# Patient Record
Sex: FEMALE | Race: BLACK OR AFRICAN AMERICAN | NOT HISPANIC OR LATINO | Employment: FULL TIME | URBAN - METROPOLITAN AREA
[De-identification: names, ages, dates, MRNs, and addresses within clinical notes are randomized per-mention and may not be internally consistent; named-entity substitution may affect disease eponyms.]

---

## 2017-12-07 LAB — POC BETA-HCG (QUAL): POSITIVE

## 2018-01-03 ENCOUNTER — HISTORICAL (OUTPATIENT)
Dept: ADMINISTRATIVE | Facility: HOSPITAL | Age: 28
End: 2018-01-03

## 2018-01-03 LAB
ABS NEUT (OLG): 6.48 X10(3)/MCL (ref 2.1–9.2)
BASOPHILS # BLD AUTO: 0.02 X10(3)/MCL
BASOPHILS NFR BLD AUTO: 0 % (ref 0–1)
BILIRUB SERPL-MCNC: NEGATIVE MG/DL
BLOOD URINE, POC: NEGATIVE
BUN SERPL-MCNC: 10 MG/DL (ref 7–18)
CALCIUM SERPL-MCNC: 8.9 MG/DL (ref 8.5–10.1)
CHLORIDE SERPL-SCNC: 104 MMOL/L (ref 98–107)
CLARITY, POC UA: CLEAR
CO2 SERPL-SCNC: 28 MMOL/L (ref 21–32)
COLOR, POC UA: YELLOW
CREAT SERPL-MCNC: 0.8 MG/DL (ref 0.6–1.3)
EOSINOPHIL # BLD AUTO: 0.01 10*3/UL
EOSINOPHIL NFR BLD AUTO: 0 % (ref 0–5)
ERYTHROCYTE [DISTWIDTH] IN BLOOD BY AUTOMATED COUNT: 11.2 % (ref 11.5–14.5)
GLUCOSE SERPL-MCNC: 80 MG/DL (ref 74–106)
GLUCOSE UR QL STRIP: NEGATIVE
HBV SURFACE AG SERPL QL IA: NEGATIVE
HCT VFR BLD AUTO: 41.5 % (ref 35–46)
HCV AB SERPL QL IA: NONREACTIVE
HGB BLD-MCNC: 13.5 GM/DL (ref 12–16)
HIV 1+2 AB+HIV1 P24 AG SERPL QL IA: NONREACTIVE
IMM GRANULOCYTES # BLD AUTO: 0.02 10*3/UL
IMM GRANULOCYTES NFR BLD AUTO: 0 %
KETONES UR QL STRIP: NORMAL
LEUKOCYTE EST, POC UA: NEGATIVE
LYMPHOCYTES # BLD AUTO: 1.63 X10(3)/MCL
LYMPHOCYTES NFR BLD AUTO: 19 % (ref 15–40)
MCH RBC QN AUTO: 29.5 PG (ref 26–34)
MCHC RBC AUTO-ENTMCNC: 32.5 GM/DL (ref 31–37)
MCV RBC AUTO: 90.6 FL (ref 80–100)
MONOCYTES # BLD AUTO: 0.39 X10(3)/MCL
MONOCYTES NFR BLD AUTO: 5 % (ref 4–12)
NEUTROPHILS # BLD AUTO: 6.48 X10(3)/MCL
NEUTROPHILS NFR BLD AUTO: 76 X10(3)/MCL
NITRITE, POC UA: NEGATIVE
PAP RECOMMENDATION EXT: NORMAL
PAP SMEAR: NORMAL
PH, POC UA: 6.5
PLATELET # BLD AUTO: 166 X10(3)/MCL (ref 130–400)
PMV BLD AUTO: 11.4 FL (ref 7.4–10.4)
POTASSIUM SERPL-SCNC: 3.8 MMOL/L (ref 3.5–5.1)
PROTEIN, POC: NORMAL
RBC # BLD AUTO: 4.58 X10(6)/MCL (ref 4–5.2)
RPR SER QL: REACTIVE
SODIUM SERPL-SCNC: 139 MMOL/L (ref 136–145)
SPECIFIC GRAVITY, POC UA: 1.02
T PALLIDUM AB SER QL: REACTIVE
UROBILINOGEN, POC UA: NORMAL
WBC # SPEC AUTO: 8.6 X10(3)/MCL (ref 4.5–11)

## 2018-01-05 LAB — FINAL CULTURE: NORMAL

## 2018-01-08 LAB
BILIRUB SERPL-MCNC: NEGATIVE MG/DL
BLOOD URINE, POC: NEGATIVE
CLARITY, POC UA: NORMAL
COLOR, POC UA: YELLOW
GLUCOSE UR QL STRIP: NEGATIVE
KETONES UR QL STRIP: NEGATIVE
LEUKOCYTE EST, POC UA: NEGATIVE
NITRITE, POC UA: NEGATIVE
PH, POC UA: 7
PROTEIN, POC: NORMAL
SPECIFIC GRAVITY, POC UA: 1.01
UROBILINOGEN, POC UA: NORMAL

## 2018-01-15 LAB
BILIRUB SERPL-MCNC: NEGATIVE MG/DL
BLOOD URINE, POC: NEGATIVE
CLARITY, POC UA: CLEAR
COLOR, POC UA: YELLOW
GLUCOSE UR QL STRIP: NEGATIVE
KETONES UR QL STRIP: NEGATIVE
LEUKOCYTE EST, POC UA: NEGATIVE
NITRITE, POC UA: NEGATIVE
PH, POC UA: 7
PROTEIN, POC: NORMAL
SPECIFIC GRAVITY, POC UA: 1.01
UROBILINOGEN, POC UA: NORMAL

## 2018-01-29 ENCOUNTER — HISTORICAL (OUTPATIENT)
Dept: FAMILY MEDICINE | Facility: CLINIC | Age: 28
End: 2018-01-29

## 2018-02-01 ENCOUNTER — HISTORICAL (OUTPATIENT)
Dept: RADIOLOGY | Facility: HOSPITAL | Age: 28
End: 2018-02-01

## 2018-03-19 ENCOUNTER — HISTORICAL (OUTPATIENT)
Dept: ADMINISTRATIVE | Facility: HOSPITAL | Age: 28
End: 2018-03-19

## 2018-03-21 LAB — FINAL CULTURE: NORMAL

## 2021-10-01 ENCOUNTER — HISTORICAL (OUTPATIENT)
Dept: ADMINISTRATIVE | Facility: HOSPITAL | Age: 31
End: 2021-10-01

## 2021-10-01 LAB
IRON SATN MFR SERPL: 27 % (ref 20–50)
IRON SERPL-MCNC: 77 UG/DL (ref 50–170)
TIBC SERPL-MCNC: 213 UG/DL (ref 70–310)
TIBC SERPL-MCNC: 290 UG/DL (ref 250–450)
TRANSFERRIN SERPL-MCNC: 260 MG/DL (ref 180–382)
TSH SERPL-ACNC: 1.6 UIU/ML (ref 0.35–4.94)

## 2021-10-12 ENCOUNTER — HISTORICAL (OUTPATIENT)
Dept: ADMINISTRATIVE | Facility: HOSPITAL | Age: 31
End: 2021-10-12

## 2021-10-12 LAB
BUN SERPL-MCNC: 13.4 MG/DL (ref 7–18.7)
CALCIUM SERPL-MCNC: 9.9 MG/DL (ref 8.4–10.2)
CHLORIDE SERPL-SCNC: 107 MMOL/L (ref 98–107)
CO2 SERPL-SCNC: 26 MMOL/L (ref 22–29)
CREAT SERPL-MCNC: 0.77 MG/DL (ref 0.55–1.02)
CREAT/UREA NIT SERPL: 17
GLUCOSE SERPL-MCNC: 82 MG/DL (ref 74–100)
POTASSIUM SERPL-SCNC: 4 MMOL/L (ref 3.5–5.1)
RET# (OHS): 0.04 X10(6)/MCL (ref 0.02–0.08)
RETICULOCYTE COUNT AUTOMATED (OLG): 1 % (ref 0.5–1.5)
SODIUM SERPL-SCNC: 140 MMOL/L (ref 136–145)

## 2021-11-05 ENCOUNTER — HISTORICAL (OUTPATIENT)
Dept: RADIOLOGY | Facility: HOSPITAL | Age: 31
End: 2021-11-05

## 2022-04-09 ENCOUNTER — HISTORICAL (OUTPATIENT)
Dept: ADMINISTRATIVE | Facility: HOSPITAL | Age: 32
End: 2022-04-09
Payer: MEDICAID

## 2022-04-25 VITALS
WEIGHT: 190.69 LBS | HEIGHT: 66 IN | OXYGEN SATURATION: 100 % | SYSTOLIC BLOOD PRESSURE: 191 MMHG | BODY MASS INDEX: 30.65 KG/M2 | DIASTOLIC BLOOD PRESSURE: 136 MMHG

## 2022-04-30 NOTE — PROGRESS NOTES
Patient:   Savanah Pang             MRN: [id number]            FIN: [id number]               Age:   27 years     Sex:  Female     :  1990   Associated Diagnoses:   None   Author:   Kory WINSTON, Brenna Live      I have reviewed the positive STI test result 1/3/18 for Syph Ab + and RPR + with 2 dils  I have asked our nursing staff to call the [facility] dept to ask for any available treatment records.    I have asked our nursing staff to change OB followup to next appointment within one week (in stead of 4 weeks) to  discuss results with patient and make treatment plan.    18 Peyton WINSTON

## 2022-04-30 NOTE — PROGRESS NOTES
Patient:   Savanah Pang             MRN: [id number]            FIN: [id number]               Age:   27 years     Sex:  Female     :  1990   Associated Diagnoses:   IUP (intrauterine pregnancy), incidental   Author:   Brenna Horn MD      Chief Complaint   Pregnant      History of Present Illness     28 yo  @ 8wks1d GA dated by 8wd U/S only (EDC = 18)  New Issues:  none    Chronic Issues: previous       Histories   Gestational History:   - G1: 10/2009 term 40 wk 6.5lb female by  , epidural, [facility], bottle fed,  COMPLICATIONS: cHTN (did not need meds)   - G2: 10/2010 term 30 week FORCEPS 7lb male assistend vag delivery (subjectively for failure to descend), epidural [facility], bottle fed,  COMPLICATIONS: cHTN (did not need meds)   - G3: 3/2014 term 38 week 6.5 lb female , NO epidural, [facility], bottle fed,  COMPLICATIONS: cHTN (did not need meds)   - G4: CURRENT  Gyn History:    - LMP: 10/26/2018   - Age at menarche: 13 years   - Menstrual hx: regula, 30day cycles,   - History of birth control:     - History of STDs : denies cutaneous genital lesions and herpes, EVER   - DENIES Abnormal PAPs or previous COLPO    Past Medical History: Rh NEG  Surgical History: denies  Family History: non contributory  Social History:  denies: EtOH/tob/drugs  Medications: none since LMP      Past Medical History:    Resolved  Pregnant ([id number]): Onset on 2013 at 23 years.  Resolved on 3/30/2014 at 23 years.  Pregnant ([id number]): Onset on 2010 at 19 years.  Resolved on 10/20/2010 at 20 years.  Pregnant ([id number]): Onset on 2009 at 18 years.  Resolved on 10/28/2009 at 19 years.  No Chronic Problems (NKP):  Resolved.   Family History:    No family history items have been selected or recorded.   Procedure history:    denies.   Social History        Social & Psychosocial Habits    Alcohol  2014 Risk Assessment: Denies Alcohol Use    2017  Use: Current     Type: Beer, Liquor, Wine    Frequency: Daily    Employment/School  01/03/2018  Status: Employed    Substance Abuse  03/30/2014 Risk Assessment: Denies Substance Abuse    09/20/2017  Use: Never    Tobacco  03/30/2014 Risk Assessment: Medium Risk    09/20/2017  Use: Current every day smoker    Type: Cigarettes    Tobacco use per day: 1  .        Health Status   Allergies:    Allergic Reactions (All)  No Known Medication Allergies,    Allergies (1) Active Reaction  No Known Medication Allergies None Documented     Current medications:  (Selected)   Outpatient Medications  Ordered  RhoGAM: 150 mg, form: Injection, IM, Once, first dose 11/12/13 14:44:00 CST, stop date 11/12/13 14:44:00 CST, STAT  RhoGAM: 300 mcg, form: Injection, IM, Once, first dose 11/12/13 14:32:00 CST, stop date 11/12/13 14:32:00 CST, STAT  influenza virus vaccine, inactivated preserve-free pediatric quadrivalent intramuscular suspension...: 0.5 mL, form: Susp, IM, Once, first dose 01/03/18 10:00:00 CST, stop date 01/03/18 10:00:00 CST  Prescriptions  Prescribed  PNV Tabs 29-1 oral tablet: 1 tab(s), Oral, Daily, PHARMACY MAY DISPENSE ANY SIMILAR ALTERNATIVE PRENATAL VITAMIN AS AVAILABLE/COVERED, # 90 tab(s), 2 Refill(s), Pharmacy: [id number],    Home Medications (1) Active  PNV Tabs 29-1 oral tablet 1 tab(s), Oral, Daily     Problem list:    All Problems  Active labor / ICD-9- / Confirmed  Caffeine user / SNOMED CT 4943001797 / Confirmed  Problem added by Discern Expert  Chronic hypertension in obstetric context / SNOMED CT 50375256 / Confirmed  Problem added by Discern Expert  Encounter to determine fetal viability of pregnancy / SNOMED CT 96233890 / Confirmed  Pregnant / SNOMED CT 508799176 / Probable  Tobacco user / SNOMED CT 095285278 / Probable  Resolved: At risk for infection / SNOMED CT 556608982  Problem automatically updated based on documentation on WBC and/or Neutro Auto.  Resolved: At risk of pressure sore / SNOMED CT  132573179  Resolved: Impaired mobility / SNOMED CT 611471385  Problem automatically updated based on documentation on Assistive Device, ADLs, Activity Status ADLs, Musculoskeletal Range of Motion, Musculoskeletal Activity, Special Orthopedic Devices, and/or Traction Type.  Resolved: No Chronic Problems / Cerner NKP  Resolved: Pregnant / SNOMED CT 950809913  Resolved: Pregnant / SNOMED CT 013506267  Resolved: Pregnant / SNOMED CT 853751037,    Active Problems (6)  Active labor   Caffeine user   Chronic hypertension in obstetric context   Encounter to determine fetal viability of pregnancy   Pregnant   Tobacco user         Review of Systems     Antepartum specific     denies: dysuira/ abnormal discharge /vag bleeding/ pelvic or abd pain /   reports: nause and vomiting , mild HA intermittently      Constitutional:  No fever, No chills.    Ear/Nose/Mouth/Throat:  Negative except as documented in history of present illness.    Respiratory:  No shortness of breath, No cough, No wheezing.    Cardiovascular:  No chest pain, No palpitations.    Gastrointestinal:  No nausea, No vomiting.    Genitourinary:  No dysuria, No lesions.    Gynecologic:  Negative except as documented in history of present illness.    Musculoskeletal:  No back pain, No joint pain, No muscle pain.    Integumentary:  No rash, No skin lesion.    Psychiatric:  No anxiety, No depression, Not suicidal.       Physical Examination   Vital Signs   1/3/2018 8:51 CST        Temperature Oral          36.9 DegC                             Temperature Oral (calculated)             98.42 DegF                             Peripheral Pulse Rate     68 bpm                             Respiratory Rate          18 br/min                             SpO2                      100 %                             Systolic Blood Pressure   159 mmHg  HI                             Diastolic Blood Pressure  100 mmHg  HI     Measurements from flowsheet : Measurements   1/3/2018  8:51 CST        Weight Dosing             75.4 kg                             Weight Measured           75.4 kg                             Weight Measured and Calculated in Lbs     166.23 lb                             Height/Length Dosing      167 cm                             Height/Length Measured    167 cm                             BSA Measured              1.87 m2                             Body Mass Index Measured  27.04 kg/m2     General:  Alert and oriented, No acute distress.    Eye:  Extraocular movements are intact, Normal conjunctiva.    HENT:  Normocephalic, Oral mucosa is moist.    Neck:  Supple, No thyromegaly.    Respiratory:  Lungs are clear to auscultation, Respirations are non-labored, Breath sounds are equal.    Cardiovascular:  Normal rate, Regular rhythm, Good pulses equal in all extremities, Normal peripheral perfusion, No edema.    Gastrointestinal:  Soft, Non-tender, Normal bowel sounds, gravid, gravid.    Genitourinary:          Labia: no lesions.         Cervix: Mucosa ( No inflammation ), no blood per os, No lesions.    Obstetric Exam     Uterus: fundal height, consistent with gestational age.     Lainez/ Baby A fetal evaluation: fetal movement, heart tones, assessment of fetal heart tracing.     Perineum.     Vulva.     Vagina: discharge (small amount, white), no lesions.     Cervix.     No contractions noted.     Musculoskeletal:  Normal strength, Normal gait.    Integumentary:  Warm, Dry, No rash.    Neurologic:  Alert, Oriented, No focal deficits, Normal deep tendon reflexes.    Cognition and Speech:  Oriented, Speech clear and coherent.    Psychiatric:  Cooperative, Appropriate mood & affect, Normal judgment, Non-suicidal.       Health Maintenance      Health Maintenance     Pending (in the next year)        Due            Alcohol Misuse Screening due  01/03/18  and every 1  year(s)           Cervical Cancer Screening due  01/03/18  Variable frequency           Depression  Screening due  01/03/18  and every 1  year(s)           Hypertension Maintenance-Medication Prescribed due  01/03/18  and every 1  year(s)           Hypertension Management-Education due  01/03/18  and every 1  year(s)           Smoking Cessation due  01/03/18  and every 1  year(s)        Due In Future            Hypertension Management-Blood Pressure not due until  07/03/18  and every 6  month(s)           Tobacco Use Screening not due until  09/20/18  and every 1  year(s)           Influenza Vaccine not due until  10/02/18  and every 1  year(s)     Satisfied (in the past 1 year)        Satisfied            Blood Pressure Screening on  01/03/18.  Satisfied by Tracy Alvarado RN.           Body Mass Index Check on  01/03/18.  Satisfied by Tracy Alvarado RN.           Hypertension Management-Blood Pressure on  01/03/18.  Satisfied by Tracy Alvarado RN.           Influenza Vaccine on  01/03/18.  Satisfied by Brenna Horn MD           Obesity Screening on  01/03/18.  Satisfied by Tracy Alvarado RN.           Tobacco Use Screening on  09/20/17.  Satisfied by Iglesia Simmons          Review / Management   Results review:  Lab results   1/3/2018 9:09 CST        Urine Color Urine Dipstick                Yellow                             Urine Appearance Urine Dipstick           Clear                             pH Urine Dipstick         6.5                             Specific Gravity Urine Dipstick           1.020                             Blood Urine Dipstick      Negative                             Glucose Urine Dipstick    Negative                             Ketones Urine Dipstick    Trace                             Protein Urine Dipstick    Trace                             Bilirubin Urine Dipstick  Negative                             Urobilinogen Urine Dipstick               0.2 mg/dl                             Leukocytes Urine Dipstick Negative                             Nitrite Urine  Dipstick    Negative  .    Laboratory Results     Initial OB Labs:   - Blood Type and Rh: _   - Antibody Screen: _   - CBC H/H: _   - HIV: _   - RPR: _   - GC: _   - CT: _   - HBsAg: _   - HCVAb: _   - Rubella: _   - Varicella: _   - UA & Culture: _   - Sickle Cell Screen: _   - PAP: _   - Influenza vaccine date: _    15-20 Weeks Lab    - Quad Screen: _    28 Week Lab    - 1H GTT: _    - Rhogam: _    - Date of Tdap: _    - CBC H/H: _    - RPR: _    36 Week Lab    - CBC H/H: _    - RPR: _    - GBS Culture: _    - HIV: _    - Urine: GC: _      Radiology results      Ultrasound(s):        - Initial U/S:        - 20wk Anatomy scan:      Documentation reviewed:          -POC Urine Dip: reviewed at time of interview .       Impression and Plan   Diagnosis     IUP (intrauterine pregnancy), incidental (POT31-PC Z33.1).       28 yo  @ 8wks1d GA dated by 8wd U/S only (EDC = 18)       1. Intrauterine Pregnancy    - OB Protocol     - PNVs perscribed 1/3/18    - 1/3/18 Urine dip: neg nitr/ neg LE    - 1/3/18 fri tri (initial) labs: ordered/pending    -  plans on breast  feeds    - Postpartum contraception discussion:  considering MirenaLARC    - flu vacc ORDERED 1/3/18    2)essential HTN complicating pregnancy  - subjective hx of cHTN (before 20 weeks in G1-3 prior pregnancies)   - subjectively denies and history of Pre-E  - NEEDs ASA 81 mg daily initiated between 12-28 weeks  -single first non severely hypertensive in clinic (1/3/18) 159/100  - plan to start antiHTN meds if severe range 160/105  - Needs  24 hour baseline total urine protein,  if 1/3/18 UCx   negative     3) Rh NEG status  - STRICT ED precautions for vaginal bleeding  - counselled at length the need to present promptly for medical evaluation of vaginal bleeding  - NEEDs 28 week Rhogam    educated this visit: safe meds in pregnancy, PNVit compliance, complications of first trimester  strict ED precautions given for :ectopic/ SAB / VB/ pyelo /   dehydration / unable to tolerate po/ pelvic or abdominal pain    DISPO: anticipate   RTC in 4wks : [facility] for cHTN  -f/u 1/3/18 LABS ordered/PENDING  - NEEDS: EKG, BP log and cuff; 24 hour baseline total urine protein if negative 1/3/18 UCx  - start ASA 81 mg daily

## 2022-04-30 NOTE — PROGRESS NOTES
Patient:   Savanah Pang             MRN: [id number]            FIN: [id number]               Age:   27 years     Sex:  Female     :  1990   Associated Diagnoses:   None   Author:   Kwasi Cordova MD      Physician: MAGALY  Reason for Exam:INITIAL PRENATAL VISIT; DATING ULTRASOUND  :4  Parity:3  LMP:10/26/2017  Gestational Age: 9w6d  EDC:     Examination:  CODEY: ADEQUATE  Fetal Tone: PRESENT  Fetal Movement:   Fetal Heart Rate: 178  Fetus: SINGLE  Presentation:   Placenta:       Position: WRAP      Grade:     Measurement:   CRL: 1.73cm  EGA: 8w1d  EDC: 2018    Comments: 1st trimester intrauterine pregnancy measuring 8w1d for EDC of 2018 which differs from menstrual date of 2018. FHR: 178. Viable pregnancy.  ASSIGN EDC: 2018.          This document has images

## 2022-04-30 NOTE — PROGRESS NOTES
Patient:   Savanah Pang             MRN: [id number]            FIN: [id number]               Age:   27 years     Sex:  Female     :  1990   Associated Diagnoses:   IUP (intrauterine pregnancy), incidental   Author:   Yomaira Moser MD      History of Present Illness   28 yo Rh NEG  @ 18^6 wks6d GA dated by 8wd U/S only (EDC = 18) presents to [facility]    New Issues:  none; denies any vag bleeding or discharge, headache, change, edema, or gush of fluid.    Chronic Issues:  Previous history of syphilis: Treated at [facility] in ,  and 2018  Hypertension: Currently on labetalol 100 twice a day and 81 mg ASA, initiated on HCTZ 25 mg daily on 3/19/2018  Previous history of preeclampsia in the past 3 pregnancies  Rh negative: Patient needs RhoGAM at 28 weeks         Health Status   Allergies:    Allergies (1) Active Reaction  No Known Medication Allergies None Documented     Current medications:    Home Medications (4) Active  aspirin 81 mg oral Delayed Release (EC) tablet 81 mg = 1 tab(s), Oral, Daily  hydrochlorothiazide 25 mg oral tablet 25 mg = 1 tab(s), Oral, Daily  labetalol 100 mg oral tablet 100 mg = 1 tab(s), Oral, BID  PNV Tabs 29-1 oral tablet 1 tab(s), Oral, Daily        Physical Examination   Vital Signs   3/19/2018 13:18 CDT      Peripheral Pulse Rate     66 bpm                             Systolic Blood Pressure   150 mmHg  HI                             Diastolic Blood Pressure  108 mmHg  HI                             Mean Arterial Pressure, Cuff              122 mmHg    3/19/2018 13:15 CDT      Temperature Oral          36.8 DegC                             Temperature Oral (calculated)             98.24 DegF                             Peripheral Pulse Rate     67 bpm                             Respiratory Rate          20 br/min                             SpO2                      96 %                             Saturation Probe Site     Hand, left                              Systolic Blood Pressure   140 mmHg                             Diastolic Blood Pressure  92 mmHg  HI                             Mean Arterial Pressure, Cuff              108 mmHg                             Blood Pressure Location   Right arm                             Blood Pressure Cuff Size  Adult large     Measurements from flowsheet : Measurements   3/19/2018 13:15 CDT      Weight Measured           79.4 kg                             Weighing Method           Standing                             Height/Length Dosing      167 cm                             Height/Length Measured    167 cm                             BSA Measured              1.92 m2                             Body Mass Index Measured  28.47 kg/m2                             Ideal Body Weight Calculated              58.074911 kg     General:  Alert and oriented, No acute distress.    Eye:  Extraocular movements are intact, Normal conjunctiva.    HENT:  Normocephalic, Oral mucosa is moist.    Neck:  Supple, No thyromegaly.    Respiratory:  Lungs are clear to auscultation, Respirations are non-labored, Breath sounds are equal.    Cardiovascular:  Normal rate, Regular rhythm, Good pulses equal in all extremities, Normal peripheral perfusion, No edema.    Gastrointestinal:  Soft, Non-tender, Normal bowel sounds, gravid, gravid.    Obstetric Exam     Uterus: consistent with gestational age, FH <19.     Lainez/ Baby A fetal evaluation: fetal movement, heart tones 144 bpm.     Musculoskeletal:  Normal strength, Normal gait.    Integumentary:  Warm, Dry, No rash.    Neurologic:  Alert, Oriented, No focal deficits, Normal deep tendon reflexes.    Cognition and Speech:  Oriented, Speech clear and coherent.    Psychiatric:  Cooperative, Appropriate mood & affect, Normal judgment, Non-suicidal.       Review / Management   Results review   Laboratory Results     Initial OB Labs(1/3/18) :   - Blood Type and Rh:  O Rh neg   - Antibody  Screen: neg   - CBC H/H: _   - HIV: neg   -Syph AB  positive RPR: positive 2 Dils    - GC: neg   - CT: neg   - HBsAg: neg   - HCVAb: neg   - Rubella: Imm   - Varicella: Imm   - UCulture:  NG x 48hr   - Sickle Cell Screen: neg   - 1/3/18 PAP: satis PAP: NILM     - Influenza vaccine date: 1/3/18    15-20 Weeks Lab    - Quad Screen: Negative on 3/12/18    28 Week Lab    - 1H GTT: _    - Rhogam: _    - Date of Tdap: _    - CBC H/H: _    - RPR: _    36 Week Lab    - CBC H/H: _    - RPR: _    - GBS Culture: _    - HIV: _    - Urine: GC: _      Radiology results   Comments: 1st trimester intrauterine pregnancy measuring 8w1d for EDC of 08/14/2018 which differs from menstrual date of 08/02/2018. FHR: 178. Viable pregnancy.  ASSIGN EDC: 08/14/2018.     Documentation reviewed:          -POC Urine Dip: reviewed at time of interview .       Impression and Plan   Diagnosis     IUP (intrauterine pregnancy), incidental (GLS24-LX Z33.1).       1. Intrauterine Pregnancy:     - OB Protocol     - PNVs perscribed 1/3/18    -  plans on breast  feeds    - Postpartum contraception discussion:  considering Mirena as LARC    - Influenza vaccine date: 1/3/18    2) essential HTN complicating pregnancy  - diagnosed by reproducibly elevated  BPS @ rest <20wk; including severe BPs   -single first non severely hypertensive in clinic (1/3/18) 159/100   - second non severly elevated   clinic HTN (1/15/18) /104   -BP on 3/19/2018: Manual measurement of 140/93  - OFF  aldomet  (prev 11-16wk)   - CHANGED to labetolol 100 mg BID (2/5/17)  -Added HCTZ 25 mg on 3/19/2018  - subjective hx of cHTN (before 20 weeks in G1-3 prior pregnancies)   - subjectively denies and history of Pre-E in prev pregnancies  -ASA 81 mg daily  perscribed 1/15/18 ;    -pt is aware of recommendation to start daily 81 mg ASA with excellent compliance @ 12 weeks (after Jan 31 2018)  - 1/15/18 EKG: sinus rhythm, rate 72, intervals normal, No specific ST changes; likely LVH  "  -reveiwed 18 ECHO  - 1/15/18 24 hour baseline total urine protein= 117mg (WNL)  - NEEDS  NSTs @ 32 weeks then twice a week @ 36 weeks  - POOR compliance with home BP log    3) Rh NEG status  -seen in ED 3/1/18 for "vaginal bleeding"; though complaint hx per pt more consistent with hematuria in setting of lab confirmed UTI   - NO rhogam given in 3/1/18 ED visit  - STRICT ED precautions for vaginal bleeding   - counselled at length the need to present promptly for medical evaluation of vaginal bleeding  - NEEDs 28 week Rhogam  - consider RBC Ab screening with 24-28 wks    4) Syphilis Ab testing abnormal  - 1/3/18 Syphilis Antibody POS, 2 dils without history of prior treatment  - treated with 2.4MU PCN x 3   - s/p 18 2.4MU PCN Administered   - s/p 1/15/18 2.4MU PCN Administered   - s/p 18 2.4MU PCN Administered    strict ED precautions given for signs and symptoms of: dehydration, vag bleeding, SAB,  Labor, pyelonephritis, LOF     DISPO:   RTC in 3wks for fetal anatomy  - BP check in the next visit.  Check for symptoms of preeclampsia at next visit  -Reassess medication compliance at next visit              "

## 2022-05-02 NOTE — HISTORICAL [facility] C[facility]ER
This is a historical note converted from Mani. Formatting and pictures may have been removed.  Please reference Mani for original formatting and attached multimedia. Chief Complaint  FOLLOW UP WITH HTN  History of Present Illness  31-year-old female presents to clinic for follow up.  ?   Patient initially seen 10/1/2021.  ?  ?   Severe Uncontrolled Hypertension  Patient reports history of?eclampsia with severe features? last pregnancy in 2018.  Was continued on blood pressure medication after the pregnancy?but stated that she eventually stopped taking the medication.  Had been feeling fine until approximately 1 month ago when she started to take her blood pressure again due to daily headaches and noticed that?her blood pressure was rising?upper 100s to 200s over?160s.  9/30/2021 patient?had some numbness in her arm which prompted an ER visit?patient noted to have blood pressure 236/131?patient was given amlodipine prescription in the ED which she did not .  At last visit patient was prescribed amlodipine 10?to start immediately and lisinopril 20 mg to start 3 days after amlodipine. patient has been complaint with taking BP meds and states headaches and other body aches have resolved.  Does have renal artery Doppler scheduled 10/18/2021  Does note swelling at times in her ankles  Denies shortness of breath, chest pain  ?   Anemia  Patient with H&H of 11.9/36.8?was previously also low  Denies bleeding?in urine and UA in?ED does not show red blood cells  Denies bleeding gums or bleeding?in her feces  Has an IUD in place and states that her periods are very light  Iron panel performed last visit and iron noted to be within normal limits  ?  ?  ?   HM  Flu vaccine - declines  Review of Systems  Constitutional: no fever, no chills, no weight loss  CV: , no chest pain, no palpitations  ENT: no cough, no nasal congestion  : no urinary retention, no urinary incontinence  GI: , no diarrhea, no nausea, no  vomiting  Resp: no SOB, no wheezing, no difficulty breathing  Skin: no rash, no wounds, no discolorations  Neuro: no numbness/tingling, no weakness,? no syncope  Psych: no depression, no anxiety  Physical Exam  Vitals & Measurements  T:?36.9? ?C (Oral)? HR:?71(Peripheral)? RR:?18? BP:?136/71? SpO2:?99%?  HT:?168.00?cm? WT:?85.500?kg? BMI:?30.29?  General-well appearing, NAD, wearing mask  Eye_- PERRL, EOMI, no scleral icterus  ENMT- deferred  Respiratory- CTAB, no rhonchi or stridor noted  Cardiovascular- RRR, no murmurs auscultated.  Genitourinary- deferred  Musculoskeletal-+AROM, slight edema to ankles  Integumentary- warm, dry ,intact  Neurologic- cooperative  Assessment/Plan  1.?Anemia?D64.9  ?Reticulocyte count and hemoglobin fractionated with reflex ordered  Denies personal or family history of sickle cell trait  Ordered:  Hgb Frac w/Reflx to Solubility-[facility] [id number], Now collect, 10/12/21 14:45:00 CDT, Blood, Order for future visit, Stop date 10/12/21 14:45:00 CDT, Lab Collect, Chronic anemia, 10/12/21 14:45:00 CDT  Reticulocyte Count, Routine collect, 10/12/21 14:45:00 CDT, Blood, Order for future visit, Stop date 10/12/21 14:45:00 CDT, Lab Collect, Chronic anemia, 10/12/21 14:45:00 CDT  ?  Hypertension?I10  ?Continue lisinopril 20 mg and amlodipine 10 mg  Tone blood pressure cuff with office blood pressure monitor?must subtract 20 from patients blood pressure cuff?off of just diastolic? only.  repeating BMP today to check resolution of hyperkalemia  ?  ?  Ordered:  Basic Metabolic Panel, Routine collect, *Est. 10/12/21 3:00:00 CDT, Blood, Order for future visit, *Est. Stop date 10/12/21 3:00:00 CDT, Lab Collect, Hypertension, 10/12/21 14:44:00 CDT  Clinic Follow up, *Est. 12/12/21 3:00:00 CST, Order for future visit, Hypertension, [facility]  Office/Outpatient Visit Level 4 Established [id number] PC, Hypertension, [facility], 10/12/21 14:44:00 CDT  ?  Orders:  amLODIPine, 10 mg = 1  tab(s), Oral, Daily, # 30 tab(s), 3 Refill(s), Pharmacy: [id number], 168, cm, Height/Length Dosing, 10/12/21 14:34:00 CDT, 85.5, kg, Weight Dosing, 10/12/21 14:34:00 CDT  lisinopril, 20 mg = 1 tab(s), Oral, Daily, # 30 tab(s), 3 Refill(s), Pharmacy: [id number], 168, cm, Height/Length Dosing, 10/12/21 14:34:00 CDT, 85.5, kg, Weight Dosing, 10/12/21 14:34:00 CDT  RTC 2 months  Referrals  Clinic Follow up, *Est. 12/12/21 3:00:00 CST, Order for future visit, Hypertension, [facility]   Medications  amlodipine 10 mg oral tablet, 10 mg= 1 tab(s), Oral, Daily, 3 refills  lisinopril 20 mg oral tablet, 20 mg= 1 tab(s), Oral, Daily, 3 refills  RhoGAM, 300 mcg= 1 mL, IM, Once  RhoGAM, 150 mg= 500 mL, IM, Once  Allergies  No Known Medication Allergies  Family History  Family history is negative  Immunizations  Vaccine Date Status   tetanus/diphtheria/pertussis, acel(Tdap) 05/21/2018 Given   influenza virus vaccine, inactivated 01/03/2018 Given   tetanus-diphtheria toxoids 08/31/2015 Given   tetanus/diphtheria/pertussis, acel(Tdap) 03/31/2014 Given       Addendum correct typo of hyperkalemia to hypokalemia.  ?  Lamar Thorpe MD?  ?

## 2022-05-02 NOTE — HISTORICAL [facility] CERNER
This is a historical note converted from Mani. Formatting and pictures may have been removed.  Please reference Mani for original formatting and attached multimedia. Chief Complaint  establish care  History of Present Illness  31-year-old female presents to clinic to establish PCP.  ?  ?  Severe Uncontrolled Hypertension  Patient reports history of?eclampsia with severe features? last pregnancy in 2018.  Was continued on blood pressure medication after the pregnancy?but stated that she eventually stopped taking the medication.  Had been feeling fine until approximately 1 month ago when she started to take her blood pressure again due to daily headaches and noticed that?her blood pressure was rising?upper 100s to 200s over?160s.  9/30/2021 patient?had some numbness in her arm which prompted an ER visit?patient noted to have blood pressure 236/131?patient was given amlodipine prescription in the ED which she did not .  Of note?her potassium was noted to be 3.1?and she was given potassium supplementation before leaving the hospital.  Patient reports that she will ?her?amlodipine?10 mg today. ?She does have blood pressure monitor at home?and is amenable to checking her blood pressure daily  She denies history of Conn syndrome?or eating a lot of licorice  Does note swelling at times in her ankles  Denies shortness of breath, chest pain  ?  Anemia  Patient with H&H of 11.9/36.8?was previously also low  Denies bleeding?in urine and UA in?ED yesterday does not show red blood cells  Denies bleeding gums or bleeding?in her feces  Has an IUD in place and states that her periods are very light  ?  ?  Medical history-as above  Surgical history-denies  Family history-mother and grandmother with hypertension  Social history?former smoker-, drinks seldom on the weekend, no illicit drug use.  ?  ?  HM  Flu vaccine - declines  Review of Systems  Constitutional: no fever, no chills, no weight loss  CV: , no chest  pain, no palpitations  ENT: no cough, no nasal congestion  : No dysuria  GI: , no diarrhea, no nausea, no vomiting  Resp: no SOB, no wheezing, no difficulty breathing  Skin: no rash, no wounds, no discolorations  Neuro: , no syncope  MSK:, no limb deformities, no gait disturbances, no neck pain  Psych: no depression, no anxiety  Physical Exam  Vitals & Measurements  T:?36.8? ?C (Oral)? HR:?71(Peripheral)? RR:?18? BP:?168/128? SpO2:?98%?  HT:?168.00?cm? WT:?85.300?kg? BMI:?30.22?  General-well appearing, NAD, wearing mask  Eye_- PERRL, EOMI, no scleral icterus  ENMT- deferred  Respiratory- CTAB, no rhonchi or stridor noted  Cardiovascular- RRR, no murmurs auscultated,?nonpitting edema?to ankles  Gastrointestinal- abdomen soft, Non tender, +BS,?no organomegaly  Genitourinary- deferred  Musculoskeletal-+AROM  Integumentary- warm, dry ,intact  Neurologic- cooperative  Assessment/Plan  1.?Severe uncontrolled hypertension?I10  ?Discussion with patient regarding?necessity of taking blood pressure medicines  DASH diet given  Clonidine 0.1 given in clinic secondary to?severe high blood pressure  Prescribed amlodipine 10?mg to start for the next 3 days patient instructed to take blood pressure daily with home monitor  Will start lisinopril 20 mg?3 days post amlodipine  ?  ?  Etiology of severe uncontrolled hypertension is unknown but includes Conn syndrome, renal artery stenosis  Ordering?aldosterone renin ratio, TSH  Ultrasound renal Doppler ordered;?will consider CT abdomen pelvis.  Ordered:  Aldosterone Renin Ratio-[facility] [id number], Routine collect, 10/01/21 9:18:00 CDT, Blood, Stop date 10/01/21 9:18:00 CDT, Lab Collect, Venous Draw, Hypertension  Low blood potassium, 10/01/21 8:13:00 CDT  Clinic Follow up, *Est. 10/08/21 3:00:00 CDT, hypertension follow up, Order for future visit, Hypertension  Low blood potassium  Anemia, [facility]  Office/Outpatient Visit Level 4 New [id number] PC, Hypertension  Low blood  potassium  Anemia, [facility], 10/01/21 8:44:00 CDT  Thyroid Stimulating Hormone, Routine collect, 10/01/21 9:18:00 CDT, Blood, Stop date 10/01/21 9:18:00 CDT, Lab Collect, Venous Draw, Hypertension  Low blood potassium, 10/01/21 8:42:00 CDT  US Renal Arteries Doppler, Routine, *Est. 10/01/21 3:00:00 CDT, Hypertension, None, Ambulatory, Patient Has IV?, Rad Type, Order for future visit, Hypertension  Low blood potassium, Schedule this test, [facility], *Est. 10/01/21 3:00:00 CDT  ?  2.?Low blood potassium?E87.6,?Low blood potassium?E87.6,?Low blood potassium?E87.6  ?Was repleted?in ED will check again at next weeks visit  Ordered:  Aldosterone Renin Ratio-[facility] [id number], Routine collect, 10/01/21 9:18:00 CDT, Blood, Stop date 10/01/21 9:18:00 CDT, Lab Collect, Venous Draw, Hypertension  Low blood potassium, 10/01/21 8:13:00 CDT  Clinic Follow up, *Est. 10/08/21 3:00:00 CDT, hypertension follow up, Order for future visit, Hypertension  Low blood potassium  Anemia, [facility]  Office/Outpatient Visit Level 4 New [id number] PC, Hypertension  Low blood potassium  Anemia, [facility], 10/01/21 8:44:00 CDT  Thyroid Stimulating Hormone, Routine collect, 10/01/21 9:18:00 CDT, Blood, Stop date 10/01/21 9:18:00 CDT, Lab Collect, Venous Draw, Hypertension  Low blood potassium, 10/01/21 8:42:00 CDT  US Renal Arteries Doppler, Routine, *Est. 10/01/21 3:00:00 CDT, Hypertension, None, Ambulatory, Patient Has IV?, Rad Type, Order for future visit, Hypertension  Low blood potassium, Schedule this test, [facility], *Est. 10/01/21 3:00:00 CDT  ?  3.?Anemia?D64.9,?Anemia?D64.9  ?Iron panel today  Ordered:  Clinic Follow up, *Est. 10/08/21 3:00:00 CDT, hypertension follow up, Order for future visit, Hypertension  Low blood potassium  Anemia, [facility]  Iron Binding Capacity Total - Iron Profile, Routine collect, 10/01/21 9:18:00 CDT,  Blood, Stop date 10/01/21 9:18:00 CDT, Lab Collect, Venous Draw, Anemia, 10/01/21 8:42:00 CDT  Office/Outpatient Visit Level 4 New [id number] PC, Hypertension  Low blood potassium  Anemia, [facility], 10/01/21 8:44:00 CDT  ?  Orders:  amLODIPine, 10 mg = 1 tab(s), Oral, Daily, # 30 tab(s), 3 Refill(s), Pharmacy: [id number], 168, cm, Height/Length Dosing, 10/01/21 8:25:00 CDT, 85.3, kg, Weight Dosing, 10/01/21 8:25:00 CDT  lisinopril, 20 mg = 1 tab(s), Oral, Daily, # 30 tab(s), 3 Refill(s), Pharmacy: [facility], 168, cm, Height/Length Dosing, 10/01/21 8:25:00 CDT, 85.3, kg, Weight Dosing, 10/01/21 8:25:00 CDT  RTC 1 week  Referrals  Clinic Follow up, *Est. 10/08/21 3:00:00 CDT, hypertension follow up, Order for future visit, Hypertension  Low blood potassium  Anemia, [facility]   Medications  amlodipine 10 mg oral tablet, 10 mg= 1 tab(s), Oral, Daily, 3 refills  lisinopril 20 mg oral tablet, 20 mg= 1 tab(s), Oral, Daily, 3 refills  Allergies  No Known Medication Allergies  Family History  Family history is negative      Patient given 0.1 mg clonidine in office with resultant decrease in blood pressure 163/112  Patient feeling?okay and?will go  blood pressure medication today.? Reminded to bring blood pressure cuff to next visit to calibrate with office equipment.  ?  Lamar Thorpe MD?  ?

## 2022-05-15 ENCOUNTER — HOSPITAL ENCOUNTER (EMERGENCY)
Facility: HOSPITAL | Age: 32
Discharge: HOME | End: 2022-05-15
Attending: STUDENT IN AN ORGANIZED HEALTH CARE EDUCATION/TRAINING PROGRAM
Payer: MEDICAID

## 2022-05-15 VITALS
WEIGHT: 182.13 LBS | SYSTOLIC BLOOD PRESSURE: 129 MMHG | HEART RATE: 80 BPM | OXYGEN SATURATION: 98 % | RESPIRATION RATE: 18 BRPM | BODY MASS INDEX: 29.27 KG/M2 | DIASTOLIC BLOOD PRESSURE: 93 MMHG

## 2022-05-15 DIAGNOSIS — T83.84XA PAIN DUE TO INTRAUTERINE CONTRACEPTIVE DEVICE (IUD), INITIAL ENCOUNTER: ICD-10-CM

## 2022-05-15 DIAGNOSIS — R10.2 PELVIC PAIN: Primary | ICD-10-CM

## 2022-05-15 LAB
APPEARANCE UR: CLEAR
B-HCG SERPL QL: NEGATIVE
B-HCG UR QL: NEGATIVE
BACTERIA #/AREA URNS AUTO: NORMAL /HPF
BILIRUB UR QL STRIP.AUTO: NEGATIVE MG/DL
COLOR UR AUTO: YELLOW
CTP QC/QA: YES
GLUCOSE UR QL STRIP.AUTO: NEGATIVE MG/DL
KETONES UR QL STRIP.AUTO: NEGATIVE MG/DL
LEUKOCYTE ESTERASE UR QL STRIP.AUTO: NEGATIVE UNIT/L
NITRITE UR QL STRIP.AUTO: NEGATIVE
PH UR STRIP.AUTO: 7 [PH]
PROT UR QL STRIP.AUTO: NEGATIVE MG/DL
RBC #/AREA URNS AUTO: <5 /HPF
RBC UR QL AUTO: NEGATIVE UNIT/L
SP GR UR STRIP.AUTO: 1.02 (ref 1–1.03)
SQUAMOUS #/AREA URNS AUTO: <4 /LPF
UROBILINOGEN UR STRIP-ACNC: 1 MG/DL
WBC #/AREA URNS AUTO: <5 /HPF

## 2022-05-15 PROCEDURE — 63600175 PHARM REV CODE 636 W HCPCS: Performed by: NURSE PRACTITIONER

## 2022-05-15 PROCEDURE — 81001 URINALYSIS AUTO W/SCOPE: CPT | Performed by: NURSE PRACTITIONER

## 2022-05-15 PROCEDURE — 81025 URINE PREGNANCY TEST: CPT | Performed by: NURSE PRACTITIONER

## 2022-05-15 PROCEDURE — 96372 THER/PROPH/DIAG INJ SC/IM: CPT | Performed by: NURSE PRACTITIONER

## 2022-05-15 PROCEDURE — 99285 EMERGENCY DEPT VISIT HI MDM: CPT | Mod: 25

## 2022-05-15 RX ORDER — KETOROLAC TROMETHAMINE 30 MG/ML
60 INJECTION, SOLUTION INTRAMUSCULAR; INTRAVENOUS
Status: COMPLETED | OUTPATIENT
Start: 2022-05-15 | End: 2022-05-15

## 2022-05-15 RX ORDER — KETOROLAC TROMETHAMINE 10 MG/1
10 TABLET, FILM COATED ORAL EVERY 6 HOURS
Qty: 20 TABLET | Refills: 0 | Status: SHIPPED | OUTPATIENT
Start: 2022-05-15 | End: 2022-05-20

## 2022-05-15 RX ADMIN — KETOROLAC TROMETHAMINE 60 MG: 30 INJECTION, SOLUTION INTRAMUSCULAR at 12:05

## 2022-05-15 NOTE — DISCHARGE INSTRUCTIONS
Take toradol for discomfort. If symptoms change such as vaginal discharge, fever, worsening pain then return to ER

## 2022-05-15 NOTE — ED PROVIDER NOTES
Encounter Date: 5/15/2022       History     Chief Complaint   Patient presents with    pelvic pain     Pt reports pelvic pain x2 days, +nausea, denies vomiting. LMP unknown, IUD. Denies urinary symptoms.      31 y/o female who presents with pelvic pain for 2 days (very minimal initially) but last night while having intercourse it worsened. No vaginal discharge, no dysuria, no fever. No n/v. Has IUD in place per dr. Cordova.       The history is provided by the patient. No  was used.   Abdominal Pain  The current episode started yesterday. The abdominal pain is located in the suprapubic region. The abdominal pain does not radiate. The severity of the abdominal pain is 3/10. The abdominal pain is relieved by certain positions. The other symptoms of the illness do not include fever, nausea, diarrhea, dysuria, vaginal discharge or vaginal bleeding.   The illness is associated with recent sexual activity.     Review of patient's allergies indicates:  No Known Allergies  No past medical history on file.  No past surgical history on file.  No family history on file.     Review of Systems   Constitutional: Negative for fever.   Gastrointestinal: Positive for abdominal pain. Negative for diarrhea and nausea.   Genitourinary: Negative for dysuria, vaginal bleeding and vaginal discharge.   All other systems reviewed and are negative.      Physical Exam     Initial Vitals [05/15/22 0901]   BP Pulse Resp Temp SpO2   (!) 154/90 92 18 -- 98 %      MAP       --         Physical Exam    Nursing note and vitals reviewed.  Constitutional: She appears well-developed and well-nourished.   Eyes: Conjunctivae are normal.   Cardiovascular: Regular rhythm.   Pulmonary/Chest: No respiratory distress.   Abdominal: Abdomen is soft. Bowel sounds are normal. There is abdominal tenderness (very low suprapbuic/pelvic).   Musculoskeletal:         General: Normal range of motion.     Neurological: She is alert and oriented to  person, place, and time.   Skin: Skin is warm and dry.   Psychiatric: She has a normal mood and affect.         ED Course   Procedures  Labs Reviewed   URINALYSIS, REFLEX TO URINE CULTURE - Normal   PREGNANCY TEST, URINE RAPID - Normal   URINALYSIS, MICROSCOPIC - Normal          Imaging Results          US Pelvis Complete Non OB (Final result)  Result time 05/15/22 12:17:26    Final result by Rashad Wills MD (05/15/22 12:17:26)                 Impression:      1. IUD lies along the lower uterine segment.  2. Small volume pelvic ascites, likely physiologic.      Electronically signed by: Rashad Wills  Date:    05/15/2022  Time:    12:17             Narrative:    EXAMINATION:  US PELVIS COMPLETE NON OB    CLINICAL HISTORY:  pelvic pain;    TECHNIQUE:  Transabdominal and endovaginal ultrasound of the pelvis.    COMPARISON:  Pelvic ultrasound 07/09/2018    FINDINGS:  Uterus has normal size.  IUD position along the lower uterine segment.  Normal endometrium.    Ovaries have normal appearances and documented blood flow.    Small volume pelvic free fluid.    Measurements:    Uterus: 7.8 x 4.6 x 4.9 cm    Endometrium: 3 mm thick    Right ovary: 5.0 x 3.0 x 2 1 cm    Left ovary: 3.1 x 1.8 x 2.5 cm                                 Medications   ketorolac injection 60 mg (60 mg Intramuscular Given 5/15/22 1203)     Medical Decision Making:   Clinical Tests:   Lab Tests: Ordered and Reviewed  Radiological Study: Ordered and Reviewed    Additional MDM:   Differential Diagnosis:   Other: The following diagnoses were also considered and will be evaluated: ovarian cyst, iud displacement and uti.                    Clinical Impression:   Final diagnoses:  [R10.2] Pelvic pain (Primary)  [T83.84XA] Pain due to intrauterine contraceptive device (IUD), initial encounter          ED Disposition Condition    Discharge Stable        ED Prescriptions     Medication Sig Dispense Start Date End Date Auth. Provider    ketorolac (TORADOL)  10 mg tablet Take 1 tablet (10 mg total) by mouth every 6 (six) hours. for 5 days 20 tablet 5/15/2022 5/20/2022 GERRI Hooper        Follow-up Information     Follow up With Specialties Details Why Contact Info    Kwasi Cordova MD Obstetrics Call in 1 day for follow up regarding discomfort and IUD [id number] [facility] [address]  [phone]             GERRI Hooper  05/15/22 5096

## 2022-05-15 NOTE — ED NOTES
Pt arrived via POV c/o lower pelvic pain x 2 days - pain increasing, worse with intercourse. Denies vaginal discharge. Denies fever. Nausea, denies vomiting. Pain 8/10. Unk LMP, has IUD x 4 years, last checked approx 2 years ago. Stabbing pain, intermittent. Radiates to back. Helps to put pressure or change positions. Denies burning with urination. NAD noted. AAOx4.    Upon palpation, RUQ & RLQ, midpelvic, LLQ tenderness

## 2022-07-01 ENCOUNTER — OFFICE VISIT (OUTPATIENT)
Dept: GYNECOLOGY | Facility: CLINIC | Age: 32
End: 2022-07-01
Payer: MEDICAID

## 2022-07-01 VITALS
HEART RATE: 86 BPM | TEMPERATURE: 99 F | WEIGHT: 192 LBS | HEIGHT: 66 IN | SYSTOLIC BLOOD PRESSURE: 139 MMHG | DIASTOLIC BLOOD PRESSURE: 84 MMHG | RESPIRATION RATE: 14 BRPM | BODY MASS INDEX: 30.86 KG/M2

## 2022-07-01 DIAGNOSIS — T83.32XA MALPOSITIONED IUD, INITIAL ENCOUNTER: ICD-10-CM

## 2022-07-01 DIAGNOSIS — Z31.69 ENCOUNTER FOR PRECONCEPTION CONSULTATION: ICD-10-CM

## 2022-07-01 DIAGNOSIS — Z30.9 ENCOUNTER FOR CONTRACEPTIVE MANAGEMENT, UNSPECIFIED TYPE: ICD-10-CM

## 2022-07-01 DIAGNOSIS — Z30.432 ENCOUNTER FOR IUD REMOVAL: Primary | ICD-10-CM

## 2022-07-01 PROCEDURE — 58301 REMOVE INTRAUTERINE DEVICE: CPT | Mod: PBBFAC | Performed by: STUDENT IN AN ORGANIZED HEALTH CARE EDUCATION/TRAINING PROGRAM

## 2022-07-01 PROCEDURE — 99213 OFFICE O/P EST LOW 20 MIN: CPT | Mod: PBBFAC

## 2022-07-01 RX ORDER — ACETAMINOPHEN AND CODEINE PHOSPHATE 120; 12 MG/5ML; MG/5ML
1 SOLUTION ORAL DAILY
Qty: 30 TABLET | Refills: 11 | Status: SHIPPED | OUTPATIENT
Start: 2022-07-01 | End: 2023-05-30

## 2022-07-04 NOTE — PROCEDURES
Removal of IUD    Date/Time: 7/1/2022 9:00 AM  Performed by: Thania Monk MD  Authorized by: Thania Monk MD     Consent obtained:  Verbal  Consent given by:  Patient  Procedure risks and benefits discussed: yes    Patient questions answered: yes    Patient agrees, verbalizes understanding, and wants to proceed: yes    Educational handouts given: no    Instructions and paperwork completed: yes    IUD grasped by: ring forceps  Removal due to infection and inflammatory reaction: no    Removal due to mechanical complications of IUD/Nexplanon: yes    Removed with no complications: yes     Indications for procedure: Malpositioned IUD; Pelvic Pain    Transvaginal US demonstrates IUD in lower uterine segment with tip in internal cervical os. Device angled posteriorly, possibly imbedded in posterior uterine wall.     IUD removal to be performed under ultrasound guidance.         Procedure Details   Pt placed in dorsal lithotomy position, sterile speculum inserted.  The IUD string was visible and grasped with ring forceps. Removed in its entirety with gentle traction under ultrasound guidance. All instruments were removed from the vagina. Patient tolerated the procedure well. Minimal EBL noted.         Dr. Wise present for entire procedure,     Thania Monk MD   [employer] [facility] PGY4    O

## 2022-09-01 ENCOUNTER — OFFICE VISIT (OUTPATIENT)
Dept: FAMILY MEDICINE | Facility: CLINIC | Age: 32
End: 2022-09-01
Payer: MEDICAID

## 2022-09-01 VITALS
HEART RATE: 89 BPM | TEMPERATURE: 99 F | RESPIRATION RATE: 20 BRPM | OXYGEN SATURATION: 97 % | SYSTOLIC BLOOD PRESSURE: 112 MMHG | WEIGHT: 190 LBS | HEIGHT: 66 IN | BODY MASS INDEX: 30.53 KG/M2 | DIASTOLIC BLOOD PRESSURE: 73 MMHG

## 2022-09-01 DIAGNOSIS — I10 PRIMARY HYPERTENSION: ICD-10-CM

## 2022-09-01 DIAGNOSIS — I10 HYPERTENSION, UNSPECIFIED TYPE: Primary | ICD-10-CM

## 2022-09-01 DIAGNOSIS — G24.5 EYE TWITCH: ICD-10-CM

## 2022-09-01 LAB
ALBUMIN SERPL-MCNC: 4.1 GM/DL (ref 3.5–5)
ALBUMIN/GLOB SERPL: 1.3 RATIO (ref 1.1–2)
ALP SERPL-CCNC: 49 UNIT/L (ref 40–150)
ALT SERPL-CCNC: 15 UNIT/L (ref 0–55)
AST SERPL-CCNC: 16 UNIT/L (ref 5–34)
BILIRUBIN DIRECT+TOT PNL SERPL-MCNC: 0.3 MG/DL
BUN SERPL-MCNC: 15.3 MG/DL (ref 7–18.7)
CALCIUM SERPL-MCNC: 10 MG/DL (ref 8.4–10.2)
CHLORIDE SERPL-SCNC: 106 MMOL/L (ref 98–107)
CO2 SERPL-SCNC: 28 MMOL/L (ref 22–29)
CREAT SERPL-MCNC: 0.96 MG/DL (ref 0.55–1.02)
GFR SERPLBLD CREATININE-BSD FMLA CKD-EPI: >60 MLS/MIN/1.73/M2
GLOBULIN SER-MCNC: 3.1 GM/DL (ref 2.4–3.5)
GLUCOSE SERPL-MCNC: 87 MG/DL (ref 74–100)
POTASSIUM SERPL-SCNC: 4.1 MMOL/L (ref 3.5–5.1)
PROT SERPL-MCNC: 7.2 GM/DL (ref 6.4–8.3)
SODIUM SERPL-SCNC: 140 MMOL/L (ref 136–145)

## 2022-09-01 PROCEDURE — 99214 PR OFFICE/OUTPT VISIT, EST, LEVL IV, 30-39 MIN: ICD-10-PCS | Mod: S$PBB,,, | Performed by: STUDENT IN AN ORGANIZED HEALTH CARE EDUCATION/TRAINING PROGRAM

## 2022-09-01 PROCEDURE — 4010F PR ACE/ARB THEARPY RXD/TAKEN: ICD-10-PCS | Mod: CPTII,,, | Performed by: STUDENT IN AN ORGANIZED HEALTH CARE EDUCATION/TRAINING PROGRAM

## 2022-09-01 PROCEDURE — 4010F ACE/ARB THERAPY RXD/TAKEN: CPT | Mod: CPTII,,, | Performed by: STUDENT IN AN ORGANIZED HEALTH CARE EDUCATION/TRAINING PROGRAM

## 2022-09-01 PROCEDURE — 99214 OFFICE O/P EST MOD 30 MIN: CPT | Mod: S$PBB,,, | Performed by: STUDENT IN AN ORGANIZED HEALTH CARE EDUCATION/TRAINING PROGRAM

## 2022-09-01 PROCEDURE — 3074F SYST BP LT 130 MM HG: CPT | Mod: CPTII,,, | Performed by: STUDENT IN AN ORGANIZED HEALTH CARE EDUCATION/TRAINING PROGRAM

## 2022-09-01 PROCEDURE — 3008F BODY MASS INDEX DOCD: CPT | Mod: CPTII,,, | Performed by: STUDENT IN AN ORGANIZED HEALTH CARE EDUCATION/TRAINING PROGRAM

## 2022-09-01 PROCEDURE — 99213 OFFICE O/P EST LOW 20 MIN: CPT | Mod: PBBFAC,PN | Performed by: STUDENT IN AN ORGANIZED HEALTH CARE EDUCATION/TRAINING PROGRAM

## 2022-09-01 PROCEDURE — 36415 COLL VENOUS BLD VENIPUNCTURE: CPT | Performed by: STUDENT IN AN ORGANIZED HEALTH CARE EDUCATION/TRAINING PROGRAM

## 2022-09-01 PROCEDURE — 3078F DIAST BP <80 MM HG: CPT | Mod: CPTII,,, | Performed by: STUDENT IN AN ORGANIZED HEALTH CARE EDUCATION/TRAINING PROGRAM

## 2022-09-01 PROCEDURE — 3074F PR MOST RECENT SYSTOLIC BLOOD PRESSURE < 130 MM HG: ICD-10-PCS | Mod: CPTII,,, | Performed by: STUDENT IN AN ORGANIZED HEALTH CARE EDUCATION/TRAINING PROGRAM

## 2022-09-01 PROCEDURE — 3008F PR BODY MASS INDEX (BMI) DOCUMENTED: ICD-10-PCS | Mod: CPTII,,, | Performed by: STUDENT IN AN ORGANIZED HEALTH CARE EDUCATION/TRAINING PROGRAM

## 2022-09-01 PROCEDURE — 80053 COMPREHEN METABOLIC PANEL: CPT | Performed by: STUDENT IN AN ORGANIZED HEALTH CARE EDUCATION/TRAINING PROGRAM

## 2022-09-01 PROCEDURE — 3078F PR MOST RECENT DIASTOLIC BLOOD PRESSURE < 80 MM HG: ICD-10-PCS | Mod: CPTII,,, | Performed by: STUDENT IN AN ORGANIZED HEALTH CARE EDUCATION/TRAINING PROGRAM

## 2022-09-01 RX ORDER — LISINOPRIL AND HYDROCHLOROTHIAZIDE 20; 25 MG/1; MG/1
1 TABLET ORAL DAILY
COMMUNITY
Start: 2022-08-23 | End: 2023-01-20

## 2022-09-01 RX ORDER — AMLODIPINE BESYLATE 10 MG/1
10 TABLET ORAL DAILY
COMMUNITY
Start: 2022-08-23 | End: 2023-03-30 | Stop reason: SDUPTHER

## 2022-09-02 PROBLEM — I10 PRIMARY HYPERTENSION: Status: ACTIVE | Noted: 2022-09-02

## 2022-09-02 PROBLEM — G24.5 EYE TWITCH: Status: ACTIVE | Noted: 2022-09-02

## 2022-09-02 NOTE — PROGRESS NOTES
Subjective:       Patient ID: Savanah Pang is a 32 y.o. female.    Chief Complaint: Follow-up    HPI  32 year old presents to clinic for follow up      Only complaint today is eye twitching  States that she is not stressed about anything and this just started to happen a few weeks ago    Hypertension  amlodipine 10 mg   hydrochlorothiazide lisinopril 25/20  Home blood pressures and repeat office blood pressures WNL.  Denies issues with medications  Denies shortness of breath, chest pain  Has been doing cardio    Anemia  Patient with H&H of 11.9/36.8 was previously also low  Denies bleeding in urine and UA in ED does not show red blood cells  Denies bleeding gums or bleeding in her feces  Had IUD removed 7/4/2022 and is now on OCP.   Iron panel performed last visit and iron noted to be within normal limits  Fractionated hemoglobin WNL no sickle cell disease or Thalassemia trait  Retic count is normal      HM  Flu vaccine - declines    Review of Systems   Constitutional:  Negative for chills, fever and unexpected weight change.   HENT:  Negative for nasal congestion.    Respiratory:  Negative for cough, shortness of breath and wheezing.    Cardiovascular:  Negative for chest pain and leg swelling.   Genitourinary:  Negative for dysuria.   Integumentary:  Negative for rash and wound.   Neurological:  Negative for dizziness and syncope.   Psychiatric/Behavioral: Negative.         Objective:      Physical Exam    Assessment:       Problem List Items Addressed This Visit    None  Visit Diagnoses       Hypertension, unspecified type    -  Primary    Relevant Orders    Comprehensive Metabolic Panel (Completed)              Plan:       Problem List Items Addressed This Visit          Ophtho    Eye twitch    Overview     Discussed etiology of twitch with no apparent cause per literature or effective treatment.   Discussed anxiety and relation to twitch  Advised warm compress and stated that it tends to wax and wane.   CMP  for calcium level            Cardiac/Vascular    Primary hypertension    Overview     CMP to assess renal function today           Other Visit Diagnoses       Hypertension, unspecified type    -  Primary    Relevant Orders    Comprehensive Metabolic Panel (Completed)           Follow up in about 6 months (around 3/1/2023) for Hypertension.

## 2022-09-15 ENCOUNTER — HISTORICAL (OUTPATIENT)
Dept: ADMINISTRATIVE | Facility: HOSPITAL | Age: 32
End: 2022-09-15
Payer: MEDICAID

## 2022-09-30 ENCOUNTER — DOCUMENTATION ONLY (OUTPATIENT)
Dept: ADMINISTRATIVE | Facility: HOSPITAL | Age: 32
End: 2022-09-30
Payer: MEDICAID

## 2023-01-30 ENCOUNTER — HOSPITAL ENCOUNTER (EMERGENCY)
Facility: HOSPITAL | Age: 33
Discharge: LEFT BEFORE DISCHARGE | End: 2023-01-30
Payer: MEDICAID

## 2023-01-30 VITALS
DIASTOLIC BLOOD PRESSURE: 90 MMHG | WEIGHT: 187 LBS | OXYGEN SATURATION: 100 % | RESPIRATION RATE: 16 BRPM | HEART RATE: 90 BPM | TEMPERATURE: 98 F | HEIGHT: 66 IN | BODY MASS INDEX: 30.05 KG/M2 | SYSTOLIC BLOOD PRESSURE: 138 MMHG

## 2023-01-30 PROCEDURE — 25000003 PHARM REV CODE 250: Performed by: NURSE PRACTITIONER

## 2023-01-30 PROCEDURE — 99283 EMERGENCY DEPT VISIT LOW MDM: CPT | Mod: 25

## 2023-01-30 RX ORDER — BUTALBITAL, ACETAMINOPHEN AND CAFFEINE 50; 325; 40 MG/1; MG/1; MG/1
1 TABLET ORAL
Status: COMPLETED | OUTPATIENT
Start: 2023-01-30 | End: 2023-01-30

## 2023-01-30 RX ADMIN — BUTALBITAL, ACETAMINOPHEN, AND CAFFEINE 1 TABLET: 50; 325; 40 TABLET ORAL at 02:01

## 2023-01-30 NOTE — FIRST PROVIDER EVALUATION
Medical screening examination initiated.  I have conducted a focused provider triage encounter, findings are as follows:    Brief history of present illness:  31 y/o female who presents with migraine since Saturday morning. Tried aleve, no relief. Hx htn. Some intermittent nausea. No vomiting. No dizziness. No change in vision.     There were no vitals filed for this visit.    Pertinent physical exam:  alert, nonlabored, ambulatory    Brief workup plan:  medication    Preliminary workup initiated; this workup will be continued and followed by the physician or advanced practice provider that is assigned to the patient when roomed.

## 2023-03-28 ENCOUNTER — PATIENT MESSAGE (OUTPATIENT)
Dept: RESEARCH | Facility: HOSPITAL | Age: 33
End: 2023-03-28
Payer: MEDICAID

## 2023-03-30 ENCOUNTER — OFFICE VISIT (OUTPATIENT)
Dept: FAMILY MEDICINE | Facility: CLINIC | Age: 33
End: 2023-03-30
Payer: MEDICAID

## 2023-03-30 VITALS
DIASTOLIC BLOOD PRESSURE: 88 MMHG | WEIGHT: 190 LBS | OXYGEN SATURATION: 100 % | HEIGHT: 66 IN | HEART RATE: 86 BPM | BODY MASS INDEX: 30.53 KG/M2 | RESPIRATION RATE: 20 BRPM | SYSTOLIC BLOOD PRESSURE: 131 MMHG | TEMPERATURE: 99 F

## 2023-03-30 DIAGNOSIS — M54.32 SCIATIC NERVE PAIN, LEFT: ICD-10-CM

## 2023-03-30 DIAGNOSIS — M54.32 LEFT SCIATIC NERVE PAIN: Primary | ICD-10-CM

## 2023-03-30 DIAGNOSIS — I10 PRIMARY HYPERTENSION: ICD-10-CM

## 2023-03-30 PROCEDURE — 3075F PR MOST RECENT SYSTOLIC BLOOD PRESS GE 130-139MM HG: ICD-10-PCS | Mod: CPTII,,, | Performed by: STUDENT IN AN ORGANIZED HEALTH CARE EDUCATION/TRAINING PROGRAM

## 2023-03-30 PROCEDURE — 4010F PR ACE/ARB THEARPY RXD/TAKEN: ICD-10-PCS | Mod: CPTII,,, | Performed by: STUDENT IN AN ORGANIZED HEALTH CARE EDUCATION/TRAINING PROGRAM

## 2023-03-30 PROCEDURE — 1159F MED LIST DOCD IN RCRD: CPT | Mod: CPTII,,, | Performed by: STUDENT IN AN ORGANIZED HEALTH CARE EDUCATION/TRAINING PROGRAM

## 2023-03-30 PROCEDURE — 99214 PR OFFICE/OUTPT VISIT, EST, LEVL IV, 30-39 MIN: ICD-10-PCS | Mod: S$PBB,,, | Performed by: STUDENT IN AN ORGANIZED HEALTH CARE EDUCATION/TRAINING PROGRAM

## 2023-03-30 PROCEDURE — 99213 OFFICE O/P EST LOW 20 MIN: CPT | Mod: PBBFAC,PN | Performed by: STUDENT IN AN ORGANIZED HEALTH CARE EDUCATION/TRAINING PROGRAM

## 2023-03-30 PROCEDURE — 3008F PR BODY MASS INDEX (BMI) DOCUMENTED: ICD-10-PCS | Mod: CPTII,,, | Performed by: STUDENT IN AN ORGANIZED HEALTH CARE EDUCATION/TRAINING PROGRAM

## 2023-03-30 PROCEDURE — 4010F ACE/ARB THERAPY RXD/TAKEN: CPT | Mod: CPTII,,, | Performed by: STUDENT IN AN ORGANIZED HEALTH CARE EDUCATION/TRAINING PROGRAM

## 2023-03-30 PROCEDURE — 3079F PR MOST RECENT DIASTOLIC BLOOD PRESSURE 80-89 MM HG: ICD-10-PCS | Mod: CPTII,,, | Performed by: STUDENT IN AN ORGANIZED HEALTH CARE EDUCATION/TRAINING PROGRAM

## 2023-03-30 PROCEDURE — 1159F PR MEDICATION LIST DOCUMENTED IN MEDICAL RECORD: ICD-10-PCS | Mod: CPTII,,, | Performed by: STUDENT IN AN ORGANIZED HEALTH CARE EDUCATION/TRAINING PROGRAM

## 2023-03-30 PROCEDURE — 3079F DIAST BP 80-89 MM HG: CPT | Mod: CPTII,,, | Performed by: STUDENT IN AN ORGANIZED HEALTH CARE EDUCATION/TRAINING PROGRAM

## 2023-03-30 PROCEDURE — 3075F SYST BP GE 130 - 139MM HG: CPT | Mod: CPTII,,, | Performed by: STUDENT IN AN ORGANIZED HEALTH CARE EDUCATION/TRAINING PROGRAM

## 2023-03-30 PROCEDURE — 3008F BODY MASS INDEX DOCD: CPT | Mod: CPTII,,, | Performed by: STUDENT IN AN ORGANIZED HEALTH CARE EDUCATION/TRAINING PROGRAM

## 2023-03-30 PROCEDURE — 99214 OFFICE O/P EST MOD 30 MIN: CPT | Mod: S$PBB,,, | Performed by: STUDENT IN AN ORGANIZED HEALTH CARE EDUCATION/TRAINING PROGRAM

## 2023-03-30 RX ORDER — GABAPENTIN 300 MG/1
300 CAPSULE ORAL 3 TIMES DAILY
Qty: 90 CAPSULE | Refills: 11 | Status: SHIPPED | OUTPATIENT
Start: 2023-03-30 | End: 2023-05-30

## 2023-03-30 RX ORDER — LISINOPRIL AND HYDROCHLOROTHIAZIDE 20; 25 MG/1; MG/1
1 TABLET ORAL DAILY
Qty: 30 TABLET | Refills: 11 | Status: SHIPPED | OUTPATIENT
Start: 2023-03-30 | End: 2023-05-30

## 2023-03-30 RX ORDER — AMLODIPINE BESYLATE 10 MG/1
10 TABLET ORAL DAILY
Qty: 30 TABLET | Refills: 11 | Status: SHIPPED | OUTPATIENT
Start: 2023-03-30 | End: 2023-05-30

## 2023-03-30 NOTE — PROGRESS NOTES
Subjective:       Patient ID: Savanah Pang is a 32 y.o. female.    Chief Complaint: Follow-up    HPI  32 year old presents to clinic for follow up    Left sciatic nerve pain  States she has been running and thinks she may have pulled a muscle having left-sided nerve pain which shoots from her buttocks down to her knee and sometimes into her toes  Has been ongoing for about 2 months   Rates the pain as 6/10  Amenable to physical therapy    Hypertension  amlodipine 10 mg   hydrochlorothiazide lisinopril 25/20  Home blood pressures and repeat office blood pressures WNL.  Denies issues with medications  Denies shortness of breath, chest pain  Has been doing cardio    Anemia  Patient with H&H of 11.9/36.8 was previously also low  Denies bleeding in urine and UA in ED does not show red blood cells  Denies bleeding gums or bleeding in her feces  Had IUD removed 7/4/2022 and is now on OCP.   Iron panel performed last visit and iron noted to be within normal limits  Fractionated hemoglobin WNL no sickle cell disease or Thalassemia trait  Retic count is normal      HM  Flu vaccine - declines    Review of Systems   Constitutional:  Negative for chills, fever and unexpected weight change.   HENT:  Negative for nasal congestion.    Respiratory:  Negative for cough, shortness of breath and wheezing.    Cardiovascular:  Negative for chest pain and leg swelling.   Genitourinary:  Negative for dysuria.   Musculoskeletal:  Positive for back pain and leg pain.   Integumentary:  Negative for rash and wound.   Neurological:  Negative for dizziness and syncope.   Psychiatric/Behavioral: Negative.         Objective:      Physical Exam  Constitutional:       General: She is not in acute distress.  Eyes:      General: No scleral icterus.     Extraocular Movements: Extraocular movements intact.      Conjunctiva/sclera: Conjunctivae normal.      Pupils: Pupils are equal, round, and reactive to light.   Cardiovascular:      Rate and Rhythm:  Normal rate and regular rhythm.      Heart sounds: No murmur heard.  Pulmonary:      Effort: Pulmonary effort is normal. No respiratory distress.      Breath sounds: No stridor. No wheezing or rhonchi.   Abdominal:      General: Bowel sounds are normal. There is no distension.      Palpations: Abdomen is soft.      Tenderness: There is no abdominal tenderness. There is no guarding.   Musculoskeletal:         General: No swelling. Normal range of motion.      Comments: Straight leg raise test reproduces pain on left side   Skin:     General: Skin is warm and dry.      Coloration: Skin is not jaundiced.      Findings: No rash.   Neurological:      Mental Status: She is alert.      Gait: Gait normal.   Psychiatric:         Thought Content: Thought content normal.       Assessment:       Problem List Items Addressed This Visit          Cardiac/Vascular    Primary hypertension       Orthopedic    Sciatic nerve pain, left     Other Visit Diagnoses       Left sciatic nerve pain    -  Primary    Relevant Medications    gabapentin (NEURONTIN) 300 MG capsule    Other Relevant Orders    Ambulatory referral/consult to Physical/Occupational Therapy              Plan:       Problem List Items Addressed This Visit          Cardiac/Vascular    Primary hypertension    Overview     Stable  Continuing Amlodipine 10 mg and HCTZ-lisinopril 25/20            Orthopedic    Sciatic nerve pain, left    Overview     Referral placed to physical therapy  Gabapentin 300 mg t.i.d. given the patient with discussion on medication adverse effect          Other Visit Diagnoses       Left sciatic nerve pain    -  Primary    Relevant Medications    gabapentin (NEURONTIN) 300 MG capsule    Other Relevant Orders    Ambulatory referral/consult to Physical/Occupational Therapy           Problem List Items Addressed This Visit          Cardiac/Vascular    Primary hypertension    Overview     Stable  Continuing Amlodipine 10 mg and HCTZ-lisinopril 25/20             Orthopedic    Sciatic nerve pain, left    Overview     Referral placed to physical therapy  Gabapentin 300 mg t.i.d. given the patient with discussion on medication adverse effect          Other Visit Diagnoses       Left sciatic nerve pain    -  Primary    Relevant Medications    gabapentin (NEURONTIN) 300 MG capsule    Other Relevant Orders    Ambulatory referral/consult to Physical/Occupational Therapy           Follow up in about 6 months (around 9/30/2023) for Hypertension, BP check.

## 2023-03-31 PROBLEM — M54.32 SCIATIC NERVE PAIN, LEFT: Status: ACTIVE | Noted: 2023-03-31

## 2023-05-16 ENCOUNTER — PATIENT MESSAGE (OUTPATIENT)
Dept: ADMINISTRATIVE | Facility: HOSPITAL | Age: 33
End: 2023-05-16
Payer: MEDICAID

## 2023-05-17 ENCOUNTER — TELEPHONE (OUTPATIENT)
Dept: ADMINISTRATIVE | Facility: HOSPITAL | Age: 33
End: 2023-05-17
Payer: MEDICAID

## 2023-05-17 NOTE — TELEPHONE ENCOUNTER
Pt would like to be contacted to make an appt to come in for a pap smear.  Thank you for all that you do.

## 2023-05-30 ENCOUNTER — OFFICE VISIT (OUTPATIENT)
Dept: FAMILY MEDICINE | Facility: CLINIC | Age: 33
End: 2023-05-30
Payer: MEDICAID

## 2023-05-30 VITALS
SYSTOLIC BLOOD PRESSURE: 174 MMHG | OXYGEN SATURATION: 99 % | HEART RATE: 71 BPM | RESPIRATION RATE: 20 BRPM | TEMPERATURE: 99 F | DIASTOLIC BLOOD PRESSURE: 116 MMHG

## 2023-05-30 DIAGNOSIS — Z3A.08 8 WEEKS GESTATION OF PREGNANCY: Primary | ICD-10-CM

## 2023-05-30 DIAGNOSIS — I10 PRIMARY HYPERTENSION: ICD-10-CM

## 2023-05-30 DIAGNOSIS — M54.32 SCIATIC NERVE PAIN, LEFT: ICD-10-CM

## 2023-05-30 PROCEDURE — 3080F PR MOST RECENT DIASTOLIC BLOOD PRESSURE >= 90 MM HG: ICD-10-PCS | Mod: CPTII,,, | Performed by: STUDENT IN AN ORGANIZED HEALTH CARE EDUCATION/TRAINING PROGRAM

## 2023-05-30 PROCEDURE — 3077F PR MOST RECENT SYSTOLIC BLOOD PRESSURE >= 140 MM HG: ICD-10-PCS | Mod: CPTII,,, | Performed by: STUDENT IN AN ORGANIZED HEALTH CARE EDUCATION/TRAINING PROGRAM

## 2023-05-30 PROCEDURE — 99214 OFFICE O/P EST MOD 30 MIN: CPT | Mod: S$PBB,,, | Performed by: STUDENT IN AN ORGANIZED HEALTH CARE EDUCATION/TRAINING PROGRAM

## 2023-05-30 PROCEDURE — 99214 PR OFFICE/OUTPT VISIT, EST, LEVL IV, 30-39 MIN: ICD-10-PCS | Mod: S$PBB,,, | Performed by: STUDENT IN AN ORGANIZED HEALTH CARE EDUCATION/TRAINING PROGRAM

## 2023-05-30 PROCEDURE — 3080F DIAST BP >= 90 MM HG: CPT | Mod: CPTII,,, | Performed by: STUDENT IN AN ORGANIZED HEALTH CARE EDUCATION/TRAINING PROGRAM

## 2023-05-30 PROCEDURE — 3077F SYST BP >= 140 MM HG: CPT | Mod: CPTII,,, | Performed by: STUDENT IN AN ORGANIZED HEALTH CARE EDUCATION/TRAINING PROGRAM

## 2023-05-30 PROCEDURE — 4010F ACE/ARB THERAPY RXD/TAKEN: CPT | Mod: CPTII,,, | Performed by: STUDENT IN AN ORGANIZED HEALTH CARE EDUCATION/TRAINING PROGRAM

## 2023-05-30 PROCEDURE — 99213 OFFICE O/P EST LOW 20 MIN: CPT | Mod: PBBFAC,PN | Performed by: STUDENT IN AN ORGANIZED HEALTH CARE EDUCATION/TRAINING PROGRAM

## 2023-05-30 PROCEDURE — 4010F PR ACE/ARB THEARPY RXD/TAKEN: ICD-10-PCS | Mod: CPTII,,, | Performed by: STUDENT IN AN ORGANIZED HEALTH CARE EDUCATION/TRAINING PROGRAM

## 2023-05-30 RX ORDER — LABETALOL 100 MG/1
100 TABLET, FILM COATED ORAL 2 TIMES DAILY
Qty: 60 TABLET | Refills: 1 | Status: SHIPPED | OUTPATIENT
Start: 2023-05-30 | End: 2024-05-29

## 2023-05-30 NOTE — PROGRESS NOTES
Subjective:       Patient ID: Savanah Pang is a 33 y.o. female.    Chief Complaint: Follow-up (F/u 6th month)    HPI  32 year old presents to clinic for follow up with new issues of    Pregnant   Approximately 8 weeks gestational age  Went to ER for elevated blood pressure and was given labetalol IV but not discharged on any blood pressure medicine  Also given prenatal vitamins  Blood pressure 174/116    Left sciatic nerve pain  States she has been running and thinks she may have pulled a muscle having left-sided nerve pain which shoots from her buttocks down to her knee and sometimes into her toes  Has been ongoing for about 2 months   Rates the pain as 6/10  Amenable to physical therapy    Hypertension  Patient hypertensive in clinic as she was stopped on her blood pressure medications in the ER    Anemia  Patient with H&H of 11.9/36.8 was previously also low  Denies bleeding in urine and UA in ED does not show red blood cells  Denies bleeding gums or bleeding in her feces  Had IUD removed 7/4/2022 and is now on OCP.   Iron panel performed last visit and iron noted to be within normal limits  Fractionated hemoglobin WNL no sickle cell disease or Thalassemia trait  Retic count is normal      HM  Flu vaccine - declines    Review of Systems   Constitutional:  Negative for chills, fever and unexpected weight change.   HENT:  Negative for nasal congestion.    Respiratory:  Negative for cough, shortness of breath and wheezing.    Cardiovascular:  Negative for chest pain and leg swelling.   Genitourinary:  Negative for dysuria.   Musculoskeletal:  Positive for leg pain. Negative for back pain.   Integumentary:  Negative for rash and wound.   Neurological:  Negative for dizziness and syncope.   Psychiatric/Behavioral: Negative.         Objective:      Physical Exam  Constitutional:       General: She is not in acute distress.  Eyes:      General: No scleral icterus.     Extraocular Movements: Extraocular movements  intact.      Conjunctiva/sclera: Conjunctivae normal.      Pupils: Pupils are equal, round, and reactive to light.   Cardiovascular:      Rate and Rhythm: Normal rate and regular rhythm.      Heart sounds: No murmur heard.  Pulmonary:      Effort: Pulmonary effort is normal. No respiratory distress.      Breath sounds: No stridor. No wheezing or rhonchi.   Abdominal:      General: Bowel sounds are normal. There is no distension.      Palpations: Abdomen is soft.      Tenderness: There is no abdominal tenderness. There is no guarding.   Musculoskeletal:         General: No swelling. Normal range of motion.      Comments: Straight leg raise test reproduces pain on left side   Skin:     General: Skin is warm and dry.      Coloration: Skin is not jaundiced.      Findings: No rash.   Neurological:      Mental Status: She is alert.      Gait: Gait normal.   Psychiatric:         Thought Content: Thought content normal.       Assessment:       Problem List Items Addressed This Visit          Cardiac/Vascular    Primary hypertension       Obstetric    8 weeks gestation of pregnancy - Primary    Relevant Orders    Ambulatory referral/consult to Obstetrics / Gynecology       Orthopedic    Sciatic nerve pain, left         Plan:       Problem List Items Addressed This Visit          Cardiac/Vascular    Primary hypertension    Overview     Uncontrolled as patient had to be stopped on hydrochlorothiazide lisinopril and amlodipine in the ED  Attempting labetalol 100 b.i.d. but will likely need to increase            Obstetric    8 weeks gestation of pregnancy - Primary    Overview     Placed referral to [facility]  Gave patient list of safe medications in pregnancy  Labetalol 100 mg b.i.d. may need to increase  Assured patient is taking prenatal vitamins         Relevant Orders    Ambulatory referral/consult to Obstetrics / Gynecology       Orthopedic    Sciatic nerve pain, left    Overview     Patient is attending  physical therapy can not take gabapentin advised of Tylenol for pain may continue physical therapy           Problem List Items Addressed This Visit          Cardiac/Vascular    Primary hypertension    Overview     Uncontrolled as patient had to be stopped on hydrochlorothiazide lisinopril and amlodipine in the ED  Attempting labetalol 100 b.i.d. but will likely need to increase            Obstetric    8 weeks gestation of pregnancy - Primary    Overview     Placed referral to [facility]  Gave patient list of safe medications in pregnancy  Labetalol 100 mg b.i.d. may need to increase  Assured patient is taking prenatal vitamins         Relevant Orders    Ambulatory referral/consult to Obstetrics / Gynecology       Orthopedic    Sciatic nerve pain, left    Overview     Patient is attending physical therapy can not take gabapentin advised of Tylenol for pain may continue physical therapy           Follow up in about 1 week (around 6/6/2023) for Hypertension, BP check, ok to overboook.

## 2023-06-06 ENCOUNTER — OFFICE VISIT (OUTPATIENT)
Dept: FAMILY MEDICINE | Facility: CLINIC | Age: 33
End: 2023-06-06
Payer: MEDICAID

## 2023-06-06 VITALS
OXYGEN SATURATION: 99 % | RESPIRATION RATE: 20 BRPM | DIASTOLIC BLOOD PRESSURE: 83 MMHG | TEMPERATURE: 98 F | HEART RATE: 78 BPM | BODY MASS INDEX: 30.22 KG/M2 | SYSTOLIC BLOOD PRESSURE: 125 MMHG | HEIGHT: 66 IN | WEIGHT: 188 LBS

## 2023-06-06 DIAGNOSIS — I10 PRIMARY HYPERTENSION: ICD-10-CM

## 2023-06-06 DIAGNOSIS — Z3A.08 8 WEEKS GESTATION OF PREGNANCY: ICD-10-CM

## 2023-06-06 PROCEDURE — 99213 OFFICE O/P EST LOW 20 MIN: CPT | Mod: PBBFAC,PN | Performed by: STUDENT IN AN ORGANIZED HEALTH CARE EDUCATION/TRAINING PROGRAM

## 2023-06-06 PROCEDURE — 99213 OFFICE O/P EST LOW 20 MIN: CPT | Mod: S$PBB,,, | Performed by: STUDENT IN AN ORGANIZED HEALTH CARE EDUCATION/TRAINING PROGRAM

## 2023-06-06 PROCEDURE — 99213 PR OFFICE/OUTPT VISIT, EST, LEVL III, 20-29 MIN: ICD-10-PCS | Mod: S$PBB,,, | Performed by: STUDENT IN AN ORGANIZED HEALTH CARE EDUCATION/TRAINING PROGRAM

## 2023-06-06 PROCEDURE — 3074F SYST BP LT 130 MM HG: CPT | Mod: CPTII,,, | Performed by: STUDENT IN AN ORGANIZED HEALTH CARE EDUCATION/TRAINING PROGRAM

## 2023-06-06 PROCEDURE — 4010F ACE/ARB THERAPY RXD/TAKEN: CPT | Mod: CPTII,,, | Performed by: STUDENT IN AN ORGANIZED HEALTH CARE EDUCATION/TRAINING PROGRAM

## 2023-06-06 PROCEDURE — 3074F PR MOST RECENT SYSTOLIC BLOOD PRESSURE < 130 MM HG: ICD-10-PCS | Mod: CPTII,,, | Performed by: STUDENT IN AN ORGANIZED HEALTH CARE EDUCATION/TRAINING PROGRAM

## 2023-06-06 PROCEDURE — 3079F DIAST BP 80-89 MM HG: CPT | Mod: CPTII,,, | Performed by: STUDENT IN AN ORGANIZED HEALTH CARE EDUCATION/TRAINING PROGRAM

## 2023-06-06 PROCEDURE — 3008F BODY MASS INDEX DOCD: CPT | Mod: CPTII,,, | Performed by: STUDENT IN AN ORGANIZED HEALTH CARE EDUCATION/TRAINING PROGRAM

## 2023-06-06 PROCEDURE — 3008F PR BODY MASS INDEX (BMI) DOCUMENTED: ICD-10-PCS | Mod: CPTII,,, | Performed by: STUDENT IN AN ORGANIZED HEALTH CARE EDUCATION/TRAINING PROGRAM

## 2023-06-06 PROCEDURE — 3079F PR MOST RECENT DIASTOLIC BLOOD PRESSURE 80-89 MM HG: ICD-10-PCS | Mod: CPTII,,, | Performed by: STUDENT IN AN ORGANIZED HEALTH CARE EDUCATION/TRAINING PROGRAM

## 2023-06-06 PROCEDURE — 4010F PR ACE/ARB THEARPY RXD/TAKEN: ICD-10-PCS | Mod: CPTII,,, | Performed by: STUDENT IN AN ORGANIZED HEALTH CARE EDUCATION/TRAINING PROGRAM

## 2023-06-06 NOTE — PROGRESS NOTES
Subjective:       Patient ID: Savanah Pang is a 33 y.o. female.    Chief Complaint: Follow-up (1wk f/u HTN ,B/p check )    HPI  32 year old presents to clinic for follow up with new issues of    Pregnant   Approximately 9 weeks gestational age  Started on labetalol mg b.i.d. at last clinic visit  Blood pressure today in clinic 125/83  Patient reports that she is feeling well did have small  Headache but relieved with Tylenol  Has appointment for ultrasound next month and will be seen by [facility]      Josh  Patient with H&H of 11.9/36.8 was previously also low  Denies bleeding in urine and UA in ED does not show red blood cells  Denies bleeding gums or bleeding in her feces  Had IUD removed 7/4/2022 and is now on OCP.   Iron panel performed last visit and iron noted to be within normal limits  Fractionated hemoglobin WNL no sickle cell disease or Thalassemia trait  Retic count is normal      HM  Flu vaccine - declines    Review of Systems   Constitutional:  Negative for chills, fever and unexpected weight change.   HENT:  Negative for nasal congestion.    Respiratory:  Negative for cough, shortness of breath and wheezing.    Cardiovascular:  Negative for chest pain and leg swelling.   Genitourinary:  Negative for dysuria.   Musculoskeletal:  Positive for leg pain. Negative for back pain.   Integumentary:  Negative for rash and wound.   Neurological:  Negative for dizziness and syncope.   Psychiatric/Behavioral: Negative.         Objective:      Physical Exam  Constitutional:       General: She is not in acute distress.  Eyes:      General: No scleral icterus.     Extraocular Movements: Extraocular movements intact.      Conjunctiva/sclera: Conjunctivae normal.      Pupils: Pupils are equal, round, and reactive to light.   Cardiovascular:      Rate and Rhythm: Normal rate and regular rhythm.      Heart sounds: No murmur heard.  Pulmonary:      Effort: Pulmonary effort is normal. No respiratory distress.       Breath sounds: No stridor. No wheezing or rhonchi.   Abdominal:      General: Bowel sounds are normal. There is no distension.      Palpations: Abdomen is soft.      Tenderness: There is no abdominal tenderness. There is no guarding.   Musculoskeletal:         General: No swelling. Normal range of motion.      Comments: Straight leg raise test reproduces pain on left side   Skin:     General: Skin is warm and dry.      Coloration: Skin is not jaundiced.      Findings: No rash.   Neurological:      Mental Status: She is alert.      Gait: Gait normal.   Psychiatric:         Thought Content: Thought content normal.       Assessment:       Problem List Items Addressed This Visit          Cardiac/Vascular    Primary hypertension       Obstetric    8 weeks gestation of pregnancy         Plan:       Problem List Items Addressed This Visit          Cardiac/Vascular    Primary hypertension    Overview     Gave patient prescription for labetalol 100 b.i.d.  Patient does have follow-up in [facility]            Obstetric    8 weeks gestation of pregnancy    Overview     Patient given medication list on safe medications in pregnancy and prescribed prenatal vitamins will be set up with Family [facility] next month           Problem List Items Addressed This Visit          Cardiac/Vascular    Primary hypertension    Overview     Gave patient prescription for labetalol 100 b.i.d.  Patient does have follow-up in [facility]            Obstetric    8 weeks gestation of pregnancy    Overview     Patient given medication list on safe medications in pregnancy and prescribed prenatal vitamins will be set up with Family [facility] next month           Problem List Items Addressed This Visit          Cardiac/Vascular    Primary hypertension    Overview     Gave patient prescription for labetalol 100 b.i.d.  Patient does have follow-up in [facility]            Obstetric    8 weeks gestation of  pregnancy    Overview     Patient given medication list on safe medications in pregnancy and prescribed prenatal vitamins will be set up with [facility] next month           Follow up in about 10 months (around 4/6/2024) for Wellness.

## 2023-07-10 ENCOUNTER — PATIENT MESSAGE (OUTPATIENT)
Dept: ADMINISTRATIVE | Facility: HOSPITAL | Age: 33
End: 2023-07-10
Payer: MEDICAID

## 2023-10-16 ENCOUNTER — PATIENT MESSAGE (OUTPATIENT)
Dept: ADMINISTRATIVE | Facility: HOSPITAL | Age: 33
End: 2023-10-16
Payer: MEDICAID

## 2024-05-13 PROBLEM — Z3A.08 8 WEEKS GESTATION OF PREGNANCY: Status: RESOLVED | Noted: 2023-05-30 | Resolved: 2024-05-13

## 2024-05-30 ENCOUNTER — HOSPITAL ENCOUNTER (EMERGENCY)
Facility: HOSPITAL | Age: 34
Discharge: HOME | End: 2024-05-30
Attending: STUDENT IN AN ORGANIZED HEALTH CARE EDUCATION/TRAINING PROGRAM
Payer: MEDICAID

## 2024-05-30 VITALS
SYSTOLIC BLOOD PRESSURE: 178 MMHG | DIASTOLIC BLOOD PRESSURE: 114 MMHG | RESPIRATION RATE: 16 BRPM | TEMPERATURE: 97 F | HEART RATE: 68 BPM | HEIGHT: 66 IN | BODY MASS INDEX: 30.53 KG/M2 | WEIGHT: 190 LBS | OXYGEN SATURATION: 98 %

## 2024-05-30 DIAGNOSIS — I10 HYPERTENSION, UNSPECIFIED TYPE: ICD-10-CM

## 2024-05-30 DIAGNOSIS — M53.3 SACROILIAC JOINT PAIN: Primary | ICD-10-CM

## 2024-05-30 LAB — B-HCG UR QL: NEGATIVE

## 2024-05-30 PROCEDURE — 63600175 PHARM REV CODE 636 W HCPCS

## 2024-05-30 PROCEDURE — 63600175 PHARM REV CODE 636 W HCPCS: Performed by: PHYSICIAN ASSISTANT

## 2024-05-30 PROCEDURE — 96372 THER/PROPH/DIAG INJ SC/IM: CPT

## 2024-05-30 PROCEDURE — 96374 THER/PROPH/DIAG INJ IV PUSH: CPT

## 2024-05-30 PROCEDURE — 99284 EMERGENCY DEPT VISIT MOD MDM: CPT | Mod: 25

## 2024-05-30 PROCEDURE — 81025 URINE PREGNANCY TEST: CPT

## 2024-05-30 RX ORDER — DEXAMETHASONE SODIUM PHOSPHATE 4 MG/ML
8 INJECTION, SOLUTION INTRA-ARTICULAR; INTRALESIONAL; INTRAMUSCULAR; INTRAVENOUS; SOFT TISSUE
Status: COMPLETED | OUTPATIENT
Start: 2024-05-30 | End: 2024-05-30

## 2024-05-30 RX ORDER — HYDROCODONE BITARTRATE AND ACETAMINOPHEN 7.5; 325 MG/1; MG/1
1 TABLET ORAL EVERY 6 HOURS PRN
Qty: 12 TABLET | Refills: 0 | Status: SHIPPED | OUTPATIENT
Start: 2024-05-30 | End: 2024-06-12

## 2024-05-30 RX ORDER — HYDRALAZINE HYDROCHLORIDE 20 MG/ML
INJECTION INTRAMUSCULAR; INTRAVENOUS
Status: DISCONTINUED
Start: 2024-05-30 | End: 2024-05-30 | Stop reason: HOSPADM

## 2024-05-30 RX ORDER — HYDRALAZINE HYDROCHLORIDE 20 MG/ML
10 INJECTION INTRAMUSCULAR; INTRAVENOUS
Status: COMPLETED | OUTPATIENT
Start: 2024-05-30 | End: 2024-05-30

## 2024-05-30 RX ORDER — METHOCARBAMOL 500 MG/1
500 TABLET, FILM COATED ORAL 2 TIMES DAILY
Qty: 20 TABLET | Refills: 0 | Status: SHIPPED | OUTPATIENT
Start: 2024-05-30 | End: 2024-06-09

## 2024-05-30 RX ORDER — KETOROLAC TROMETHAMINE 30 MG/ML
30 INJECTION, SOLUTION INTRAMUSCULAR; INTRAVENOUS
Status: COMPLETED | OUTPATIENT
Start: 2024-05-30 | End: 2024-05-30

## 2024-05-30 RX ADMIN — HYDRALAZINE HYDROCHLORIDE 10 MG: 20 INJECTION INTRAMUSCULAR; INTRAVENOUS at 06:05

## 2024-05-30 RX ADMIN — KETOROLAC TROMETHAMINE 30 MG: 30 INJECTION, SOLUTION INTRAMUSCULAR at 05:05

## 2024-05-30 RX ADMIN — DEXAMETHASONE SODIUM PHOSPHATE 8 MG: 4 INJECTION, SOLUTION INTRA-ARTICULAR; INTRALESIONAL; INTRAMUSCULAR; INTRAVENOUS; SOFT TISSUE at 05:05

## 2024-05-30 NOTE — FIRST PROVIDER EVALUATION
"Medical screening examination initiated.  I have conducted a focused provider triage encounter, findings are as follows:    Brief history of present illness:  arrived to ED due to atraumatic R buttock pain that radiates down her leg. Also c/o numbness to toes. Denies injury/trauma. Hx of sciatica.     Vitals:    05/30/24 1654   Pulse: 85   Resp: 18   Temp: 97.3 °F (36.3 °C)   TempSrc: Oral   SpO2: 97%   Weight: 86.2 kg (190 lb)   Height: 5' 6" (1.676 m)       Pertinent physical exam:  awake, alert, has non-labored breathing, and follows commands    Brief workup plan:  labs and medication     Preliminary workup initiated; this workup will be continued and followed by the physician or advanced practice provider that is assigned to the patient when roomed.  "

## 2024-05-31 NOTE — ED PROVIDER NOTES
"Encounter Date: 5/30/2024       History     Chief Complaint   Patient presents with    Hip Pain     Pt reports pain to right hip x 2 weeks. States worsening, down right buttock to 4th and 5th toes. Reports "burning" to affected digits. Pt reports pain when bearing weight and laying on side. Denies injury/trauma.     Patient presents with:  Hip Pain: Pt reports pain to right hip x 2 weeks. States worsening, down right buttock to 4th and 5th toes. Reports "burning" to affected digits. Pt reports pain when bearing weight and laying on side. Denies injury/trauma.          Back Pain   This is a recurrent problem. The problem occurs intermittently. The problem has been waxing and waning. The pain is associated with no known injury. The pain is present in the lumbar spine. The pain does not radiate. Pertinent negatives include no chest pain, no fever, no numbness, no weight loss, no dysuria, no pelvic pain, no leg pain, no tingling and no weakness.     Review of patient's allergies indicates:  No Known Allergies  Past Medical History:   Diagnosis Date    Hypertension      No past surgical history on file.  No family history on file.  Social History     Tobacco Use    Smoking status: Never     Passive exposure: Never    Smokeless tobacco: Never   Substance Use Topics    Alcohol use: Never    Drug use: Never     Review of Systems   Constitutional:  Negative for fever and weight loss.   HENT:  Negative for sore throat.    Respiratory:  Negative for shortness of breath.    Cardiovascular:  Negative for chest pain.   Gastrointestinal:  Negative for nausea.   Genitourinary:  Negative for dysuria and pelvic pain.   Musculoskeletal:  Positive for back pain.   Skin:  Negative for rash.   Neurological:  Negative for tingling, weakness and numbness.   Hematological:  Does not bruise/bleed easily.       Physical Exam     Initial Vitals [05/30/24 1654]   BP Pulse Resp Temp SpO2   (!) 164/118 85 18 97.3 °F (36.3 °C) 97 %      MAP     "   --         Physical Exam    Vitals reviewed.  Constitutional: She appears well-developed and well-nourished.   Cardiovascular:  Normal rate.           Pulmonary/Chest: Breath sounds normal.   Abdominal: Abdomen is soft.   Musculoskeletal:         General: Normal range of motion.      Lumbar back: No spasms or tenderness. Normal range of motion. Negative right straight leg raise test and negative left straight leg raise test.      Comments: Patient complains of right-sided sacroiliac joint pain positive for favor positive for sacral compression no indications of lower extremity radicular pain pattern negative for straight leg raise.     Neurological: She is alert and oriented to person, place, and time. She has normal strength. GCS eye subscore is 4. GCS verbal subscore is 5. GCS motor subscore is 6.   Skin: Skin is warm.   Psychiatric: She has a normal mood and affect. Her behavior is normal. Thought content normal.         ED Course   Procedures  Labs Reviewed   PREGNANCY TEST, URINE RAPID - Normal          Imaging Results    None          Medications   ketorolac injection 30 mg (30 mg Intramuscular Given 5/30/24 1739)   dexAMETHasone injection 8 mg (8 mg Intramuscular Given 5/30/24 1740)   hydrALAZINE injection 10 mg (10 mg Intravenous Given 5/30/24 1846)     Medical Decision Making  34-year-old complains of right-sided buttocks area pain that radiates behind the knee.  She just recently went to see a chiropractor and she says they did some alignment.  Since then she is having worsening right buttocks pain radiating behind the leg and stops at the knee side.  She denies weakness to the lower right lower extremities tingling burning sensations.  Her pain worsened from sitting to standing position.    Amount and/or Complexity of Data Reviewed  Discussion of management or test interpretation with external provider(s): I discussed the diagnosis of the sacroiliac joint pain, advised patient to take muscle relaxant  and providing her a short term were narcotics, we talked about her blood pressure being elevated she does have a primary physician that is monitoring her blood pressure and she has a follow up with the cardiologist in 10 days.  She is currently asymptomatic she denies chest pain shortness of breath nausea vomiting.    Risk  Prescription drug management.       Judging by the patient's chief complaint and pertinent history, the patient has the following possible differential diagnoses, including but not limited to the following.  Some of these are deemed to be lower likelihood and some more likely based on my physical exam and history combined with possible lab work and/or imaging studies.   Please see the pertinent studies, and refer to the HPI.  Some of these diagnoses will take further evaluation to fully rule out, perhaps as an outpatient and the patient was encouraged to follow up when discharged for more comprehensive evaluation.    Sprain, strain, contusion, DDD, disc herniation, spinal stenosis, spondylitis, fracture, mass,                The patient is resting comfortably in no acute distress.  She is hemodynamically stable and is without objective evidence for acute process requiring urgent intervention or hospitalization. I provided counseling to patient with regard to condition, the treatment plan, specific conditions for return, and the importance of follow up. Detailed written and verbal instructions provided to patient and she expressed a verbal understanding of the discharge instructions and overall management plan. Reiterated the importance of medication administration and safety and advised patient to follow up with primary care provider in 3-5 days or sooner if needed.  Answered questions at this time. The patient is stable for discharge.                       Clinical Impression:  Final diagnoses:  [M53.3] Sacroiliac joint pain (Primary)  [I10] Hypertension, unspecified type          ED Disposition  Condition    Discharge Stable          ED Prescriptions       Medication Sig Dispense Start Date End Date Auth. Provider    methocarbamoL (ROBAXIN) 500 MG Tab Take 1 tablet (500 mg total) by mouth 2 (two) times a day. for 10 days 20 tablet 5/30/2024 6/9/2024 Shayy Aguilar PA    HYDROcodone-acetaminophen (NORCO) 7.5-325 mg per tablet Take 1 tablet by mouth every 6 (six) hours as needed for Pain. 12 tablet 5/30/2024 -- Shayy Aguilar PA          Follow-up Information       Follow up With Specialties Details Why Contact Info    [facility] - Emergency Dept Emergency Medicine  If symptoms worsen [address] [id number] [phone]             Shayy Aguilar PA  05/30/24 1955

## 2024-06-11 ENCOUNTER — OFFICE VISIT (OUTPATIENT)
Dept: FAMILY MEDICINE | Facility: CLINIC | Age: 34
End: 2024-06-11
Payer: MEDICAID

## 2024-06-11 VITALS
RESPIRATION RATE: 18 BRPM | HEIGHT: 66 IN | OXYGEN SATURATION: 100 % | BODY MASS INDEX: 30.03 KG/M2 | DIASTOLIC BLOOD PRESSURE: 89 MMHG | HEART RATE: 63 BPM | WEIGHT: 186.88 LBS | SYSTOLIC BLOOD PRESSURE: 143 MMHG | TEMPERATURE: 98 F

## 2024-06-11 DIAGNOSIS — Z00.00 WELLNESS EXAMINATION: ICD-10-CM

## 2024-06-11 DIAGNOSIS — M54.31 SCIATIC NERVE PAIN, RIGHT: ICD-10-CM

## 2024-06-11 DIAGNOSIS — I10 PRIMARY HYPERTENSION: Primary | ICD-10-CM

## 2024-06-11 DIAGNOSIS — M54.32 SCIATIC NERVE PAIN, LEFT: ICD-10-CM

## 2024-06-11 DIAGNOSIS — G57.01 SCIATIC NERVE DISEASE, RIGHT: ICD-10-CM

## 2024-06-11 DIAGNOSIS — D50.0 IRON DEFICIENCY ANEMIA DUE TO CHRONIC BLOOD LOSS: ICD-10-CM

## 2024-06-11 LAB
25(OH)D3+25(OH)D2 SERPL-MCNC: 5 NG/ML (ref 30–80)
ALBUMIN SERPL-MCNC: 3.6 G/DL (ref 3.5–5)
ALBUMIN/GLOB SERPL: 1.3 RATIO (ref 1.1–2)
ALP SERPL-CCNC: 49 UNIT/L (ref 40–150)
ALT SERPL-CCNC: 15 UNIT/L (ref 0–55)
ANION GAP SERPL CALC-SCNC: 7 MEQ/L
AST SERPL-CCNC: 15 UNIT/L (ref 5–34)
BACTERIA #/AREA URNS AUTO: ABNORMAL /HPF
BASOPHILS # BLD AUTO: 0.02 X10(3)/MCL
BASOPHILS NFR BLD AUTO: 0.2 %
BILIRUB SERPL-MCNC: 0.2 MG/DL
BILIRUB UR QL STRIP.AUTO: NEGATIVE
BUN SERPL-MCNC: 14.1 MG/DL (ref 7–18.7)
CALCIUM SERPL-MCNC: 9 MG/DL (ref 8.4–10.2)
CHLORIDE SERPL-SCNC: 108 MMOL/L (ref 98–107)
CHOLEST SERPL-MCNC: 148 MG/DL
CHOLEST/HDLC SERPL: 3 {RATIO} (ref 0–5)
CLARITY UR: CLEAR
CO2 SERPL-SCNC: 26 MMOL/L (ref 22–29)
COLOR UR AUTO: ABNORMAL
CREAT SERPL-MCNC: 1.05 MG/DL (ref 0.55–1.02)
CREAT UR-MCNC: 209.9 MG/DL (ref 45–106)
CREAT/UREA NIT SERPL: 13
EOSINOPHIL # BLD AUTO: 0.04 X10(3)/MCL (ref 0–0.9)
EOSINOPHIL NFR BLD AUTO: 0.4 %
ERYTHROCYTE [DISTWIDTH] IN BLOOD BY AUTOMATED COUNT: 11.7 % (ref 11.5–17)
EST. AVERAGE GLUCOSE BLD GHB EST-MCNC: 93.9 MG/DL
GFR SERPLBLD CREATININE-BSD FMLA CKD-EPI: >60 ML/MIN/1.73/M2
GLOBULIN SER-MCNC: 2.8 GM/DL (ref 2.4–3.5)
GLUCOSE SERPL-MCNC: 87 MG/DL (ref 74–100)
GLUCOSE UR QL STRIP: NORMAL
HBA1C MFR BLD: 4.9 %
HCT VFR BLD AUTO: 37.7 % (ref 37–47)
HDLC SERPL-MCNC: 56 MG/DL (ref 35–60)
HGB BLD-MCNC: 12.2 G/DL (ref 12–16)
HGB UR QL STRIP: NEGATIVE
HYALINE CASTS #/AREA URNS LPF: ABNORMAL /LPF
IMM GRANULOCYTES # BLD AUTO: 0.01 X10(3)/MCL (ref 0–0.04)
IMM GRANULOCYTES NFR BLD AUTO: 0.1 %
KETONES UR QL STRIP: NEGATIVE
LDLC SERPL CALC-MCNC: 71 MG/DL (ref 50–140)
LEUKOCYTE ESTERASE UR QL STRIP: 25
LYMPHOCYTES # BLD AUTO: 3.93 X10(3)/MCL (ref 0.6–4.6)
LYMPHOCYTES NFR BLD AUTO: 40.9 %
MCH RBC QN AUTO: 29.6 PG (ref 27–31)
MCHC RBC AUTO-ENTMCNC: 32.4 G/DL (ref 33–36)
MCV RBC AUTO: 91.5 FL (ref 80–94)
MICROALBUMIN UR-MCNC: 22.3 UG/ML
MICROALBUMIN/CREAT RATIO PNL UR: 10.6 MG/GM CR (ref 0–30)
MONOCYTES # BLD AUTO: 0.61 X10(3)/MCL (ref 0.1–1.3)
MONOCYTES NFR BLD AUTO: 6.3 %
MUCOUS THREADS URNS QL MICRO: ABNORMAL /LPF
NEUTROPHILS # BLD AUTO: 5 X10(3)/MCL (ref 2.1–9.2)
NEUTROPHILS NFR BLD AUTO: 52.1 %
NITRITE UR QL STRIP: NEGATIVE
NRBC BLD AUTO-RTO: 0 %
PH UR STRIP: 6 [PH]
PLATELET # BLD AUTO: 164 X10(3)/MCL (ref 130–400)
PMV BLD AUTO: 10.9 FL (ref 7.4–10.4)
POTASSIUM SERPL-SCNC: 3.2 MMOL/L (ref 3.5–5.1)
PROT SERPL-MCNC: 6.4 GM/DL (ref 6.4–8.3)
PROT UR QL STRIP: ABNORMAL
RBC # BLD AUTO: 4.12 X10(6)/MCL (ref 4.2–5.4)
RBC #/AREA URNS AUTO: ABNORMAL /HPF
SODIUM SERPL-SCNC: 141 MMOL/L (ref 136–145)
SP GR UR STRIP.AUTO: 1.03 (ref 1–1.03)
SQUAMOUS #/AREA URNS LPF: ABNORMAL /HPF
T4 FREE SERPL-MCNC: 0.93 NG/DL (ref 0.7–1.48)
TRIGL SERPL-MCNC: 105 MG/DL (ref 37–140)
TSH SERPL-ACNC: 2.26 UIU/ML (ref 0.35–4.94)
UROBILINOGEN UR STRIP-ACNC: NORMAL
VLDLC SERPL CALC-MCNC: 21 MG/DL
WBC # SPEC AUTO: 9.61 X10(3)/MCL (ref 4.5–11.5)
WBC #/AREA URNS AUTO: ABNORMAL /HPF

## 2024-06-11 PROCEDURE — 3044F HG A1C LEVEL LT 7.0%: CPT | Mod: CPTII,,, | Performed by: STUDENT IN AN ORGANIZED HEALTH CARE EDUCATION/TRAINING PROGRAM

## 2024-06-11 PROCEDURE — 3061F NEG MICROALBUMINURIA REV: CPT | Mod: CPTII,,, | Performed by: STUDENT IN AN ORGANIZED HEALTH CARE EDUCATION/TRAINING PROGRAM

## 2024-06-11 PROCEDURE — 3077F SYST BP >= 140 MM HG: CPT | Mod: CPTII,,, | Performed by: STUDENT IN AN ORGANIZED HEALTH CARE EDUCATION/TRAINING PROGRAM

## 2024-06-11 PROCEDURE — 82570 ASSAY OF URINE CREATININE: CPT | Performed by: STUDENT IN AN ORGANIZED HEALTH CARE EDUCATION/TRAINING PROGRAM

## 2024-06-11 PROCEDURE — 99395 PREV VISIT EST AGE 18-39: CPT | Mod: S$PBB,,, | Performed by: STUDENT IN AN ORGANIZED HEALTH CARE EDUCATION/TRAINING PROGRAM

## 2024-06-11 PROCEDURE — 87086 URINE CULTURE/COLONY COUNT: CPT | Performed by: STUDENT IN AN ORGANIZED HEALTH CARE EDUCATION/TRAINING PROGRAM

## 2024-06-11 PROCEDURE — 80061 LIPID PANEL: CPT | Performed by: STUDENT IN AN ORGANIZED HEALTH CARE EDUCATION/TRAINING PROGRAM

## 2024-06-11 PROCEDURE — 85025 COMPLETE CBC W/AUTO DIFF WBC: CPT | Performed by: STUDENT IN AN ORGANIZED HEALTH CARE EDUCATION/TRAINING PROGRAM

## 2024-06-11 PROCEDURE — 3008F BODY MASS INDEX DOCD: CPT | Mod: CPTII,,, | Performed by: STUDENT IN AN ORGANIZED HEALTH CARE EDUCATION/TRAINING PROGRAM

## 2024-06-11 PROCEDURE — 82306 VITAMIN D 25 HYDROXY: CPT | Performed by: STUDENT IN AN ORGANIZED HEALTH CARE EDUCATION/TRAINING PROGRAM

## 2024-06-11 PROCEDURE — 84439 ASSAY OF FREE THYROXINE: CPT | Performed by: STUDENT IN AN ORGANIZED HEALTH CARE EDUCATION/TRAINING PROGRAM

## 2024-06-11 PROCEDURE — 99213 OFFICE O/P EST LOW 20 MIN: CPT | Mod: PBBFAC,PN | Performed by: STUDENT IN AN ORGANIZED HEALTH CARE EDUCATION/TRAINING PROGRAM

## 2024-06-11 PROCEDURE — 81001 URINALYSIS AUTO W/SCOPE: CPT | Performed by: STUDENT IN AN ORGANIZED HEALTH CARE EDUCATION/TRAINING PROGRAM

## 2024-06-11 PROCEDURE — 96372 THER/PROPH/DIAG INJ SC/IM: CPT | Mod: PBBFAC,PN

## 2024-06-11 PROCEDURE — 83036 HEMOGLOBIN GLYCOSYLATED A1C: CPT | Performed by: STUDENT IN AN ORGANIZED HEALTH CARE EDUCATION/TRAINING PROGRAM

## 2024-06-11 PROCEDURE — 3079F DIAST BP 80-89 MM HG: CPT | Mod: CPTII,,, | Performed by: STUDENT IN AN ORGANIZED HEALTH CARE EDUCATION/TRAINING PROGRAM

## 2024-06-11 PROCEDURE — 99214 OFFICE O/P EST MOD 30 MIN: CPT | Mod: S$PBB,25,, | Performed by: STUDENT IN AN ORGANIZED HEALTH CARE EDUCATION/TRAINING PROGRAM

## 2024-06-11 PROCEDURE — 3066F NEPHROPATHY DOC TX: CPT | Mod: CPTII,,, | Performed by: STUDENT IN AN ORGANIZED HEALTH CARE EDUCATION/TRAINING PROGRAM

## 2024-06-11 PROCEDURE — 1159F MED LIST DOCD IN RCRD: CPT | Mod: CPTII,,, | Performed by: STUDENT IN AN ORGANIZED HEALTH CARE EDUCATION/TRAINING PROGRAM

## 2024-06-11 PROCEDURE — 36415 COLL VENOUS BLD VENIPUNCTURE: CPT | Performed by: STUDENT IN AN ORGANIZED HEALTH CARE EDUCATION/TRAINING PROGRAM

## 2024-06-11 PROCEDURE — 80053 COMPREHEN METABOLIC PANEL: CPT | Performed by: STUDENT IN AN ORGANIZED HEALTH CARE EDUCATION/TRAINING PROGRAM

## 2024-06-11 PROCEDURE — 84443 ASSAY THYROID STIM HORMONE: CPT | Performed by: STUDENT IN AN ORGANIZED HEALTH CARE EDUCATION/TRAINING PROGRAM

## 2024-06-11 RX ORDER — DEXAMETHASONE SODIUM PHOSPHATE 4 MG/ML
8 INJECTION, SOLUTION INTRA-ARTICULAR; INTRALESIONAL; INTRAMUSCULAR; INTRAVENOUS; SOFT TISSUE
Status: COMPLETED | OUTPATIENT
Start: 2024-06-11 | End: 2024-06-11

## 2024-06-11 RX ORDER — AMLODIPINE BESYLATE 10 MG/1
10 TABLET ORAL NIGHTLY
Qty: 30 TABLET | Refills: 11 | Status: SHIPPED | OUTPATIENT
Start: 2024-06-11 | End: 2025-06-11

## 2024-06-11 RX ORDER — NIFEDIPINE 30 MG/1
30 TABLET, EXTENDED RELEASE ORAL EVERY MORNING
COMMUNITY
End: 2024-06-11

## 2024-06-11 RX ORDER — GABAPENTIN 300 MG/1
300 CAPSULE ORAL 3 TIMES DAILY
Qty: 90 CAPSULE | Refills: 11 | Status: SHIPPED | OUTPATIENT
Start: 2024-06-11 | End: 2025-06-11

## 2024-06-11 RX ORDER — CYCLOBENZAPRINE HCL 10 MG
10 TABLET ORAL 3 TIMES DAILY PRN
COMMUNITY
Start: 2024-06-09 | End: 2024-06-14

## 2024-06-11 RX ORDER — IBUPROFEN 800 MG/1
800 TABLET ORAL EVERY 6 HOURS PRN
COMMUNITY
Start: 2024-01-12

## 2024-06-11 RX ORDER — DICLOFENAC SODIUM 50 MG/1
50 TABLET, DELAYED RELEASE ORAL 2 TIMES DAILY PRN
COMMUNITY
Start: 2024-06-09 | End: 2024-06-19

## 2024-06-11 RX ADMIN — DEXAMETHASONE SODIUM PHOSPHATE 8 MG: 4 INJECTION, SOLUTION INTRA-ARTICULAR; INTRALESIONAL; INTRAMUSCULAR; INTRAVENOUS; SOFT TISSUE at 02:06

## 2024-06-11 NOTE — PROGRESS NOTES
Patient Name: Savanah Pang     : 1990    MRN: [id number]     Subjective:     Patient ID: Savanah Pagn is a 34 y.o. female.    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   Iron panel performed last visit and iron noted to be within normal limits  Fractionated hemoglobin WNL no sickle cell disease or Thalassemia trait  Retic count is normal    Hypertension  Blood pressure in clinic 143/89 is no longer taking labetalol  ROS:      ROS     12 point review of systems conducted, negative except as stated in the history of present illness. See HPI for details.    History:     Past Medical History:   Diagnosis Date    Hypertension         History reviewed. No pertinent surgical history.    No family history on file.     Social History     Tobacco Use    Smoking status: Never     Passive exposure: Never    Smokeless tobacco: Never   Substance and Sexual Activity    Alcohol use: Yes    Drug use: Never    Sexual activity: Yes     Partners: Male     Birth control/protection: I.U.D.       Current Outpatient Medications   Medication Instructions    amLODIPine (NORVASC) 10 mg, Oral, Nightly    cyclobenzaprine  "(FLEXERIL) 10 mg, Oral, 3 times daily PRN    diclofenac (VOLTAREN) 50 mg, Oral, 2 times daily PRN    gabapentin (NEURONTIN) 300 mg, Oral, 3 times daily    ibuprofen (ADVIL,MOTRIN) 800 mg, Oral, Every 6 hours PRN        Review of patient's allergies indicates:  No Known Allergies    Objective:     Visit Vitals  BP (!) 143/89 (BP Location: Left arm, Patient Position: Sitting, BP Method: Large (Automatic))   Pulse 63   Temp 97.6 °F (36.4 °C) (Oral)   Resp 18   Ht 5' 6" (1.676 m)   Wt 84.8 kg (186 lb 14.4 oz)   LMP 05/29/2024 (Exact Date)   SpO2 100%   BMI 30.17 kg/m²       Physical Examination:     Physical Exam  Constitutional:       General: She is not in acute distress.     Appearance: Normal appearance. She is not ill-appearing or diaphoretic.   HENT:      Nose: No congestion.   Eyes:      Conjunctiva/sclera: Conjunctivae normal.      Pupils: Pupils are equal, round, and reactive to light.   Cardiovascular:      Rate and Rhythm: Normal rate and regular rhythm.      Heart sounds: No murmur heard.  Pulmonary:      Effort: Pulmonary effort is normal. No respiratory distress.      Breath sounds: Normal breath sounds. No wheezing.   Musculoskeletal:         General: No swelling, tenderness, deformity or signs of injury. Normal range of motion.      Cervical back: Normal range of motion.   Skin:     General: Skin is warm and dry.      Coloration: Skin is not jaundiced.   Neurological:      General: No focal deficit present.      Mental Status: She is alert and oriented to person, place, and time. Mental status is at baseline.      Gait: Gait abnormal.         Lab Results:     Chemistry:  Lab Results   Component Value Date     06/11/2024    K 3.2 (L) 06/11/2024    BUN 14.1 06/11/2024    CREATININE 1.05 (H) 06/11/2024    EGFRNORACEVR >60 06/11/2024    GLUCOSE 87 06/11/2024    CALCIUM 9.0 06/11/2024    ALKPHOS 49 06/11/2024    LABPROT 6.4 06/11/2024    ALBUMIN 3.6 06/11/2024    BILIDIR 0.1 09/29/2021    IBILI 0.30 " 09/29/2021    AST 15 06/11/2024    ALT 15 06/11/2024    KPBZLFDG17AE 5 (L) 06/11/2024    TSH 2.264 06/11/2024    FXRRFZ1EIFD 0.93 06/11/2024        Lab Results   Component Value Date    HGBA1C 4.9 06/11/2024        Hematology:  Lab Results   Component Value Date    WBC 9.61 06/11/2024    HGB 12.2 06/11/2024    HCT 37.7 06/11/2024     06/11/2024       Lipid Panel:  Lab Results   Component Value Date    CHOL 148 06/11/2024    HDL 56 06/11/2024    LDL 71.00 06/11/2024    TRIG 105 06/11/2024    TOTALCHOLEST 3 06/11/2024        Urine:  Lab Results   Component Value Date    APPEARANCEUA Clear 06/11/2024    SGUA 1.028 06/11/2024    PROTEINUA Trace (A) 06/11/2024    KETONESUA Negative 06/11/2024    LEUKOCYTESUR 25 (A) 06/11/2024    RBCUA 0-5 06/11/2024    WBCUA 11-20 (A) 06/11/2024    BACTERIA Occ (A) 06/11/2024    SQEPUA Moderate (A) 06/11/2024    HYALINECASTS None Seen 06/11/2024    CREATRANDUR 209.9 (H) 06/11/2024        Assessment:          ICD-10-CM ICD-9-CM   1. Primary hypertension  I10 401.9   2. Sciatic nerve disease, right  G57.01 355.0   3. Sciatic nerve pain, left  M54.32 724.3   4. Wellness examination  Z00.00 V70.0   5. Sciatic nerve pain, right  M54.31 724.3   6. Iron deficiency anemia due to chronic blood loss  D50.0 280.0        Plan:     1. Primary hypertension  Assessment & Plan:  Starting amlodipine 10 mg daily blood pressure elevated at 143/89    Orders:  -     amLODIPine (NORVASC) 10 MG tablet; Take 1 tablet (10 mg total) by mouth every evening.  Dispense: 30 tablet; Refill: 11  -     dexAMETHasone injection 8 mg  -     Microalbumin/Creatinine Ratio, Urine; Future; Expected date: 06/11/2024    2. Sciatic nerve disease, right  -     gabapentin (NEURONTIN) 300 MG capsule; Take 1 capsule (300 mg total) by mouth 3 (three) times daily.  Dispense: 90 capsule; Refill: 11  -     dexAMETHasone injection 8 mg    3. Sciatic nerve pain, left  Overview:  Patient is attending physical therapy can not take  gabapentin advised of Tylenol for pain may continue physical therapy    Orders:  -     dexAMETHasone injection 8 mg    4. Wellness examination  Assessment & Plan:  Labs as below    Orders:  -     CBC Auto Differential; Future; Expected date: 06/11/2024  -     Comprehensive Metabolic Panel; Future; Expected date: 06/11/2024  -     Lipid Panel; Future; Expected date: 06/11/2024  -     TSH; Future; Expected date: 06/11/2024  -     Hemoglobin A1C; Future; Expected date: 06/11/2024  -     Urinalysis; Future; Expected date: 06/11/2024  -     T4, Free; Future; Expected date: 06/11/2024  -     Microalbumin/Creatinine Ratio, Urine; Future; Expected date: 06/11/2024  -     Vitamin D; Future; Expected date: 06/11/2024  -     Urine culture    5. Sciatic nerve pain, right  Assessment & Plan:   gabapentin 300 mg t.i.d. may need to titrate   Complete physical therapy sciatic nerve stretches given dexamethasone 8 mg injection      6. Iron deficiency anemia due to chronic blood loss  Assessment & Plan:  CBC B12 iron           Follow up in about 1 month (around 7/11/2024) for Hypertension, BP check.    Future Appointments   Date Time Provider Department Center   7/11/2024  1:20 PM Lamar Thorpe MD [facility]        Lamar Thorpe MD

## 2024-06-12 PROBLEM — M54.31 SCIATIC NERVE PAIN, RIGHT: Status: ACTIVE | Noted: 2024-06-12

## 2024-06-12 PROBLEM — D50.0 IRON DEFICIENCY ANEMIA DUE TO CHRONIC BLOOD LOSS: Status: ACTIVE | Noted: 2024-06-12

## 2024-06-12 PROBLEM — Z00.00 WELLNESS EXAMINATION: Status: ACTIVE | Noted: 2024-06-12

## 2024-06-12 NOTE — ASSESSMENT & PLAN NOTE
gabapentin 300 mg t.i.d. may need to titrate   Complete physical therapy sciatic nerve stretches given dexamethasone 8 mg injection

## 2024-06-13 LAB — BACTERIA UR CULT: NORMAL

## 2024-07-24 ENCOUNTER — OFFICE VISIT (OUTPATIENT)
Dept: FAMILY MEDICINE | Facility: CLINIC | Age: 34
End: 2024-07-24
Payer: MEDICAID

## 2024-07-24 VITALS
RESPIRATION RATE: 18 BRPM | HEIGHT: 66 IN | BODY MASS INDEX: 29.23 KG/M2 | OXYGEN SATURATION: 99 % | DIASTOLIC BLOOD PRESSURE: 87 MMHG | SYSTOLIC BLOOD PRESSURE: 133 MMHG | HEART RATE: 108 BPM | TEMPERATURE: 99 F | WEIGHT: 181.88 LBS

## 2024-07-24 DIAGNOSIS — I10 PRIMARY HYPERTENSION: Primary | ICD-10-CM

## 2024-07-24 DIAGNOSIS — R79.89 LOW VITAMIN D LEVEL: ICD-10-CM

## 2024-07-24 DIAGNOSIS — M54.31 SCIATIC NERVE PAIN, RIGHT: ICD-10-CM

## 2024-07-24 PROCEDURE — 3008F BODY MASS INDEX DOCD: CPT | Mod: CPTII,,, | Performed by: STUDENT IN AN ORGANIZED HEALTH CARE EDUCATION/TRAINING PROGRAM

## 2024-07-24 PROCEDURE — 3075F SYST BP GE 130 - 139MM HG: CPT | Mod: CPTII,,, | Performed by: STUDENT IN AN ORGANIZED HEALTH CARE EDUCATION/TRAINING PROGRAM

## 2024-07-24 PROCEDURE — 99213 OFFICE O/P EST LOW 20 MIN: CPT | Mod: PBBFAC,PN | Performed by: STUDENT IN AN ORGANIZED HEALTH CARE EDUCATION/TRAINING PROGRAM

## 2024-07-24 PROCEDURE — 99214 OFFICE O/P EST MOD 30 MIN: CPT | Mod: S$PBB,,, | Performed by: STUDENT IN AN ORGANIZED HEALTH CARE EDUCATION/TRAINING PROGRAM

## 2024-07-24 PROCEDURE — 3044F HG A1C LEVEL LT 7.0%: CPT | Mod: CPTII,,, | Performed by: STUDENT IN AN ORGANIZED HEALTH CARE EDUCATION/TRAINING PROGRAM

## 2024-07-24 PROCEDURE — 4010F ACE/ARB THERAPY RXD/TAKEN: CPT | Mod: CPTII,,, | Performed by: STUDENT IN AN ORGANIZED HEALTH CARE EDUCATION/TRAINING PROGRAM

## 2024-07-24 PROCEDURE — 3066F NEPHROPATHY DOC TX: CPT | Mod: CPTII,,, | Performed by: STUDENT IN AN ORGANIZED HEALTH CARE EDUCATION/TRAINING PROGRAM

## 2024-07-24 PROCEDURE — 3079F DIAST BP 80-89 MM HG: CPT | Mod: CPTII,,, | Performed by: STUDENT IN AN ORGANIZED HEALTH CARE EDUCATION/TRAINING PROGRAM

## 2024-07-24 PROCEDURE — 3061F NEG MICROALBUMINURIA REV: CPT | Mod: CPTII,,, | Performed by: STUDENT IN AN ORGANIZED HEALTH CARE EDUCATION/TRAINING PROGRAM

## 2024-07-24 RX ORDER — LOSARTAN POTASSIUM 50 MG/1
50 TABLET ORAL DAILY
Qty: 90 TABLET | Refills: 3 | Status: SHIPPED | OUTPATIENT
Start: 2024-07-24 | End: 2025-07-24

## 2024-07-24 RX ORDER — CHOLECALCIFEROL (VITAMIN D3) 1250 MCG
1250 TABLET ORAL
Qty: 12 TABLET | Refills: 3 | Status: SHIPPED | OUTPATIENT
Start: 2024-07-24

## 2024-07-25 PROBLEM — R79.89 LOW VITAMIN D LEVEL: Status: ACTIVE | Noted: 2024-07-25

## 2024-07-25 NOTE — PROGRESS NOTES
Patient Name: Savanah Pang     : 1990    MRN: [id number]     Subjective:     Patient ID: Savanah Pang is a 34 y.o. female.    Chief Complaint:   Chief Complaint   Patient presents with    Follow-up     F/u        HPI: HPI  34 year old presents to clinic for follow up of HTN  Hypertension  At previous visit patient was started on amlodipine but notes that since she has had amlodipine added her headaches have returned and is amenable to starting a new medication  Blood pressure in clinic 133/87  Sciatic nerve pain   At previous visit patient had reported that she had been working a foot press at work and having increased sciatic nerve pain   Reports that since that previous visit she has stopped working the foot press and notes that her sciatic pain has decreased      Low vitamin-D   Noted on labs      Anemia  Patient with H&H of 11.9/36.8 was previously also low  Denies bleeding in urine and UA in ED does not show red blood cells  Denies bleeding gums or bleeding in her feces  Had IUD removed 2022 and is now on OCP.   Iron panel performed last visit and iron noted to be within normal limits  Fractionated hemoglobin WNL no sickle cell disease or Thalassemia trait  Retic count is normal        ROS:    ROS:      ROS     12 point review of systems conducted, negative except as stated in the history of present illness. See HPI for details.    History:     Past Medical History:   Diagnosis Date    Hypertension         No past surgical history on file.    No family history on file.     Social History     Tobacco Use    Smoking status: Never     Passive exposure: Never    Smokeless tobacco: Never   Substance and Sexual Activity    Alcohol use: Yes    Drug use: Never    Sexual activity: Yes     Partners: Male     Birth control/protection: I.U.D.       Current Outpatient Medications   Medication Instructions    cholecalciferol (vitamin D3) 1,250 mcg, Oral, Every 7 days    gabapentin (NEURONTIN) 300 mg, Oral, 3  "times daily    ibuprofen (ADVIL,MOTRIN) 800 mg, Oral, Every 6 hours PRN    losartan (COZAAR) 50 mg, Oral, Daily        Review of patient's allergies indicates:  No Known Allergies    Objective:     Visit Vitals  /87 (Patient Position: Sitting)   Pulse 108   Temp 98.6 °F (37 °C) (Oral)   Resp 18   Ht 5' 6" (1.676 m)   Wt 82.5 kg (181 lb 14.4 oz)   SpO2 99%   BMI 29.36 kg/m²       Physical Examination:     Physical Exam  Constitutional:       General: She is not in acute distress.     Appearance: Normal appearance. She is not ill-appearing or diaphoretic.   HENT:      Nose: No congestion.   Eyes:      Conjunctiva/sclera: Conjunctivae normal.      Pupils: Pupils are equal, round, and reactive to light.   Cardiovascular:      Rate and Rhythm: Normal rate and regular rhythm.      Heart sounds: No murmur heard.  Pulmonary:      Effort: Pulmonary effort is normal. No respiratory distress.      Breath sounds: Normal breath sounds. No wheezing.   Musculoskeletal:         General: No swelling, tenderness, deformity or signs of injury. Normal range of motion.      Cervical back: Normal range of motion.   Skin:     General: Skin is warm and dry.      Coloration: Skin is not jaundiced.   Neurological:      General: No focal deficit present.      Mental Status: She is alert and oriented to person, place, and time. Mental status is at baseline.      Gait: Gait normal.         Lab Results:     Chemistry:  Lab Results   Component Value Date     06/11/2024    K 3.2 (L) 06/11/2024    BUN 14.1 06/11/2024    CREATININE 1.05 (H) 06/11/2024    EGFRNORACEVR >60 06/11/2024    GLUCOSE 87 06/11/2024    CALCIUM 9.0 06/11/2024    ALKPHOS 49 06/11/2024    LABPROT 6.4 06/11/2024    ALBUMIN 3.6 06/11/2024    BILIDIR 0.1 09/29/2021    IBILI 0.30 09/29/2021    AST 15 06/11/2024    ALT 15 06/11/2024    [id number] 5 (L) 06/11/2024    TSH 2.264 06/11/2024    NEBQAU9MDHY 0.93 06/11/2024        Lab Results   Component Value Date    HGBA1C " 4.9 06/11/2024        Hematology:  Lab Results   Component Value Date    WBC 9.61 06/11/2024    HGB 12.2 06/11/2024    HCT 37.7 06/11/2024     06/11/2024       Lipid Panel:  Lab Results   Component Value Date    CHOL 148 06/11/2024    HDL 56 06/11/2024    LDL 71.00 06/11/2024    TRIG 105 06/11/2024    TOTALCHOLEST 3 06/11/2024        Urine:  Lab Results   Component Value Date    APPEARANCEUA Clear 06/11/2024    SGUA 1.028 06/11/2024    PROTEINUA Trace (A) 06/11/2024    KETONESUA Negative 06/11/2024    LEUKOCYTESUR 25 (A) 06/11/2024    RBCUA 0-5 06/11/2024    WBCUA 11-20 (A) 06/11/2024    BACTERIA Occ (A) 06/11/2024    SQEPUA Moderate (A) 06/11/2024    HYALINECASTS None Seen 06/11/2024    CREATRANDUR 209.9 (H) 06/11/2024        Assessment:          ICD-10-CM ICD-9-CM   1. Primary hypertension  I10 401.9   2. Low vitamin D level  R79.89 790.6   3. Sciatic nerve pain, right  M54.31 724.3        Plan:     1. Primary hypertension  Overview:  Amlodipine caused headaches    Assessment & Plan:  Adding losartan 50 mg daily  Will need to test BMP in 1 month    Orders:  -     losartan (COZAAR) 50 MG tablet; Take 1 tablet (50 mg total) by mouth once daily.  Dispense: 90 tablet; Refill: 3    2. Low vitamin D level  Assessment & Plan:  Vitamin-D 350 1000 units q.week    Orders:  -     cholecalciferol, vitamin D3, 1,250 mcg (50,000 unit) Tab; Take 1,250 mcg by mouth every 7 days.  Dispense: 12 tablet; Refill: 3    3. Sciatic nerve pain, right  Assessment & Plan:  Sciatic nerve pain has improved           Follow up in about 1 month (around 8/24/2024) for Hypertension with BMP.    Future Appointments   Date Time Provider Department Center   9/10/2024  9:20 AM [facility]        Lamar Thorpe MD

## 2024-08-23 ENCOUNTER — TELEPHONE (OUTPATIENT)
Dept: GYNECOLOGY | Facility: CLINIC | Age: 34
End: 2024-08-23
Payer: MEDICAID

## 2024-08-23 NOTE — TELEPHONE ENCOUNTER
try to call the patient about their upcoming appt with Zora Garnica NP but not able to speak with the patient, vm not setup. 08/23/2024@[id number]

## 2024-09-10 ENCOUNTER — OFFICE VISIT (OUTPATIENT)
Dept: FAMILY MEDICINE | Facility: CLINIC | Age: 34
End: 2024-09-10
Payer: MEDICAID

## 2024-09-10 ENCOUNTER — PATIENT MESSAGE (OUTPATIENT)
Dept: FAMILY MEDICINE | Facility: CLINIC | Age: 34
End: 2024-09-10

## 2024-09-10 VITALS
DIASTOLIC BLOOD PRESSURE: 111 MMHG | WEIGHT: 183 LBS | BODY MASS INDEX: 29.41 KG/M2 | OXYGEN SATURATION: 98 % | TEMPERATURE: 97 F | HEART RATE: 82 BPM | HEIGHT: 66 IN | SYSTOLIC BLOOD PRESSURE: 154 MMHG

## 2024-09-10 DIAGNOSIS — I10 PRIMARY HYPERTENSION: Primary | ICD-10-CM

## 2024-09-10 DIAGNOSIS — E87.6 HYPOKALEMIA: Primary | ICD-10-CM

## 2024-09-10 LAB
ANION GAP SERPL CALC-SCNC: 8 MEQ/L
BUN SERPL-MCNC: 8.8 MG/DL (ref 7–18.7)
CALCIUM SERPL-MCNC: 9.7 MG/DL (ref 8.4–10.2)
CHLORIDE SERPL-SCNC: 103 MMOL/L (ref 98–107)
CO2 SERPL-SCNC: 27 MMOL/L (ref 22–29)
CREAT SERPL-MCNC: 0.82 MG/DL (ref 0.55–1.02)
CREAT/UREA NIT SERPL: 11
GFR SERPLBLD CREATININE-BSD FMLA CKD-EPI: >60 ML/MIN/1.73/M2
GLUCOSE SERPL-MCNC: 96 MG/DL (ref 74–100)
POTASSIUM SERPL-SCNC: 3.4 MMOL/L (ref 3.5–5.1)
SODIUM SERPL-SCNC: 138 MMOL/L (ref 136–145)

## 2024-09-10 PROCEDURE — 3066F NEPHROPATHY DOC TX: CPT | Mod: CPTII,,, | Performed by: STUDENT IN AN ORGANIZED HEALTH CARE EDUCATION/TRAINING PROGRAM

## 2024-09-10 PROCEDURE — 3077F SYST BP >= 140 MM HG: CPT | Mod: CPTII,,, | Performed by: STUDENT IN AN ORGANIZED HEALTH CARE EDUCATION/TRAINING PROGRAM

## 2024-09-10 PROCEDURE — 99214 OFFICE O/P EST MOD 30 MIN: CPT | Mod: S$PBB,,, | Performed by: STUDENT IN AN ORGANIZED HEALTH CARE EDUCATION/TRAINING PROGRAM

## 2024-09-10 PROCEDURE — 36415 COLL VENOUS BLD VENIPUNCTURE: CPT | Performed by: STUDENT IN AN ORGANIZED HEALTH CARE EDUCATION/TRAINING PROGRAM

## 2024-09-10 PROCEDURE — 99213 OFFICE O/P EST LOW 20 MIN: CPT | Mod: PBBFAC,PN | Performed by: STUDENT IN AN ORGANIZED HEALTH CARE EDUCATION/TRAINING PROGRAM

## 2024-09-10 PROCEDURE — 3080F DIAST BP >= 90 MM HG: CPT | Mod: CPTII,,, | Performed by: STUDENT IN AN ORGANIZED HEALTH CARE EDUCATION/TRAINING PROGRAM

## 2024-09-10 PROCEDURE — 3061F NEG MICROALBUMINURIA REV: CPT | Mod: CPTII,,, | Performed by: STUDENT IN AN ORGANIZED HEALTH CARE EDUCATION/TRAINING PROGRAM

## 2024-09-10 PROCEDURE — 80048 BASIC METABOLIC PNL TOTAL CA: CPT | Performed by: STUDENT IN AN ORGANIZED HEALTH CARE EDUCATION/TRAINING PROGRAM

## 2024-09-10 PROCEDURE — 3008F BODY MASS INDEX DOCD: CPT | Mod: CPTII,,, | Performed by: STUDENT IN AN ORGANIZED HEALTH CARE EDUCATION/TRAINING PROGRAM

## 2024-09-10 PROCEDURE — 3044F HG A1C LEVEL LT 7.0%: CPT | Mod: CPTII,,, | Performed by: STUDENT IN AN ORGANIZED HEALTH CARE EDUCATION/TRAINING PROGRAM

## 2024-09-10 PROCEDURE — 4010F ACE/ARB THERAPY RXD/TAKEN: CPT | Mod: CPTII,,, | Performed by: STUDENT IN AN ORGANIZED HEALTH CARE EDUCATION/TRAINING PROGRAM

## 2024-09-10 PROCEDURE — 1159F MED LIST DOCD IN RCRD: CPT | Mod: CPTII,,, | Performed by: STUDENT IN AN ORGANIZED HEALTH CARE EDUCATION/TRAINING PROGRAM

## 2024-09-10 RX ORDER — LOSARTAN POTASSIUM AND HYDROCHLOROTHIAZIDE 12.5; 5 MG/1; MG/1
2 TABLET ORAL DAILY
Qty: 180 TABLET | Refills: 3 | Status: SHIPPED | OUTPATIENT
Start: 2024-09-10 | End: 2025-09-10

## 2024-09-10 RX ORDER — POTASSIUM CHLORIDE 20 MEQ/1
20 TABLET, EXTENDED RELEASE ORAL DAILY
Qty: 4 TABLET | Refills: 0 | Status: SHIPPED | OUTPATIENT
Start: 2024-09-10 | End: 2024-09-14

## 2024-09-10 NOTE — ASSESSMENT & PLAN NOTE
Blood pressure still remains very elevated 154/111 patient denies any symptoms or complaints   Stopping losartan and starting losartan hydrochlorothiazide 50/12.5 with instructions for patient to take 1 tablet for the next 3 days and monitor her blood pressure if blood pressure remains greater than 140/90 patient is to increase to 2 tablets daily  BNP today to assess potassium and kidney function

## 2024-09-10 NOTE — PROGRESS NOTES
Patient Name: Savanah Pang     : 1990    MRN: [id number]     Subjective:     Patient ID: Savanah Pang is a 34 y.o. female.    Chief Complaint:   Chief Complaint   Patient presents with    Follow-up     Patient here for blood pressure follow up. She states that she has a pap scheduled for next month. 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   Iron panel performed last visit and iron noted to be within normal limits  Fractionated hemoglobin WNL no sickle cell disease or Thalassemia trait  Retic count is normal      ROS:      ROS     12 point review of systems conducted, negative except as stated in the history of present illness. See HPI for details.    History:     Past Medical History:   Diagnosis Date    Hypertension         History reviewed. No pertinent surgical history.    No family history on file.     Social History     Tobacco Use    Smoking status: Never     Passive exposure: Never    Smokeless tobacco: Never   Substance and Sexual Activity    Alcohol use: Yes    Drug use: Never    Sexual activity: Yes     Partners:  "Male     Birth control/protection: I.U.D.       Current Outpatient Medications   Medication Instructions    cholecalciferol (vitamin D3) 1,250 mcg, Oral, Every 7 days    gabapentin (NEURONTIN) 300 mg, Oral, 3 times daily    ibuprofen (ADVIL,MOTRIN) 800 mg, Oral, Every 6 hours PRN    losartan-hydrochlorothiazide 50-12.5 mg (HYZAAR) 50-12.5 mg per tablet 2 tablets, Oral, Daily, Start with one tablet for first 3 days. If still greater than 140/90 take two tablets        Review of patient's allergies indicates:  No Known Allergies    Objective:     Visit Vitals  BP (!) 154/111 (BP Location: Right arm, Patient Position: Sitting)   Pulse 82   Temp 97.4 °F (36.3 °C)   Ht 5' 6" (1.676 m)   Wt 83 kg (183 lb)   LMP 08/25/2024   SpO2 98%   BMI 29.54 kg/m²       Physical Examination:     Physical Exam  Constitutional:       General: She is not in acute distress.     Appearance: Normal appearance. She is not ill-appearing or diaphoretic.   HENT:      Nose: No congestion.   Eyes:      Conjunctiva/sclera: Conjunctivae normal.      Pupils: Pupils are equal, round, and reactive to light.   Cardiovascular:      Rate and Rhythm: Normal rate and regular rhythm.      Heart sounds: No murmur heard.  Pulmonary:      Effort: Pulmonary effort is normal. No respiratory distress.      Breath sounds: Normal breath sounds. No wheezing.   Musculoskeletal:         General: No swelling, tenderness, deformity or signs of injury. Normal range of motion.      Cervical back: Normal range of motion.   Skin:     General: Skin is warm and dry.      Coloration: Skin is not jaundiced.   Neurological:      General: No focal deficit present.      Mental Status: She is alert and oriented to person, place, and time. Mental status is at baseline.      Gait: Gait normal.         Lab Results:     Chemistry:  Lab Results   Component Value Date     06/11/2024    K 3.2 (L) 06/11/2024    BUN 14.1 06/11/2024    CREATININE 1.05 (H) 06/11/2024    EGFRNORACEVR >60 " 06/11/2024    GLUCOSE 87 06/11/2024    CALCIUM 9.0 06/11/2024    ALKPHOS 49 06/11/2024    LABPROT 6.4 06/11/2024    ALBUMIN 3.6 06/11/2024    BILIDIR 0.1 09/29/2021    IBILI 0.30 09/29/2021    AST 15 06/11/2024    ALT 15 06/11/2024    QMNPDWYG43YC 5 (L) 06/11/2024    TSH 2.264 06/11/2024    GHTUDL1AOCU 0.93 06/11/2024        Lab Results   Component Value Date    HGBA1C 4.9 06/11/2024        Hematology:  Lab Results   Component Value Date    WBC 9.61 06/11/2024    HGB 12.2 06/11/2024    HCT 37.7 06/11/2024     06/11/2024       Lipid Panel:  Lab Results   Component Value Date    CHOL 148 06/11/2024    HDL 56 06/11/2024    LDL 71.00 06/11/2024    TRIG 105 06/11/2024    TOTALCHOLEST 3 06/11/2024        Urine:  Lab Results   Component Value Date    APPEARANCEUA Clear 06/11/2024    SGUA 1.028 06/11/2024    PROTEINUA Trace (A) 06/11/2024    KETONESUA Negative 06/11/2024    LEUKOCYTESUR 25 (A) 06/11/2024    RBCUA 0-5 06/11/2024    WBCUA 11-20 (A) 06/11/2024    BACTERIA Occ (A) 06/11/2024    SQEPUA Moderate (A) 06/11/2024    HYALINECASTS None Seen 06/11/2024    CREATRANDUR 209.9 (H) 06/11/2024        Assessment:          ICD-10-CM ICD-9-CM   1. Primary hypertension  I10 401.9        Plan:     1. Primary hypertension  Overview:  Amlodipine caused headaches    Assessment & Plan:  Blood pressure still remains very elevated 154/111 patient denies any symptoms or complaints   Stopping losartan and starting losartan hydrochlorothiazide 50/12.5 with instructions for patient to take 1 tablet for the next 3 days and monitor her blood pressure if blood pressure remains greater than 140/90 patient is to increase to 2 tablets daily  BNP today to assess potassium and kidney function    Orders:  -     losartan-hydrochlorothiazide 50-12.5 mg (HYZAAR) 50-12.5 mg per tablet; Take 2 tablets by mouth once daily. Start with one tablet for first 3 days. If still greater than 140/90 take two tablets  Dispense: 180 tablet; Refill: 3  -      Basic Metabolic Panel; Future; Expected date: 09/10/2024         Follow up in about 1 month (around 10/10/2024) for Hypertension, BP check.    Future Appointments   Date Time Provider Department Center   10/8/2024  8:50 AM Zora Garnica FNP [facility] GYN An[place] Kumar Thorpe MD

## 2024-09-13 ENCOUNTER — TELEPHONE (OUTPATIENT)
Dept: GYNECOLOGY | Facility: CLINIC | Age: 34
End: 2024-09-13
Payer: MEDICAID

## 2024-09-13 NOTE — TELEPHONE ENCOUNTER
"per pt conversation had my pap smear 04/2023 before I had my baby and I am due for another pap smear. Patient states" I have been release for my OB/GYN provider. Requesting to see the New Provider per pt. pt cycle started on 08/25/2024 and lasted 4 days.   "

## 2024-09-16 ENCOUNTER — OFFICE VISIT (OUTPATIENT)
Dept: GYNECOLOGY | Facility: CLINIC | Age: 34
End: 2024-09-16
Payer: MEDICAID

## 2024-09-16 VITALS
SYSTOLIC BLOOD PRESSURE: 138 MMHG | WEIGHT: 183.19 LBS | DIASTOLIC BLOOD PRESSURE: 80 MMHG | BODY MASS INDEX: 29.44 KG/M2 | TEMPERATURE: 99 F | HEIGHT: 66 IN | HEART RATE: 88 BPM | RESPIRATION RATE: 18 BRPM | OXYGEN SATURATION: 100 %

## 2024-09-16 DIAGNOSIS — Z12.4 ENCOUNTER FOR PAPANICOLAOU SMEAR FOR CERVICAL CANCER SCREENING: Primary | ICD-10-CM

## 2024-09-16 PROBLEM — Z00.00 WELLNESS EXAMINATION: Status: RESOLVED | Noted: 2024-06-12 | Resolved: 2024-09-16

## 2024-09-16 PROCEDURE — 3066F NEPHROPATHY DOC TX: CPT | Mod: CPTII,,,

## 2024-09-16 PROCEDURE — 4010F ACE/ARB THERAPY RXD/TAKEN: CPT | Mod: CPTII,,,

## 2024-09-16 PROCEDURE — 99395 PREV VISIT EST AGE 18-39: CPT | Mod: S$PBB,,,

## 2024-09-16 PROCEDURE — 1159F MED LIST DOCD IN RCRD: CPT | Mod: CPTII,,,

## 2024-09-16 PROCEDURE — 87624 HPV HI-RISK TYP POOLED RSLT: CPT

## 2024-09-16 PROCEDURE — 3075F SYST BP GE 130 - 139MM HG: CPT | Mod: CPTII,,,

## 2024-09-16 PROCEDURE — 99214 OFFICE O/P EST MOD 30 MIN: CPT | Mod: PBBFAC

## 2024-09-16 PROCEDURE — 3044F HG A1C LEVEL LT 7.0%: CPT | Mod: CPTII,,,

## 2024-09-16 PROCEDURE — 3061F NEG MICROALBUMINURIA REV: CPT | Mod: CPTII,,,

## 2024-09-16 PROCEDURE — 3079F DIAST BP 80-89 MM HG: CPT | Mod: CPTII,,,

## 2024-09-16 PROCEDURE — 88174 CYTOPATH C/V AUTO IN FLUID: CPT

## 2024-09-16 PROCEDURE — 3008F BODY MASS INDEX DOCD: CPT | Mod: CPTII,,,

## 2024-09-16 NOTE — PROGRESS NOTES
[facility] -  Gynecology / Women's Health Clinic     Subjective:      Patient ID: Savanah Pang is a 34 y.o. female.    Chief Complaint:  Gynecologic Exam      History of Present Illness:  The patient  here for annual exam. Her LMP was 24. Period last 4 days and changes pads 3-4x/day, cycles manageable/monthly. Denies history of abnormal paps. Denies breast or urinary complaints. Denies pelvic pain, abnormal bleeding or discharge. Pt reports no STIs in the past and no concerns. Declined HPV vaccine. Declines contraception today, interested in contraception but will call clinic when decide. Hx of OCP use and IUD removal . Denies tobacco use. Denies fly hx of breast, ovarian, uterine or colon cancer.     GYN & OB History:  Patient's last menstrual period was 2024 (approximate).   Date of Last Pap: 1/3/2018    OB History    Para Term  AB Living   5 5 5         SAB IAB Ectopic Multiple Live Births                  # Outcome Date GA Lbr Corby/2nd Weight Sex Type Anes PTL Lv   5 Term      Vag-Spont      4 Term      Vag-Spont      3 Term      Vag-Spont      2 Term      Vag-Spont      1 Term      Vag-Spont          Past Medical History:   Diagnosis Date    Hypertension         History reviewed. No pertinent surgical history.     Social History     Tobacco Use    Smoking status: Never     Passive exposure: Never    Smokeless tobacco: Never   Substance and Sexual Activity    Alcohol use: Yes    Drug use: Never    Sexual activity: Yes     Partners: Male     Birth control/protection: I.U.D.        Current Outpatient Medications   Medication Instructions    cholecalciferol (vitamin D3) 1,250 mcg, Oral, Every 7 days    gabapentin (NEURONTIN) 300 mg, Oral, 3 times daily    ibuprofen (ADVIL,MOTRIN) 800 mg, Oral, Every 6 hours PRN    losartan-hydrochlorothiazide 50-12.5 mg (HYZAAR) 50-12.5 mg per tablet 2 tablets, Oral, Daily, Start with one tablet for first 3 days. If still  "greater than 140/90 take two tablets       Review of patient's allergies indicates:  No Known Allergies      Review of Systems:  Review of Systems  Negative except for pertinent findings for positives per HPI.     Objective:     Physical Exam   Visit Vitals  /80   Pulse 88   Temp 98.7 °F (37.1 °C) (Oral)   Resp 18   Ht 5' 6" (1.676 m)   Wt 83.1 kg (183 lb 3.2 oz)   LMP 08/25/2024 (Approximate)   SpO2 100%   BMI 29.57 kg/m²       GENERAL: Well-developed female. No acute distress.    SKIN: Normal to inspection, warm and intact.  BREASTS: No rashes or erythema. No masses, lumps, discharge, tenderness.  VULVA: General appearance normal; external genitalia with no lesions or erythema.  VAGINA: Mucosa/vaginal vault pink, no abnormal discharge or lesions.  CERVIX: Pink, parous appearing os, no erythema or abnormal discharge.  BIMANUAL EXAM: reveals a 8 week-sized uterus. The uterus is non tender. Bilateral adnexa reveal no tenderness.  PSYCHIATRIC: Patient is oriented to person, place, and time. Mood and affect are normal.    Assessment:       ICD-10-CM ICD-9-CM   1. Encounter for Papanicolaou smear for cervical cancer screening  Z12.4 V76.2       Plan:     1. Encounter for Papanicolaou smear for cervical cancer screening  -     Liquid-Based Pap Smear, Screening    Pap today    Reviewed the risks and benefits of the following contraceptive agents:  Barrier protection (need to use with every use, protection against STDs);  Combined hormonal methods of contraception including pills, patch and ring with use daily, weekly and monthly use  Progesterone only methods including pills and depo injection (risk of irregular bleeding, possible 5# weight gain in first year with Depo)  LARC including implants (Nexplanon) 3 years of protection against conception (risk of irregular bleeding for 3-6 months) and IUDs (Shantal, Kyleena, Mirena and Paragard with their use for 3, 5 and 10 years). Shantal, Kyleena and Mirena IUD has " progesterone and many patients have decrease in their cycles, some with amenorrhea and may also have irregular bleeding for first 3-6 months. Paralaury has no hormones, cycles continue and may be heavier.  Spoke to patient about options, patient interested but declined today. Use of OCP and IUD hx, Hx of HTN. Pt reported will call clinic when ready to start contraception options.    Call with any GYN concerns  Follow up in about 1 year (around 9/16/2025) for Annual.

## 2024-09-19 LAB
HIGH RISK HPV: NEGATIVE
PSYCHE PATHOLOGY RESULT: NORMAL

## 2024-12-27 ENCOUNTER — TELEPHONE (OUTPATIENT)
Dept: FAMILY MEDICINE | Facility: CLINIC | Age: 34
End: 2024-12-27
Payer: MEDICAID

## 2024-12-27 NOTE — TELEPHONE ENCOUNTER
----- Message from Estrella sent at 12/27/2024  8:43 AM CST -----  Regarding: pregnant pt , change of meds  Pt would like to change her bp meds , she stated that she is pregnant and dr krishna has done this for her before in a previous pregnancy. We told pt that she doesn't see pregnant women and would have to talk to her obgyn but pt demanded to be seen by dr krishna

## 2024-12-30 DIAGNOSIS — I10 PRIMARY HYPERTENSION: Primary | ICD-10-CM

## 2024-12-30 RX ORDER — NIFEDIPINE 30 MG/1
30 TABLET, EXTENDED RELEASE ORAL DAILY
Qty: 30 TABLET | Refills: 2 | Status: SHIPPED | OUTPATIENT
Start: 2024-12-30 | End: 2025-12-30

## 2025-01-06 ENCOUNTER — TELEPHONE (OUTPATIENT)
Dept: GYNECOLOGY | Facility: CLINIC | Age: 35
End: 2025-01-06
Payer: MEDICAID

## 2025-01-06 NOTE — TELEPHONE ENCOUNTER
Calling patient to inform about her appointment on 1/9/2025 at 12:00 for pregnancy confirmation. Patient did not answer at this time. LVM.

## 2025-01-09 ENCOUNTER — CLINICAL SUPPORT (OUTPATIENT)
Dept: GYNECOLOGY | Facility: CLINIC | Age: 35
End: 2025-01-09
Payer: MEDICAID

## 2025-01-09 DIAGNOSIS — Z32.00 POSSIBLE PREGNANCY, NOT YET CONFIRMED: Primary | ICD-10-CM

## 2025-01-09 LAB
B-HCG UR QL: POSITIVE
CTP QC/QA: YES

## 2025-01-09 PROCEDURE — 81025 URINE PREGNANCY TEST: CPT | Mod: PBBFAC

## 2025-02-13 ENCOUNTER — OFFICE VISIT (OUTPATIENT)
Dept: FAMILY MEDICINE | Facility: CLINIC | Age: 35
End: 2025-02-13
Payer: MEDICAID

## 2025-02-13 ENCOUNTER — HOSPITAL ENCOUNTER (OUTPATIENT)
Dept: RADIOLOGY | Facility: HOSPITAL | Age: 35
Discharge: HOME | End: 2025-02-13
Attending: OBSTETRICS & GYNECOLOGY
Payer: MEDICAID

## 2025-02-13 VITALS
HEART RATE: 84 BPM | SYSTOLIC BLOOD PRESSURE: 152 MMHG | HEIGHT: 66 IN | TEMPERATURE: 98 F | OXYGEN SATURATION: 100 % | BODY MASS INDEX: 30.22 KG/M2 | WEIGHT: 188 LBS | DIASTOLIC BLOOD PRESSURE: 112 MMHG

## 2025-02-13 DIAGNOSIS — Z3A.16 16 WEEKS GESTATION OF PREGNANCY: Primary | ICD-10-CM

## 2025-02-13 DIAGNOSIS — Z34.90 PREGNANCY: ICD-10-CM

## 2025-02-13 DIAGNOSIS — O10.919 CHRONIC HYPERTENSION AFFECTING PREGNANCY: ICD-10-CM

## 2025-02-13 LAB
ALBUMIN SERPL-MCNC: 3.5 G/DL (ref 3.5–5)
ALBUMIN/GLOB SERPL: 0.9 RATIO (ref 1.1–2)
ALP SERPL-CCNC: 54 UNIT/L (ref 40–150)
ALT SERPL-CCNC: 8 UNIT/L (ref 0–55)
ANION GAP SERPL CALC-SCNC: 6 MEQ/L
AST SERPL-CCNC: 13 UNIT/L (ref 5–34)
BACTERIA #/AREA URNS AUTO: ABNORMAL /HPF
BASOPHILS # BLD AUTO: 0.02 X10(3)/MCL
BASOPHILS NFR BLD AUTO: 0.2 %
BILIRUB SERPL-MCNC: 0.4 MG/DL
BILIRUB SERPL-MCNC: NORMAL MG/DL
BILIRUB UR QL STRIP.AUTO: NEGATIVE
BLOOD URINE, POC: NORMAL
BUN SERPL-MCNC: 5.8 MG/DL (ref 7–18.7)
C TRACH DNA SPEC QL NAA+PROBE: NOT DETECTED
CALCIUM SERPL-MCNC: 9.4 MG/DL (ref 8.4–10.2)
CHLORIDE SERPL-SCNC: 104 MMOL/L (ref 98–107)
CLARITY UR: CLEAR
CLARITY, POC UA: CLEAR
CO2 SERPL-SCNC: 25 MMOL/L (ref 22–29)
COLOR UR AUTO: COLORLESS
COLOR, POC UA: YELLOW
CREAT SERPL-MCNC: 0.66 MG/DL (ref 0.55–1.02)
CREAT UR-MCNC: 65.3 MG/DL (ref 45–106)
CREAT/UREA NIT SERPL: 9
EOSINOPHIL # BLD AUTO: 0.04 X10(3)/MCL (ref 0–0.9)
EOSINOPHIL NFR BLD AUTO: 0.4 %
ERYTHROCYTE [DISTWIDTH] IN BLOOD BY AUTOMATED COUNT: 11.6 % (ref 11.5–17)
GFR SERPLBLD CREATININE-BSD FMLA CKD-EPI: >60 ML/MIN/1.73/M2
GLOBULIN SER-MCNC: 3.7 GM/DL (ref 2.4–3.5)
GLUCOSE SERPL-MCNC: 71 MG/DL (ref 74–100)
GLUCOSE UR QL STRIP: NORMAL
GLUCOSE UR QL STRIP: NORMAL
GROUP & RH: ABNORMAL
HBV SURFACE AG SERPL QL IA: NONREACTIVE
HCT VFR BLD AUTO: 36.7 % (ref 37–47)
HCV AB SERPL QL IA: NONREACTIVE
HGB BLD-MCNC: 12.7 G/DL (ref 12–16)
HGB UR QL STRIP: NEGATIVE
HIV 1+2 AB+HIV1 P24 AG SERPL QL IA: NONREACTIVE
HYALINE CASTS #/AREA URNS LPF: ABNORMAL /LPF
IMM GRANULOCYTES # BLD AUTO: 0.02 X10(3)/MCL (ref 0–0.04)
IMM GRANULOCYTES NFR BLD AUTO: 0.2 %
INDIRECT COOMBS: ABNORMAL
KETONES UR QL STRIP: 15
KETONES UR QL STRIP: ABNORMAL
LEUKOCYTE ESTERASE UR QL STRIP: NEGATIVE
LEUKOCYTE ESTERASE URINE, POC: NORMAL
LYMPHOCYTES # BLD AUTO: 1.97 X10(3)/MCL (ref 0.6–4.6)
LYMPHOCYTES NFR BLD AUTO: 21.1 %
MCH RBC QN AUTO: 30.1 PG (ref 27–31)
MCHC RBC AUTO-ENTMCNC: 34.6 G/DL (ref 33–36)
MCV RBC AUTO: 87 FL (ref 80–94)
MONOCYTES # BLD AUTO: 0.39 X10(3)/MCL (ref 0.1–1.3)
MONOCYTES NFR BLD AUTO: 4.2 %
MUCOUS THREADS URNS QL MICRO: ABNORMAL /LPF
N GONORRHOEA DNA SPEC QL NAA+PROBE: NOT DETECTED
NEUTROPHILS # BLD AUTO: 6.89 X10(3)/MCL (ref 2.1–9.2)
NEUTROPHILS NFR BLD AUTO: 73.9 %
NITRITE UR QL STRIP: NEGATIVE
NITRITE, POC UA: NORMAL
NRBC BLD AUTO-RTO: 0 %
PH UR STRIP: 7 [PH]
PH, POC UA: 7
PLATELET # BLD AUTO: 149 X10(3)/MCL (ref 130–400)
PMV BLD AUTO: 11.3 FL (ref 7.4–10.4)
POTASSIUM SERPL-SCNC: 3.3 MMOL/L (ref 3.5–5.1)
PROT SERPL-MCNC: 7.2 GM/DL (ref 6.4–8.3)
PROT UR QL STRIP: NEGATIVE
PROT UR STRIP-MCNC: <6.8 MG/DL
PROTEIN, POC: NORMAL
RBC # BLD AUTO: 4.22 X10(6)/MCL (ref 4.2–5.4)
RBC #/AREA URNS AUTO: ABNORMAL /HPF
SODIUM SERPL-SCNC: 135 MMOL/L (ref 136–145)
SOURCE (OHS): NORMAL
SP GR UR STRIP.AUTO: 1.01 (ref 1–1.03)
SPECIFIC GRAVITY, POC UA: 1.02
SPECIMEN OUTDATE: ABNORMAL
SQUAMOUS #/AREA URNS LPF: ABNORMAL /HPF
T PALLIDUM AB SER QL: REACTIVE
UROBILINOGEN UR STRIP-ACNC: NORMAL
UROBILINOGEN, POC UA: 0.2
WBC # BLD AUTO: 9.33 X10(3)/MCL (ref 4.5–11.5)
WBC #/AREA URNS AUTO: ABNORMAL /HPF

## 2025-02-13 PROCEDURE — 87086 URINE CULTURE/COLONY COUNT: CPT

## 2025-02-13 PROCEDURE — 86592 SYPHILIS TEST NON-TREP QUAL: CPT

## 2025-02-13 PROCEDURE — 86870 RBC ANTIBODY IDENTIFICATION: CPT

## 2025-02-13 PROCEDURE — 85660 RBC SICKLE CELL TEST: CPT

## 2025-02-13 PROCEDURE — 99214 OFFICE O/P EST MOD 30 MIN: CPT | Mod: PBBFAC,25

## 2025-02-13 PROCEDURE — 86787 VARICELLA-ZOSTER ANTIBODY: CPT

## 2025-02-13 PROCEDURE — 86850 RBC ANTIBODY SCREEN: CPT

## 2025-02-13 PROCEDURE — 86780 TREPONEMA PALLIDUM: CPT

## 2025-02-13 PROCEDURE — 86803 HEPATITIS C AB TEST: CPT

## 2025-02-13 PROCEDURE — 82570 ASSAY OF URINE CREATININE: CPT

## 2025-02-13 PROCEDURE — 76805 OB US >/= 14 WKS SNGL FETUS: CPT | Mod: TC

## 2025-02-13 PROCEDURE — 88174 CYTOPATH C/V AUTO IN FLUID: CPT

## 2025-02-13 PROCEDURE — 86762 RUBELLA ANTIBODY: CPT

## 2025-02-13 PROCEDURE — 87389 HIV-1 AG W/HIV-1&-2 AB AG IA: CPT

## 2025-02-13 PROCEDURE — 87340 HEPATITIS B SURFACE AG IA: CPT

## 2025-02-13 PROCEDURE — 87591 N.GONORRHOEAE DNA AMP PROB: CPT

## 2025-02-13 PROCEDURE — 81001 URINALYSIS AUTO W/SCOPE: CPT

## 2025-02-13 PROCEDURE — 81511 FTL CGEN ABNOR FOUR ANAL: CPT

## 2025-02-13 PROCEDURE — 36415 COLL VENOUS BLD VENIPUNCTURE: CPT

## 2025-02-13 PROCEDURE — 80053 COMPREHEN METABOLIC PANEL: CPT

## 2025-02-13 PROCEDURE — 85025 COMPLETE CBC W/AUTO DIFF WBC: CPT

## 2025-02-13 RX ORDER — ASPIRIN 81 MG/1
81 TABLET ORAL DAILY
Qty: 30 TABLET | Refills: 2 | Status: SHIPPED | OUTPATIENT
Start: 2025-02-13

## 2025-02-13 RX ORDER — NIFEDIPINE 30 MG/1
60 TABLET, EXTENDED RELEASE ORAL DAILY
Qty: 60 TABLET | Refills: 2 | Status: SHIPPED | OUTPATIENT
Start: 2025-02-13 | End: 2025-05-14

## 2025-02-13 NOTE — PROGRESS NOTES
"[facility] OB OFFICE VISIT NOTE     Primary PCP:     Chief Complaint     Savanah Pang is a 34 y.o. female   at 16w1d by LMP with KARINA 25 presenting to [facility] for initial prenatal visit.    HPI: complains of headache this morning. Has not taken any meds for headache. States took Procardia 30mg this AM.    Relevant PMH:   HTN - on Procardia 30mg qd     Obstetrics History     OB History          6    Para   5    Term   5            AB        Living   5         SAB        IAB        Ectopic        Multiple        Live Births   5                  Gynecology History   - LMP: 10/23/24  - Age at menarche: 12 years  - Menstrual hx: regular, 28-30 day cycles, 3-4 pads/day, 4 days per period  - History of birth control: h/o OCP use and IUD removal   - History of STDs: h/o syphilis diagnosed in . S/p treatment.  - Abnormal PAPs: denies. Last pap smear 24 NIL, neg HRHPV.  - History of prior : none    Medical History    Past Medical History: HTN  Surgical History: none  Family History: denies  Social History: denies EtOH, tobacco, or illicit drug use  Medications: Procardia 30mg qd    Review of Systems   Review of Systems    Antepartum specific   - Fetal movements: not yet  - Vaginal bleeding: no  - Vaginal discharge: no  - Loss of fluid: no  - Contractions: no  - Headaches: mild headache currently  - Vision changes: no  - Edema: no    Vital Signs     Vitals:    25 1123   BP: (!) 152/112   BP Location: Right arm   Patient Position: Sitting   Pulse: 84   Temp: 98 °F (36.7 °C)   TempSrc: Oral   SpO2: 100%   Weight: 85.3 kg (188 lb)   Height: 5' 6" (1.676 m)       Physical Exam   General: in no acute distress  RESP: clear to auscultation bilaterally, non labored  CV: regular rate and rhythm, no murmurs, no edema  ABD: non-tender, BS+  FHTs: 162 bpm via U/S  Fundal height: 15 cm  External genitalia: Normal female genetalia without lesion, discharge or tenderness.  Speculum Exam: " Vaginal vault without discharge, nonodorous, no lesions/masses seen.  Cervical os visualized as closed, no lesions/masses.    Current Medications:      Current Outpatient Medications   Medication Instructions    aspirin (ECOTRIN) 81 mg, Oral, Daily    cholecalciferol (vitamin D3) 1,250 mcg, Oral, Every 7 days    NIFEdipine (PROCARDIA-XL) 60 mg, Oral, Daily    PNV,calcium 72-iron-folic acid (PRENATAL VITAMIN PLUS LOW IRON) 27 mg iron- 1 mg Tab 1 each, Oral, Daily       Labs   Urine dipstick:   Lab Results   Component Value Date    COLORU Yellow 2025    SPECGRAV 1.020 2025    PHUR 7.0 2025    WBCUR neg 2025    NITRITE neg 2025    PROTEINPOC neg 2025    GLUCOSEUR neg 2025    KETONESU 15 2025    UROBILINOGEN 0.2 2025    BILIRUBINPOC neg 2025    RBCUR neg 2025       Initial OB Labs: (Date 25)  - Blood Type and Rh:   - Antibody Screen:   - CBC H/H:   - BUN/Cr:   - HIV:   - RPR:   - GC:   - CT:   - HBsAg:    - HCVAb:   - Rubella:   - Varicella:   - UA & Culture:   - Sickle Cell Screen:    - PAP:   - Influenza vaccine date:    - Previous  (N/A if not applicable):   - BTL desired (N/A if not applicable):   - Aspirin: prescribed today  chronic htn  Age >/= 35 years and Black race    15-20 Weeks Lab: (Date 25)  - Quad Screen:     28 Week Labs: (Date)  - 1H GTT:   - Rhogam (N/A if not applicable):   - Date of Tdap:   - CBC H/H:   - RPR:   -  Consent Form Signed (N/A if not applicable):   - BTL consent:   - C for pediatric care:     37 Week Labs: (Date)  - CBC H/H:   - RPR:   - GBS Culture:    - HIV:   - Cervical GC:   - Cervical Exam:     38 Weeks:  -Cervical Exam:     39 Weeks:  -Cervical Exam:  -NST Exam:    FM Continuity Patient: no  Previous : none    Scheduled delivery: induction or  (can NOT be scheduled any earlier than 39w0d):    Imaging    Initial US:   Performed today. Pending radiology read. Single  intrauterine pregnancy; measuring approx 16w5d.  bpm.    Anatomy Scan:    Assessment     1. 16 weeks gestation of pregnancy    2. Chronic hypertension affecting pregnancy        Plan     - OB Protocol discussed  - PNVs  - Start ASA 81mg qd for pre-eclampsia prevention given risk factors.  - BP elevated. Increase to Procardia 30mg BID. BP logs provided. Bring logs to next visit.  - Urine dip reviewed as above  - Routine labs: initial labs and quad screen ordered. Also ordered CMP, urine protein/Cr, 25h urine protein.  - Mother plans to breast and/or bottle feed  - Postpartum contraception discussion: pending  - ED precautions discussed: vaginal bleeding or leaking fluid, belly cramping or pain, SOB/chest pain, swelling of the face/lower extremities, vision changes. If don't feel the baby move in over an hour. Severe headache that are not resolved with medication       Return to clinic in 1 week in HROB for f/u HTN.    Grecia Fritz MD  [facility], HO-2[facility]

## 2025-02-13 NOTE — PATIENT INSTRUCTIONS
Well Child Exam    About this topic  A well child exam is a visit with your child's doctor to check your child's health. The doctor will check your child's growth, progress, and shot record. It is also a time for you to ask your child's doctor any questions you have about your child's health. Your child will have a full exam during the office visit. Other things that are sometimes checked are hearing, eyesight, and urine or blood tests. The doctor may give shots during your child's well visit.    General    Getting Ready for a Well Child Exam    A well child exam is a good time for you to talk with your child's doctor about any of these topics:    Eating habits or diet    How your child acts    Sleep issues    Growth    Safety    Vaccines    Toilet training    Teen years    How your child is doing in school or any learning concerns    Home life    You may want to make a written list of the things you want to talk about with your child's doctor. Be sure to bring your list of questions to your child's well visit. You may also want to do some research on your own before your office visit by reading books or looking at Web sites. Other family members, child caregivers, and grandparents may be able to help you too. Your child's doctor may ask also you about your family's health history or if your child is around anyone who smokes.    The Exam    The doctor measures your child's weight, height, and sometimes head size or body mass index (BMI). The doctor plots these numbers on a growth curve. The growth curve gives a picture of your baby's growth at each visit. The doctor may check your child's temperature, blood pressure, breathing, and heart rate. The doctor may listen to your child's heart, lungs, and belly. Your doctor will do a full exam of your child from the head to the toes.    Growth and Development Questions    Your doctor will ask you about your child's progress. The doctor will focus on the skills that are  likely to happen at your child's age. Some of these are motor skills like rolling over, walking, and running, while others are social skills, or how your child interacts with other people. Your child's doctor will also ask you how your child is doing in school.    Help for Parents    Your doctor will talk with you about any concerns you have about your child during this visit. The doctor may also talk with you about:    Getting family help or other support    Ways to help your child's brain growth    How your child plays and acts with others    Ways to help your child exercise    Safety    Eating habits    Vaccines    Quitting smoking    Help if you have a low mood after having a baby    Shots or Vaccines    It is important for your child to get shots on time. This protects from very serious illnesses like pertussis, measles, or some kinds of pneumonia. Sometimes, your child may need more than one dose of vaccine. The vaccines used today are safer than ever. Talk to your doctor if you have any questions or concerns about giving your child vaccines.    Well Child Exam Schedule    The American Academy of Pediatrics (AAP) suggests this plan for well child visits:    [place] (3 to 5 days old)    1 month old    2 months old    4 months old    6 months old    9 months old    12 months old    15 months old    18 months old    2 years old    30 months old    3 years old    4 years old    Once each year until age 21    Well child exams are very important. Since your child is healthy at this visit and it is scheduled ahead of time, you can think about things you want to ask your child's doctor. Be sure to follow the above plan for well child visits as well as any other visits your child's doctor suggests.    Where can I learn more?    Centers for Disease Control and Prevention    http://www.cdc.gov/vaccines     Healthy  Children    https://www.healthychildren.org/English/family-life/health-management/Pages/Well-Child-Care-A-Check-Up-for-Success.aspx    Disclaimer.  This generalized information is a limited summary of diagnosis, treatment, and/or medication information. It is not meant to be comprehensive and should be used as a tool to help the user understand and/or assess potential diagnostic and treatment options. It does NOT include all information about conditions, treatments, medications, side effects, or risks that may apply to a specific patient. It is not intended to be medical advice or a substitute for the medical advice, diagnosis, or treatment of a health care provider based on the health care provider's examination and assessment of a patients specific and unique circumstances. Patients must speak with a health care provider for complete information about their health, medical questions, and treatment options, including any risks or benefits regarding use of medications. This information does not endorse any treatments or medications as safe, effective, or approved for treating a specific patient. Up[employer]Date, Inc[employer] and its affiliates disclaim any warranty or liability relating to this information or the use thereof. The use of this information is governed by the Terms of Use, available at Terms of Use. ©2022 [employer]Date, Inc. and its affiliates and/or licensors. All rights reserved.

## 2025-02-14 DIAGNOSIS — Z3A.16 16 WEEKS GESTATION OF PREGNANCY: Primary | ICD-10-CM

## 2025-02-14 LAB
HGB S BLD QL SOLY: NEGATIVE
RPR SER QL: NORMAL
RUBV IGG SERPL IA-ACNC: 1.5
RUBV IGG SERPL QL IA: POSITIVE
VZV IGG SER IA-ACNC: 1.9
VZV IGG SER QL IA: POSITIVE

## 2025-02-15 LAB — BACTERIA UR CULT: NORMAL

## 2025-02-18 ENCOUNTER — LAB VISIT (OUTPATIENT)
Dept: LAB | Facility: HOSPITAL | Age: 35
End: 2025-02-18
Payer: MEDICAID

## 2025-02-18 ENCOUNTER — OFFICE VISIT (OUTPATIENT)
Dept: FAMILY MEDICINE | Facility: CLINIC | Age: 35
End: 2025-02-18
Payer: MEDICAID

## 2025-02-18 VITALS
WEIGHT: 187.38 LBS | OXYGEN SATURATION: 100 % | HEIGHT: 66 IN | RESPIRATION RATE: 16 BRPM | TEMPERATURE: 98 F | SYSTOLIC BLOOD PRESSURE: 148 MMHG | BODY MASS INDEX: 30.11 KG/M2 | DIASTOLIC BLOOD PRESSURE: 107 MMHG | HEART RATE: 88 BPM

## 2025-02-18 DIAGNOSIS — D50.0 IRON DEFICIENCY ANEMIA DUE TO CHRONIC BLOOD LOSS: ICD-10-CM

## 2025-02-18 DIAGNOSIS — R79.89 LOW VITAMIN D LEVEL: ICD-10-CM

## 2025-02-18 DIAGNOSIS — Z3A.16 16 WEEKS GESTATION OF PREGNANCY: Primary | ICD-10-CM

## 2025-02-18 DIAGNOSIS — I10 PRIMARY HYPERTENSION: Primary | ICD-10-CM

## 2025-02-18 LAB
BILIRUB SERPL-MCNC: NORMAL MG/DL
BLOOD URINE, POC: NORMAL
CLARITY, POC UA: CLEAR
COLOR, POC UA: YELLOW
GLUCOSE UR QL STRIP: NORMAL
KETONES UR QL STRIP: NORMAL
LEUKOCYTE ESTERASE URINE, POC: NORMAL
NITRITE, POC UA: NORMAL
PH, POC UA: 6.5
PROT 24H UR-MCNC: 56.8 MG/24 H (ref 0–165)
PROT UR STRIP-MCNC: 7.1 MG/DL
PROTEIN, POC: NORMAL
SPECIFIC GRAVITY, POC UA: 1.02
TOTAL VOLUME  (OHS): 800 ML
UROBILINOGEN, POC UA: NORMAL

## 2025-02-18 PROCEDURE — 99213 OFFICE O/P EST LOW 20 MIN: CPT | Mod: PBBFAC

## 2025-02-18 PROCEDURE — 84156 ASSAY OF PROTEIN URINE: CPT

## 2025-02-18 RX ORDER — LABETALOL 200 MG/1
200 TABLET, FILM COATED ORAL 2 TIMES DAILY
Qty: 60 TABLET | Refills: 11 | Status: SHIPPED | OUTPATIENT
Start: 2025-02-18 | End: 2026-02-18

## 2025-02-18 NOTE — PATIENT INSTRUCTIONS
"If DBP ("bottom number" of blood pressure reading) > 100, please call to make an appointment in 1 week   "

## 2025-02-18 NOTE — PROGRESS NOTES
"[facility] OB OFFICE VISIT NOTE  Savanah Pang  34 y.o.  [id number]  2025      Chief Complaint: Routine Prenatal Visit (HROB 16w6d, based on LMP. No complaints.)      Savanah Pang is a 34 y.o.  female at 16w6d by  LMP consistent with first trimester ultrasound (KARINA Estimated Date of Delivery: 25) presenting to [facility] for OB routine visit.    Current Issues: cHTN not resolved with Procardia 60 mg daily. Denies fever/chills, HA, blurry vision, CP, SOB, N/V, constipation/diarrhea, depression/anxiety    Gestational History:    OB History    Para Term  AB Living   6 5 5 0 0 5   SAB IAB Ectopic Multiple Live Births   0 0 0 0 5      # Outcome Date GA Lbr Corby/2nd Weight Sex Type Anes PTL Lv   6 Current            5 Term 01/10/24    M Vag-Spont   ERIKA   4 Term 18    F Vag-Spont   ERIKA   3 Term 14    F Vag-Spont   ERIKA   2 Term 10/20/10    M Vag-Spont   ERIKA   1 Term 10/28/09    F Vag-Spont   ERIKA       Gyn History:   LMP: Patient's last menstrual period was 10/23/2024 (exact date).  Age at menarche: 12 years  Menstrual hx: regular/irregular, 28-30 day cycles, 3-4 pads/day, 4 days per period  History of birth control: OCP and IUD which removed   History of STDs: S/p treatment of syphilis ()  Abnormal PAPs: (2024): NIL, neeg HRHPV  History of prior : None    Past Medical History: HTN  Surgical History: None  Family History: Noncontributory   Social History: Denies tobacco, alcohol and illicit drug use  Medications: Procardia 30 mg daily    Antepartum specific:   Fetal movements: Yes  Vaginal bleeding: No  Vaginal discharge: No  Loss of fluid: No  Contractions: No  Headaches: No  Vision changes: No  Edema: No      Vitals:    25 0807   BP: (!) 148/107   BP Location: Right arm   Patient Position: Sitting   Pulse: 88   Resp: 16   Temp: 98.2 °F (36.8 °C)   TempSrc: Oral   SpO2: 100%   Weight: 85 kg (187 lb 6.4 oz)   Height: 5' 6" (1.676 m)       Physical Exam: "   General: in no acute distress  RESP: clear to auscultation bilaterally, non labored  CV: regular rate and rhythm, no murmurs, no edema  ABD: gravid, nontender, BS+  FHTs: 150 bpm by Doppler  Fundal height: Palpable inferior to umbilicus to left    Current Medications:   Medications:  See list below  Current Outpatient Medications   Medication Instructions    aspirin (ECOTRIN) 81 mg, Oral, Daily    cholecalciferol (vitamin D3) 1,250 mcg, Oral, Every 7 days    labetaloL (NORMODYNE) 200 mg, Oral, 2 times daily    NIFEdipine (PROCARDIA-XL) 60 mg, Oral, Daily    PNV,calcium 72-iron-folic acid (PRENATAL VITAMIN PLUS LOW IRON) 27 mg iron- 1 mg Tab 1 each, Oral, Daily      Labs:  Urine dipstick:   Lab Results   Component Value Date    COLORU Yellow 02/18/2025    SPECGRAV 1.025 02/18/2025    PHUR 6.5 02/18/2025    WBCUR neg 02/18/2025    NITRITE neg 02/18/2025    PROTEINPOC neg 02/18/2025    GLUCOSEUR neg 02/18/2025    KETONESU neg 02/18/2025    UROBILINOGEN 0.2 E.U./dL 02/18/2025    BILIRUBINPOC neg 02/18/2025    RBCUR neg 02/18/2025       Initial OB Labs: (2/13/2025)  Blood Type and Rh: O neg  Antibody Screen: Pos  CBC H/H: 12.7/36.7  BUN/Cr: 5.8/0.66  HIV: NR  Syphilis Ab (RPR): Ab - Reactive, RPR - NR  GC: ND  CT: ND  HBsAg: NR  HCVAb: NR  Rubella: Immune  Varicella: Immune  UA & Culture: Mixed Skin/Urogenital Alexandra Isolated, no further workup   Sickle Cell Screen: Neg  Urine protein/Cr: <6.8/65.3  PAP: Pending  Influenza vaccine date: Informed refusal  BTL desired: No, considered IUD  Aspirin: Prescribed   chronic htn  Age >/= 35 years     15-20 Weeks Lab: (2/13/2025)   Quad Screen: Normal risk    Imaging:   Initial US 2/13/2025: Single intrauterine pregnancy with a fetal heart rate of 162 beats per minute.     Anatomy Scan (Ordered 2/14/2025, Pending)    Assessment:   1. 16 weeks gestation of pregnancy    2. Chronic hypertension affecting pregnancy     Plan:  OB Protocol  PNVs  Continue ASA 81 mg daily for  pre-eclampsia prevention given risk factors  BP still not controlled (147/107 in office). Encourage BP log and bring to next visit  Continue Procardia 30 mg BID  Start Labetalol 200 mg BID  Urine dip reviewed as above  Routine labs: reviewed as above  Mother plans to bottle feed  Postpartum contraception discussion: IUD  FM Continuity patient after delivery: Yes  ED and labor precautions discussed in depth; if vaginal bleeding or leaking fluid, belly cramping or pain, shortness of breath-chest pain, swelling of the face-hands, ankles and feet or legs. If don't feel the baby move in over an hour. Change in vision. Severe headache that are not resolved with medication  Keep appointment with MFM (3/10/2025)  Return to clinic in 2 weeks (3/4/2025). However, if DBP > 100, call to make appointment in 1 week    John Linn MD  [facility] Resident HO-1  02/18/2025

## 2025-03-06 ENCOUNTER — OFFICE VISIT (OUTPATIENT)
Dept: FAMILY MEDICINE | Facility: CLINIC | Age: 35
End: 2025-03-06
Payer: MEDICAID

## 2025-03-06 VITALS
OXYGEN SATURATION: 100 % | TEMPERATURE: 98 F | WEIGHT: 186.38 LBS | SYSTOLIC BLOOD PRESSURE: 133 MMHG | RESPIRATION RATE: 18 BRPM | HEART RATE: 69 BPM | DIASTOLIC BLOOD PRESSURE: 83 MMHG | HEIGHT: 66 IN | BODY MASS INDEX: 29.95 KG/M2

## 2025-03-06 DIAGNOSIS — Z3A.19 19 WEEKS GESTATION OF PREGNANCY: Primary | ICD-10-CM

## 2025-03-06 DIAGNOSIS — O10.919 CHRONIC HYPERTENSION AFFECTING PREGNANCY: ICD-10-CM

## 2025-03-06 LAB
BILIRUB SERPL-MCNC: NORMAL MG/DL
BLOOD URINE, POC: NORMAL
CLARITY, POC UA: CLEAR
COLOR, POC UA: YELLOW
GLUCOSE UR QL STRIP: NORMAL
KETONES UR QL STRIP: NORMAL
LEUKOCYTE ESTERASE URINE, POC: NORMAL
NITRITE, POC UA: NORMAL
PH, POC UA: 6
PROTEIN, POC: NORMAL
SPECIFIC GRAVITY, POC UA: >1.03
UROBILINOGEN, POC UA: 0.2

## 2025-03-06 PROCEDURE — 99213 OFFICE O/P EST LOW 20 MIN: CPT | Mod: PBBFAC

## 2025-03-06 NOTE — PROGRESS NOTES
[facility] OB OFFICE VISIT NOTE     Primary PCP: Lamar Thorpe MD    Chief Complaint     Savanah Pang is a 34 y.o. female  at 19w1d 2025, by Last Menstrual Period consistent with first trimester US, presenting to [facility] for routine OB visit.    HPI: Ms. Pang states that she is doing well today and has no complaints. Is adherent to Procardia 30 mg BID and labetalol 200 mg BID.     Relevant PMH:   cHTN     Obstetrics History     OB History          6    Para   5    Term   5            AB        Living   5         SAB        IAB        Ectopic        Multiple        Live Births   5                  Gynecology History   LMP: Patient's last menstrual period was 10/23/2024 (exact date).  Age at menarche: 12 years  Menstrual hx: regular, 28-30 day cycles, 3-4 pads/day, 4 days per period  History of birth control: OCP and IUD (removed )  History of STDs: S/p treatment of syphilis ()  Abnormal PAPs: (2024): NIL, neg HRHPV  History of prior : None    Medical History    Past Medical History: HTN  Surgical History: None  Family History: Noncontributory   Social History: Denies tobacco, alcohol and illicit drug use  Medications: below    Review of Systems   Review of Systems   Constitutional:  Negative for chills and fever.   Respiratory:  Negative for shortness of breath.    Cardiovascular:  Negative for chest pain.   Gastrointestinal:  Negative for abdominal pain, nausea and vomiting.   Genitourinary:  Negative for difficulty urinating and dysuria.       Antepartum specific   - Fetal movements: yes  - Vaginal bleeding: no  - Vaginal discharge: no  - Loss of fluid: no  - Contractions: no  - Headaches: no  - Vision changes: no  - Edema: yes, trace edema ankles bilaterally    Vital Signs     Vitals:    25 1254   BP: 133/83   BP Location: Right arm   Patient Position: Sitting   Pulse: 69   Resp: 18   Temp: 97.9 °F (36.6 °C)   TempSrc: Oral   SpO2: 100%   Weight: 84.6 kg  "(186 lb 6.4 oz)   Height: 5' 6" (1.676 m)       Physical Exam   General: in no acute distress  RESP: clear to auscultation bilaterally, non labored  CV: regular rate and rhythm, no murmurs, trace edema bilateral ankles  ABD: non-tender, BS+  FHTs: 136 bpm via Doppler  Fundal height: just below umbilicus    Current Medications:      Current Outpatient Medications   Medication Instructions    aspirin (ECOTRIN) 81 mg, Oral, Daily    labetaloL (NORMODYNE) 200 mg, Oral, 2 times daily    NIFEdipine (PROCARDIA-XL) 60 mg, Oral, Daily    PNV,calcium 72-iron-folic acid (PRENATAL VITAMIN PLUS LOW IRON) 27 mg iron- 1 mg Tab 1 each, Oral, Daily       Labs   Urine dipstick:   Lab Results   Component Value Date    COLORU Yellow 2025    SPECGRAV >1.030 2025    PHUR 6.0 2025    WBCUR neg 2025    NITRITE neg 2025    PROTEINPOC neg 2025    GLUCOSEUR neg 2025    KETONESU neg 2025    UROBILINOGEN 0.2 2025    BILIRUBINPOC neg 2025    RBCUR neg 2025       Initial OB Labs: (2025)  - Blood Type and Rh: O neg  - Antibody Screen: Pos, Anti-D  - CBC H/H: 12.7/36.7  - BUN/Cr: 5.8/0.66  - HIV: NR  - Syphilis: Ab - Reactive, RPR - NR  - GC: ND  - CT: ND  - HBsAg: NR  - HCVAb: NR  - Rubella: Immune  - Varicella: Immune  - UA & Culture: Mixed Skin/Urogenital Alexandra Isolated, no further workup   - Sickle Cell Screen: Neg  - Urine protein/Cr: <6.8/65.3  - PAP: NIL HRHPV negative  - Influenza vaccine date: Informed refusal  - BTL desired: No  - Aspirin: Prescribed Indications: chronic htn, Age >/= 35 years     15-20 Weeks Lab: (2025)  - Quad Screen: normal risk    28 Week Labs: (Date)  - 1H GTT:   - Rhogam (N/A if not applicable):   - Date of Tdap:   - CBC H/H:   - RPR:   -  Consent Form Signed (N/A if not applicable):   - BTL consent:   - [facility] for pediatric care:     37 Week Labs: (Date)  - CBC H/H:   - RPR:   - GBS Culture:   - HIV:   - Cervical GC:   - Cervical Exam: "     38 Weeks:  -Cervical Exam:     39 Weeks:  -Cervical Exam:  -NST Exam:    FM Continuity Patient: no  Previous : no    Scheduled delivery: induction or  (can NOT be scheduled any earlier than 39w0d):    Imaging    Initial US: 25  Single intrauterine pregnancy with a fetal heart rate of 162 beats per minute. Estimated age by ultrasound 16 weeks and 5 days +/-1 week 1 day. Estimated date of delivery by ultrasound 2025    Anatomy Scan:    Assessment     1. 19 weeks gestation of pregnancy    2. Chronic hypertension affecting pregnancy        Plan     - OB Protocol discussed  - PNVs  - Continue ASA 81 mg daily  - Urine dip reviewed as above  - Routine labs: as above, reviewed  - Mother plans to bottle feed  - Postpartum contraception discussion: desires IUD  - Keep appt/US with M 3/10/25  - Blood pressures at goal with current medication regimen. Continue Procardia 30 mg BID and labetalol 200 mg BID. Instructed to keep BP log; call with any persistently elevated BP or present to OB ED for any severe range BP  - ED precautions discussed: vaginal bleeding or leaking fluid, belly cramping or pain, SOB/chest pain, RUQ pain, swelling of the face/lower extremities, vision changes. If don't feel the baby move in over an hour. Severe headaches that are not resolved with medication.       Return to clinic in 2 weeks for follow up.    Regine Sylvester MD  [facility]

## 2025-03-10 ENCOUNTER — LAB VISIT (OUTPATIENT)
Dept: LAB | Facility: HOSPITAL | Age: 35
End: 2025-03-10
Attending: NURSE PRACTITIONER
Payer: MEDICAID

## 2025-03-10 ENCOUNTER — PROCEDURE VISIT (OUTPATIENT)
Dept: MATERNAL FETAL MEDICINE | Facility: CLINIC | Age: 35
End: 2025-03-10
Payer: MEDICAID

## 2025-03-10 ENCOUNTER — OFFICE VISIT (OUTPATIENT)
Dept: MATERNAL FETAL MEDICINE | Facility: CLINIC | Age: 35
End: 2025-03-10
Payer: MEDICAID

## 2025-03-10 VITALS
BODY MASS INDEX: 30.54 KG/M2 | WEIGHT: 190.06 LBS | HEART RATE: 73 BPM | SYSTOLIC BLOOD PRESSURE: 151 MMHG | HEIGHT: 66 IN | DIASTOLIC BLOOD PRESSURE: 98 MMHG

## 2025-03-10 DIAGNOSIS — O26.872 SHORT CERVICAL LENGTH DURING PREGNANCY IN SECOND TRIMESTER: ICD-10-CM

## 2025-03-10 DIAGNOSIS — Z3A.16 16 WEEKS GESTATION OF PREGNANCY: ICD-10-CM

## 2025-03-10 DIAGNOSIS — O36.0120 ANTI-D ANTIBODIES PRESENT DURING PREGNANCY IN SECOND TRIMESTER, SINGLE OR UNSPECIFIED FETUS: ICD-10-CM

## 2025-03-10 DIAGNOSIS — O10.919 CHRONIC HYPERTENSION AFFECTING PREGNANCY: ICD-10-CM

## 2025-03-10 DIAGNOSIS — Q75.8 CONGENITAL HYPOPLASIA OF NASAL BONE: ICD-10-CM

## 2025-03-10 DIAGNOSIS — O09.522 MULTIGRAVIDA OF ADVANCED MATERNAL AGE IN SECOND TRIMESTER: Primary | ICD-10-CM

## 2025-03-10 DIAGNOSIS — O09.899 HISTORY OF MATERNAL SYPHILIS, CURRENTLY PREGNANT: ICD-10-CM

## 2025-03-10 PROBLEM — O98.112 SYPHILIS AFFECTING PREGNANCY IN SECOND TRIMESTER: Status: ACTIVE | Noted: 2025-03-10

## 2025-03-10 LAB — ANTIBODY TITER: 1

## 2025-03-10 PROCEDURE — 86886 COOMBS TEST INDIRECT TITER: CPT | Performed by: NURSE PRACTITIONER

## 2025-03-10 PROCEDURE — 36415 COLL VENOUS BLD VENIPUNCTURE: CPT

## 2025-03-10 RX ORDER — NIFEDIPINE 60 MG/1
60 TABLET, EXTENDED RELEASE ORAL 2 TIMES DAILY
Qty: 60 TABLET | Refills: 6 | Status: SHIPPED | OUTPATIENT
Start: 2025-03-10

## 2025-03-10 NOTE — ASSESSMENT & PLAN NOTE
Today I counseled the patient on the relationship between maternal age and fetal aneuploidy.  We discussed the risks and benefits of screening tests versus definitive genetic testing (amniocentesis). She was counseled about her specific age-related risk of fetal aneuploidy. We discussed the limitations of ultrasound in the definitive diagnosis of fetal aneuploidy.  I quoted the patient a 1 in 900 procedure related risk of fetal loss with genetic amniocentesis.  We also discussed cell free fetal DNA screening including the sensitivity and specificity of the test.  Her questions were answered and after today's consultation she did not want to pursue amniocentesis.  She completed a quad scren with low risk results.  Additionally, we discussed the association between advanced maternal age (AMA) and preeclampsia, gestational diabetes, and fetal growth restriction.    Recommendations:  Detailed anatomic survey at 19-20 weeks

## 2025-03-10 NOTE — ASSESSMENT & PLAN NOTE
There are > 50 different red cell antigens that have been associated with hemolytic disease of the fetus and  (HDFN), but the antibodies frequently associated with severe HDFN include those against RhD, Rhc, and [place] (K1). 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   We discussed the pathophysiology of antibody sensitization as well as the risk of fetal anemia and hydrops. HDFN does not usually occur until a critical titer is reached (1:16 at [facility]).     Recommendations:  FOB- Presumed positive at least heterozygous (he is father of all their children).  If the FOB is unknown, declines testing, FOB is homozygous for the antigen, or if the patient declines amniocentesis, recommend repeat antibody titer at 4-week intervals, using the same lab as the initial titers. If the patients antibody titer rises to a critical value of 1:16, there are two main options:   Politely declined amniocentesis.  Fetal surveillance by assessment of the MCA peak systolic velocity (PSV) every one to two weeks as determined by MFM.  If the MCA PSV reaches > 1.5 MoM, fetal blood sampling and transfusion may be necessary. This will be coordinated by MF     After reaching the critical titer for MCA PSV screening, it is no longer necessary to perform serial antibody titers.  If the fetus is at risk for HDFN (i.e.: undergoing MCA surveillance)  Weekly  testing starting at 32 weeks  Delivery between 37 0/7 - 37 6/7 weeks if no transfusion needed.   Individualization of delivery timing would occur if transfusion is required. Because data are limited regarding timing of delivery, each case will be individualized by the MFM team.  If critical titer is not found, delivery at 39 weeks may be undertaken with no PNT indicated.  Consider maternal blood cross-matching at admission for delivery (if rare antibody and increased risk of hemorrhage) due to potential delay in finding matched units.     Please note that this management  scheme assumes that the patients partner is indeed the father of the baby.

## 2025-03-10 NOTE — ASSESSMENT & PLAN NOTE
A hypoplastic nasal bone is associated with an increased risk of Down syndrome when isolated. It increases the chance of Down Syndrome 6.6 times. It is estimated that 0.1-1.2% of euploid fetuses have a hypoplastic nasal bone. Hypoplasia of the nasal bone also appears to be more common in fetuses of Afro-Michael women (up to 9%) than those of  women (0.5%). The identification of an absent or hypoplastic nasal bone can be associated with Down syndrome or may be a normal variant. Assuming there is not an underlying chromosomal abnormality, genetic syndrome, or other fetal abnormalities, hypoplastic or absent nasal bone is most likely a normal variant. After a finding of isolated absent nasal bone on ultrasound cfDNA screening vs amniocentesis are recommended. If cfDNA has already been completed and is normal no further testing is recommended.    Two different categories of tests are available in pregnancy to assess for fetal chromosomal abnormalities after a finding of hypoplastic or absent nasal bone, screening tests and diagnostic tests. Screening tests do not provide a definitive answer as to whether a fetus is affected with a chromosome disorder. Rather, screening tests indicate an increased or decreased chance of whether a pregnancy is affected by a condition. cfDNA screening is a recommended option after an isolated absent nasal bone is identified on ultrasound. In all people, small pieces of genetic material (DNA) that are not contained within cells are present in the blood stream. During pregnancy, these small pieces of DNA are a mixture of the mother's DNA and DNA shed from the placenta. This test can indicate if there is an abnormal number of chromosomes 21, 18, 13, X, and Y. The approximate detection rate is 99% for Trisomy 21, 98% for Trisomy 18, 91% for Trisomy 13, and 90% for Monroe Syndrome. The false positive rates are low ranging from 1/1000 to 2/1000.    Screening tests are not definitive.  If the test indicates an increased risk for a chromosome problem, it is recommended to confirm with a diagnostic test such as amniocentesis or CVS. Alternatively, a low risk or negative result on a screening test is reassuring, but it does not eliminate the possibility that the fetus is affected with the condition.    We then reviewed diagnostic testing options. Unlike screening tests, diagnostic tests provide definitive answers regarding the presence of fetal chromosome abnormalities. In addition to assessing for the presence of the common fetal aneuploidies, diagnostic tests can assess for abnormalities in the number and structure of all chromosomes. While both procedures are generally quite safe, there is a risk of miscarriage associated with these procedures. For genetic amniocentesis, the estimated risk of miscarriage attributable to the procedure is 1 in 900.    At the end of our consultation, the patient politely declined further invasive testing given negative quad screen.

## 2025-03-10 NOTE — ASSESSMENT & PLAN NOTE
On today's ultrasound, a short cervix is identified. I counseled the patient regarding the potential implications of a short cervix including the risk of  birth. We reviewed the decreased incidence of  birth in women with a short cervix without a history or  birth who were given nightly vaginal progesterone (prometrium 200mg). Patient was counseled to avoid heavy lifting and moderate to high impact exercise. Pelvic rest can be considered but does not affect  birth risk. Bedrest is not recommended due to risk of thromboembolism and no proven benefit with respect to  delivery. I counseled the patient regarding the signs and symptoms or  labor. We reviewed the thresholds of viability and reviewed prematurity complications. We discussed that based on her current cervical length, closed cervix, and no history of a prior  birth that she is not currently a cerclage candidate. We discussed that if the cervix were to dilate, cerclage could be considered up until a gestational age of 22 weeks 6 days. Prometrium is contraindicated in those with a peanut allergy.    3/10/25- CL 27mm. No funneling.    Recommendations:  Repeat cervical length in 1 week  Avoid moderate to high impact exercise and heavy lifting  Strict PTL precautions reviewed with patient  If any concern for cervical dilation prior to 23 weeks and 6 days, and patient does not have PTL symptoms or significant bleeding, please contact [facility] for evaluation for physical exam indicated cerclage

## 2025-03-10 NOTE — PROGRESS NOTES
MATERNAL-FETAL MEDICINE   CONSULT NOTE    Provider requesting consultation: [facility]    SUBJECTIVE:     Ms. Savanah Pang is a 34 y.o.  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  She had a negative quad screen.  She has a history of chronic hypertension and was taking Losartan and HCTZ prior to pregnancy.  She was transitioned to Nifedipine 30mg XL once daily shortly after Luis due to discovery of pregnancy.  Dosage was increased to twice daily on  due to persistently elevated blood pressures.  Close interval follow-up 5 days later revealed persistently elevated blood pressures, therefore, Labetalol 200 mg twice daily was added to her regimen. She is taking a low dose aspirin daily. She completed baseline HTN labs. She is checking her Bps at home and states they have been normal. Asymptomatic today with elevated Bps.   She has a history of syphilis that was treated in 2017. Her partner was also treated. RPR currently non-reactive.  She had a positive antibody screen with routine prenatal labs (anti-D). She states she had to get RhoGam injections a few times in her previous pregnancy due to VB. She denies any bleeding episode and has not received RhoGam thus far with her current pregnancy. No titer completed.          Medication List with Changes/Refills   New Medications    NIFEDIPINE (PROCARDIA-XL) 60 MG (OSM) 24 HR TABLET    Take 1 tablet (60 mg total) by mouth 2 (two) times a day.   Current Medications    ASPIRIN (ECOTRIN) 81 MG  "EC TABLET    Take 1 tablet (81 mg total) by mouth once daily.    LABETALOL (NORMODYNE) 200 MG TABLET    Take 1 tablet (200 mg total) by mouth 2 (two) times a day.    PNV,CALCIUM 72-IRON-FOLIC ACID (PRENATAL VITAMIN PLUS LOW IRON) 27 MG IRON- 1 MG TAB    Take 1 tablet (1 each total) by mouth once daily.   Discontinued Medications    NIFEDIPINE (PROCARDIA-XL) 30 MG (OSM) 24 HR TABLET    Take 2 tablets (60 mg total) by mouth once daily.       Review of patient's allergies indicates:  No Known Allergies    PMH:  Past Medical History:   Diagnosis Date    Hypertension        PObHx:  OB History    Para Term  AB Living   6 5 5   5   SAB IAB Ectopic Multiple Live Births       5      # Outcome Date GA Lbr Corby/2nd Weight Sex Type Anes PTL Lv   6 Current            5 Term 01/10/24 37w0d  2.863 kg (6 lb 5 oz) M Vag-Spont   ERIKA   4 Term 18 38w0d  2.665 kg (5 lb 14 oz) F Vag-Spont   ERIKA   3 Term 14 38w0d  2.92 kg (6 lb 7 oz) F Vag-Spont   ERIKA   2 Term 10/20/10 38w0d  3.147 kg (6 lb 15 oz) M Vag-Spont   ERIKA   1 Term 10/28/09 40w0d  2.863 kg (6 lb 5 oz) F Vag-Spont  N ERIKA       PSH:History reviewed. No pertinent surgical history.    Family history:family history includes Hypertension in her mother; No Known Problems in her father.    Social history: reports that she has never smoked. She has never been exposed to tobacco smoke. She has never used smokeless tobacco. She reports that she does not currently use alcohol. She reports that she does not use drugs.    Genetic history: The patient denies any inherited genetic diseases or birth defects in herself or her partner's personal history or family.    Objective:   BP (!) 151/98 (BP Location: Right arm, Patient Position: Sitting)   Pulse 73   Ht 5' 6" (1.676 m)   Wt 86.2 kg (190 lb 0.6 oz)   LMP 10/23/2024 (Exact Date)   Breastfeeding Unknown   BMI 30.67 kg/m²     Ultrasound performed. See viewpoint for full ultrasound report.    A detailed fetal " anatomic ultrasound examination was performed for the following high risk indication: AMA, absent nasal bone.   Absent nasal bone is suspected on US today and no other abnormalities are seen. Fetal imaging is incomplete today.   A follow-up study will be scheduled to complete the fetal anatomic survey.   Fetal size today is consistent with established gestational age.   Transvaginal cervical length measures 2.7cm without funneling.   Placental location is anterior without evidence of previa.   MCA Dopplers are normal.     ASSESSMENT/PLAN:     34 y.o.  female with IUP at 19w5d     - Multigravida of advanced maternal age in second trimester  Today I counseled the patient on the relationship between maternal age and fetal aneuploidy.  We discussed the risks and benefits of screening tests versus definitive genetic testing (amniocentesis). She was counseled about her specific age-related risk of fetal aneuploidy. We discussed the limitations of ultrasound in the definitive diagnosis of fetal aneuploidy.  I quoted the patient a 1 in 900 procedure related risk of fetal loss with genetic amniocentesis.  We also discussed cell free fetal DNA screening including the sensitivity and specificity of the test.  Her questions were answered and after today's consultation she did not want to pursue amniocentesis.  She completed a quad scren with low risk results.  Additionally, we discussed the association between advanced maternal age (AMA) and preeclampsia, gestational diabetes, and fetal growth restriction.    Recommendations:  Detailed anatomic survey at 19-20 weeks      - Congenital hypoplasia of nasal bone  A hypoplastic nasal bone is associated with an increased risk of Down syndrome when isolated. It increases the chance of Down Syndrome 6.6 times. It is estimated that 0.1-1.2% of euploid fetuses have a hypoplastic nasal bone. Hypoplasia of the nasal bone also appears to be more common in fetuses of Afro-Michael  women (up to 9%) than those of  women (0.5%). The identification of an absent or hypoplastic nasal bone can be associated with Down syndrome or may be a normal variant. Assuming there is not an underlying chromosomal abnormality, genetic syndrome, or other fetal abnormalities, hypoplastic or absent nasal bone is most likely a normal variant. After a finding of isolated absent nasal bone on ultrasound cfDNA screening vs amniocentesis are recommended. If cfDNA has already been completed and is normal no further testing is recommended.    Two different categories of tests are available in pregnancy to assess for fetal chromosomal abnormalities after a finding of hypoplastic or absent nasal bone, screening tests and diagnostic tests. Screening tests do not provide a definitive answer as to whether a fetus is affected with a chromosome disorder. Rather, screening tests indicate an increased or decreased chance of whether a pregnancy is affected by a condition. cfDNA screening is a recommended option after an isolated absent nasal bone is identified on ultrasound. In all people, small pieces of genetic material (DNA) that are not contained within cells are present in the blood stream. During pregnancy, these small pieces of DNA are a mixture of the mother's DNA and DNA shed from the placenta. This test can indicate if there is an abnormal number of chromosomes 21, 18, 13, X, and Y. The approximate detection rate is 99% for Trisomy 21, 98% for Trisomy 18, 91% for Trisomy 13, and 90% for Omnroe Syndrome. The false positive rates are low ranging from 1/1000 to 2/1000.    Screening tests are not definitive. If the test indicates an increased risk for a chromosome problem, it is recommended to confirm with a diagnostic test such as amniocentesis or CVS. Alternatively, a low risk or negative result on a screening test is reassuring, but it does not eliminate the possibility that the fetus is affected with the  condition.    We then reviewed diagnostic testing options. Unlike screening tests, diagnostic tests provide definitive answers regarding the presence of fetal chromosome abnormalities. In addition to assessing for the presence of the common fetal aneuploidies, diagnostic tests can assess for abnormalities in the number and structure of all chromosomes. While both procedures are generally quite safe, there is a risk of miscarriage associated with these procedures. For genetic amniocentesis, the estimated risk of miscarriage attributable to the procedure is 1 in 900.    At the end of our consultation, the patient politely declined further invasive testing given negative quad screen.       - Chronic hypertension affecting pregnancy  Today I counseled the patient on maternal/fetal risks associated with CHTN during pregnancy. Risks include but not limited to fetal growth restriction, miscarriage, abruption, maternal end organ disease (renal failure, MI, and stroke),  delivery, development of superimposed preeclampsia, and eclampsia. She was counseled on the recommendations for blood pressure control, serial ultrasound for fetal growth assessment and  testing, and timing of delivery. I also counseled her on the recommendation for aspirin 81 mg daily which may decrease her risk of developing superimposed preeclampsia.     3/10/25- Currently taking Nifedipine 30mg XL BID and Labetalol 200mg BID    Recommendations (Please refer to [facility] guidelines):  -Continue aspirin 81 mg daily for preeclampsia risk reduction  -Continue current medications: Increase Nifedipine to 60mg XL BID and continue Labetalol 200mg BID  -Baseline evaluation with primary OB:   24-hour urine protein or baseline P/C ratio, CMP, and CBC (completed)  Maternal EKG  Maternal ophthalmic evaluation  Maternal echocardiogram if HTN has been long-standing or EKG is abnormal  -Serial fetal growth ultrasounds every 4-6 weeks, beginning at  26-28 weeks.   -Continued close observation of patient's blood pressures. Avoid hypotension as this has been associated with uteroplacental insufficiency.  -In conjunction with the CHAP study recommendation for BP control: If BP is persistently >=140/90 antihypertensive medication is recommended with goal BP of 120-140/80-90.  -Weekly antepartum testing at 32 weeks (NST+AFV); twice weekly testing if control is poor, multiple comorbidities are present, or requires several medications for control   -Delivery timing:  No medications, no comorbid conditions: 39 0/7 - 39 6/7 weeks gestation  No medications, comorbid conditions: 38 0/7 - 38 6/7 weeks gestation  Controlled on single agent, no comorbid conditions: 38 0/7 - 38 6/7 weeks gestation  Controlled on single agent, comorbid conditions: 37 0/7 - 38 6/7 weeks gestation  Uncontrolled or requiring >= 2 medications: 36 0/7 - 37 6/7 weeks gestation    Comorbid conditions include BMI >= 40, diabetes, and complex medical condition associated with placental dysfunction (ie lupus or other vascular disease)  Delivery may be recommended earlier pending results of fetal growth ultrasounds, AFV assessment, or antepartum testing results.        - History of maternal syphilis, currently pregnant  Syphilis is a sexually and congenitally transmitted disease caused by the spirochete Treponema pallidum. Early infections can be found due to symptoms (such as chancre, skin rash, lymphadenopathy, etc.) or due to recent seroconversion. Latent syphilis acquired within the preceding year is referred to as early latent syphilis; all other cases of latent syphilis are late latent syphilis or syphilis of unknown duration. T. pallidum can infect the central nervous system and result in neurosyphilis, which can occur at any stage of syphilis. Early neurologic clinical manifestations (i.e., cranial nerve dysfunction, meningitis, stroke, acute altered mental status, and auditory or ophthalmic  abnormalities) are usually present within the first few months or years of infection. Late neurologic manifestations (i.e., tabes dorsalis and general paresis) occur 10-30 years after infection.     Received full treatment in 2017 (partner also). Repeat RPR in first trimester: nonreactive.     Recommendations  - recheck RPR in third trimester  - already treated with PCN   - serial growth u/s    - Short cervical length during pregnancy in second trimester  On today's ultrasound, a short cervix is identified. I counseled the patient regarding the potential implications of a short cervix including the risk of  birth. We reviewed the decreased incidence of  birth in women with a short cervix without a history or  birth who were given nightly vaginal progesterone (prometrium 200mg). Patient was counseled to avoid heavy lifting and moderate to high impact exercise. Pelvic rest can be considered but does not affect  birth risk. Bedrest is not recommended due to risk of thromboembolism and no proven benefit with respect to  delivery. I counseled the patient regarding the signs and symptoms or  labor. We reviewed the thresholds of viability and reviewed prematurity complications. We discussed that based on her current cervical length, closed cervix, and no history of a prior  birth that she is not currently a cerclage candidate. We discussed that if the cervix were to dilate, cerclage could be considered up until a gestational age of 22 weeks 6 days. Prometrium is contraindicated in those with a peanut allergy.    3/10/25- CL 27mm. No funneling.    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   We discussed the pathophysiology of antibody sensitization as well as the risk of fetal anemia and hydrops. HDFN does not usually occur until a critical titer is reached ([facility]).     Recommendations:  FOB- Presumed positive at least heterozygous (he is father of all their children).  If the FOB is unknown, declines testing, FOB is homozygous for the antigen, or if the patient declines amniocentesis, recommend repeat antibody titer at 4-week intervals, using the same lab as the initial titers. If the patients antibody titer rises to a critical value of 1:16, there are two main options:   Politely declined amniocentesis.  Fetal surveillance by assessment of the MCA peak systolic velocity (PSV) every one to two weeks as determined by MFM.  If the MCA PSV reaches > 1.5 MoM, fetal blood sampling and transfusion may be necessary. This will be coordinated by [facility]     After reaching the critical titer for MCA PSV screening, it is no longer necessary to perform serial antibody titers.  If the fetus is at risk for HDFN (i.e.: undergoing MCA surveillance)  Weekly  testing starting at 32 weeks  Delivery between 37 0/7 - 37 6/7 weeks if no transfusion needed.   Individualization of delivery timing would occur if transfusion is required. Because data are limited regarding timing of delivery, each case will be individualized by the MFM team.  If critical titer is not found, delivery at 39 weeks may be undertaken with no PNT indicated.  Consider maternal blood cross-matching at admission for delivery (if rare antibody and increased risk of  hemorrhage) due to potential delay in finding matched units.     Please note that this management scheme assumes that the patients partner is indeed the father of the baby.          FOLLOW UP:   F/u in 1 week for MCAs/cx length  F/u in 4 weeks for US/MFM visit    This consultation was completed with the assistance of Adele Sainz NP.      Margarette Valdez MD  Maternal Fetal Medicine

## 2025-03-10 NOTE — ASSESSMENT & PLAN NOTE
Today I counseled the patient on maternal/fetal risks associated with CHTN during pregnancy. Risks include but not limited to fetal growth restriction, miscarriage, abruption, maternal end organ disease (renal failure, MI, and stroke),  delivery, development of superimposed preeclampsia, and eclampsia. She was counseled on the recommendations for blood pressure control, serial ultrasound for fetal growth assessment and  testing, and timing of delivery. I also counseled her on the recommendation for aspirin 81 mg daily which may decrease her risk of developing superimposed preeclampsia.     3/10/25- Currently taking Nifedipine 30mg XL BID and Labetalol 200mg BID    Recommendations ([facility] guidelines):  -Continue aspirin 81 mg daily for preeclampsia risk reduction  -Continue current medications: Increase Nifedipine to 60mg XL BID and continue Labetalol 200mg BID  -Baseline evaluation with primary OB:   24-hour urine protein or baseline P/C ratio, CMP, and CBC (completed)  Maternal EKG  Maternal ophthalmic evaluation  Maternal echocardiogram if HTN has been long-standing or EKG is abnormal  -Serial fetal growth ultrasounds every 4-6 weeks, beginning at 26-28 weeks.   -Continued close observation of patient's blood pressures. Avoid hypotension as this has been associated with uteroplacental insufficiency.  -In conjunction with the CHAP study recommendation for BP control: If BP is persistently >=140/90 antihypertensive medication is recommended with goal BP of 120-140/80-90.  -Weekly antepartum testing at 32 weeks (NST+AFV); twice weekly testing if control is poor, multiple comorbidities are present, or requires several medications for control   -Delivery timing:  No medications, no comorbid conditions: 39 0/7 - 39 6/7 weeks gestation  No medications, comorbid conditions: 38 0/7 - 38 6/7 weeks gestation  Controlled on single agent, no comorbid conditions: 38 0/7 - 38 6/7 weeks  gestation  Controlled on single agent, comorbid conditions: 37 0/7 - 38 6/7 weeks gestation  Uncontrolled or requiring >= 2 medications: 36 0/7 - 37 6/7 weeks gestation    Comorbid conditions include BMI >= 40, diabetes, and complex medical condition associated with placental dysfunction (ie lupus or other vascular disease)  Delivery may be recommended earlier pending results of fetal growth ultrasounds, AFV assessment, or antepartum testing results.

## 2025-03-10 NOTE — ASSESSMENT & PLAN NOTE
Syphilis is a sexually and congenitally transmitted disease caused by the spirochete Treponema pallidum. Early infections can be found due to symptoms (such as chancre, skin rash, lymphadenopathy, etc.) or due to recent seroconversion. Latent syphilis acquired within the preceding year is referred to as early latent syphilis; all other cases of latent syphilis are late latent syphilis or syphilis of unknown duration. T. pallidum can infect the central nervous system and result in neurosyphilis, which can occur at any stage of syphilis. Early neurologic clinical manifestations (i.e., cranial nerve dysfunction, meningitis, stroke, acute altered mental status, and auditory or ophthalmic abnormalities) are usually present within the first few months or years of infection. Late neurologic manifestations (i.e., tabes dorsalis and general paresis) occur 10-30 years after infection.     Received full treatment in 2017 (partner also). Repeat RPR in first trimester: nonreactive.     Recommendations  - recheck RPR in third trimester  - already treated with PCN   - serial growth u/s

## 2025-03-12 DIAGNOSIS — O10.919 CHRONIC HYPERTENSION AFFECTING PREGNANCY: ICD-10-CM

## 2025-03-12 DIAGNOSIS — O26.872 SHORT CERVICAL LENGTH DURING PREGNANCY IN SECOND TRIMESTER: ICD-10-CM

## 2025-03-12 DIAGNOSIS — O09.522 MULTIGRAVIDA OF ADVANCED MATERNAL AGE IN SECOND TRIMESTER: Primary | ICD-10-CM

## 2025-03-18 ENCOUNTER — OFFICE VISIT (OUTPATIENT)
Dept: FAMILY MEDICINE | Facility: CLINIC | Age: 35
End: 2025-03-18
Payer: MEDICAID

## 2025-03-18 VITALS
OXYGEN SATURATION: 100 % | HEART RATE: 75 BPM | WEIGHT: 190.81 LBS | DIASTOLIC BLOOD PRESSURE: 80 MMHG | BODY MASS INDEX: 30.67 KG/M2 | TEMPERATURE: 98 F | SYSTOLIC BLOOD PRESSURE: 119 MMHG | HEIGHT: 66 IN | RESPIRATION RATE: 18 BRPM

## 2025-03-18 DIAGNOSIS — O09.899 HISTORY OF MATERNAL SYPHILIS, CURRENTLY PREGNANT: ICD-10-CM

## 2025-03-18 DIAGNOSIS — Q75.8 CONGENITAL HYPOPLASIA OF NASAL BONE: ICD-10-CM

## 2025-03-18 DIAGNOSIS — O36.0120 ANTI-D ANTIBODIES PRESENT DURING PREGNANCY IN SECOND TRIMESTER, SINGLE OR UNSPECIFIED FETUS: ICD-10-CM

## 2025-03-18 DIAGNOSIS — O26.872 SHORT CERVICAL LENGTH DURING PREGNANCY IN SECOND TRIMESTER: ICD-10-CM

## 2025-03-18 DIAGNOSIS — O10.919 CHRONIC HYPERTENSION AFFECTING PREGNANCY: ICD-10-CM

## 2025-03-18 DIAGNOSIS — Z3A.20 20 WEEKS GESTATION OF PREGNANCY: Primary | ICD-10-CM

## 2025-03-18 LAB
BILIRUB SERPL-MCNC: NORMAL MG/DL
BLOOD URINE, POC: NORMAL
CLARITY, POC UA: CLEAR
COLOR, POC UA: YELLOW
GLUCOSE UR QL STRIP: NORMAL
KETONES UR QL STRIP: NORMAL
LEUKOCYTE ESTERASE URINE, POC: NORMAL
NITRITE, POC UA: NORMAL
PH, POC UA: 5.5
PROTEIN, POC: NORMAL
SPECIFIC GRAVITY, POC UA: >1.03
UROBILINOGEN, POC UA: 0.2

## 2025-03-18 PROCEDURE — 81002 URINALYSIS NONAUTO W/O SCOPE: CPT | Mod: PBBFAC

## 2025-03-18 PROCEDURE — 99214 OFFICE O/P EST MOD 30 MIN: CPT | Mod: PBBFAC

## 2025-03-18 NOTE — PROGRESS NOTES
[facility] OB OFFICE VISIT NOTE     Primary PCP: Lamar Thorpe MD    Chief Complaint     Savanah Pang is a 34 y.o. female  at 20w1d 2025, by Last Menstrual Period consistent with first trimester US, presenting to [facility] for routine OB visit.    HPI: Doing well today. No complaints. Adherent to Procardia 60mg BID and labetalol 200mg BID. Taking ASA 81mg qd. She is checking BP at home; did not bring logs to visit but states last night 118/84, this /78.     Relevant PMH:   cHTN - ASA 81mg qd, Procardia 60mg BID and labetalol 200mg BID.   AMA  Depression/anxiety  H/o syphilis that was treated in 2017. Her partner was also treated. RPR currently non-reactive.   Rh negative status. Anti-D antibody positive. Titer 1. Reports h/o Rhogam in prior pregnancy. No bleeding this pregnancy and has not yet received Rhogam in current pregnancy. Patient declined amniocentesis. MFM recommended repeat antibody titer at 4-week intervals.  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 Plan for f/u scan.  Short cervical length - following MFM for u/s     Obstetrics History     OB History          6    Para   5    Term   5            AB        Living   5         SAB        IAB        Ectopic        Multiple        Live Births   5                  Gynecology History   LMP: Patient's last menstrual period was 10/23/2024 (exact date).  Age at menarche: 12 years  Menstrual hx: regular, 28-30 day cycles, 3-4 pads/day, 4 days per period  History of birth control: OCP and IUD (removed )  History of STDs: S/p treatment of syphilis ()  Abnormal PAPs: (2024): NIL, neg HRHPV  History of prior : None    Medical History    Past Medical History: HTN  Surgical History: None  Family History: Noncontributory   Social History: Denies tobacco, alcohol and illicit drug use  Medications: below    Review of Systems   Review of Systems   Constitutional:  Negative for chills and fever.  "  Respiratory:  Negative for shortness of breath.    Cardiovascular:  Negative for chest pain.   Gastrointestinal:  Negative for abdominal pain, nausea and vomiting.   Genitourinary:  Negative for difficulty urinating and dysuria.       Antepartum specific   - Fetal movements: yes  - Vaginal bleeding: no  - Vaginal discharge: no  - Loss of fluid: no  - Contractions: no  - Headaches: no  - Vision changes: no  - Edema: yes, edema ankles bilaterally. Unchanged from prior.    Vital Signs     Vitals:    03/18/25 1344   BP: 119/80   BP Location: Right arm   Patient Position: Sitting   Pulse: 75   Resp: 18   Temp: 98.4 °F (36.9 °C)   TempSrc: Oral   SpO2: 100%   Weight: 86.5 kg (190 lb 12.8 oz)   Height: 5' 6" (1.676 m)       Physical Exam   General: in no acute distress  RESP: clear to auscultation bilaterally, non labored  CV: regular rate and rhythm, no murmurs, trace edema bilateral ankles  ABD: non-tender, BS+  FHTs: 147 bpm via Doppler  Fundal height: 19 cm    Current Medications:      Current Outpatient Medications   Medication Instructions    aspirin (ECOTRIN) 81 mg, Oral, Daily    labetaloL (NORMODYNE) 200 mg, Oral, 2 times daily    NIFEdipine (PROCARDIA-XL) 60 mg, Oral, 2 times daily    PNV,calcium 72-iron-folic acid (PRENATAL VITAMIN PLUS LOW IRON) 27 mg iron- 1 mg Tab 1 each, Oral, Daily       Labs   Urine dipstick:   Lab Results   Component Value Date    COLORU Yellow 03/18/2025    SPECGRAV >1.030 03/18/2025    PHUR 5.5 03/18/2025    WBCUR neg 03/18/2025    NITRITE neg 03/18/2025    PROTEINPOC trace 03/18/2025    GLUCOSEUR neg 03/18/2025    KETONESU neg 03/18/2025    UROBILINOGEN 0.2 03/18/2025    BILIRUBINPOC neg 03/18/2025    RBCUR neg 03/18/2025       Initial OB Labs: (2/13/2025)  - Blood Type and Rh: O neg  - Antibody Screen: Pos, Anti-D  - CBC H/H: 12.7/36.7  - BUN/Cr: 5.8/0.66  - HIV: NR  - Syphilis: Ab - Reactive, RPR - NR  - GC: ND  - CT: ND  - HBsAg: NR  - HCVAb: NR  - Rubella: Immune  - Varicella: " Immune  - UA & Culture: Mixed Skin/Urogenital Alexandra Isolated, no further workup   - Sickle Cell Screen: Neg  - Urine protein/Cr: <6.8/65.3  - PAP: NIL HRHPV negative  - Influenza vaccine date: Informed refusal  - BTL desired: No  - Aspirin: Prescribed Indications: chronic htn, Age >/= 35 years     15-20 Weeks Lab: (2025)  - Quad Screen: normal risk    28 Week Labs: (Date)  - 1H GTT:   - Rhogam (N/A if not applicable):   - Date of Tdap:   - CBC H/H:   - RPR:   -  Consent Form Signed (N/A if not applicable):   - BTL consent:   - [facility] for pediatric care:     37 Week Labs: (Date)  - CBC H/H:   - RPR:   - GBS Culture:   - HIV:   - Cervical GC:   - Cervical Exam:     38 Weeks:  -Cervical Exam:     39 Weeks:  -Cervical Exam:  -NST Exam:    FM Continuity Patient: no  Previous : no    Scheduled delivery: induction or  (can NOT be scheduled any earlier than 39w0d):    Imaging    Initial US: 25  Single intrauterine pregnancy with a fetal heart rate of 162 beats per minute. Estimated age by ultrasound 16 weeks and 5 days +/-1 week 1 day. Estimated date of delivery by ultrasound 2025    Anatomy Scan: 3/11/25  A detailed fetal anatomic ultrasound examination was performed for the following high risk indication: AMA, absent nasal bone.   Absent nasal bone is suspected on US today and no other abnormalities are seen. Fetal imaging is incomplete today.   A follow-up study will be scheduled to complete the fetal anatomic survey.   Fetal size today is consistent with established gestational age.   Transvaginal cervical length measures 2.7cm without funneling.   Placental location is anterior without evidence of previa.   MCA Dopplers are normal.     Assessment     1. 20 weeks gestation of pregnancy    2. - Chronic hypertension affecting pregnancy    3. 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 - Congenital hypoplasia of nasal bone    5. -  Short cervical length during pregnancy in second trimester    6. - History of maternal syphilis, currently pregnant        Plan     - OB Protocol discussed  - PNVs  - Continue ASA 81 mg daily  - Urine dip reviewed as above  - Routine labs: as above, reviewed. Plan to repeat Anti-D titers every 4 weeks and repeat RPR in third trimester.  - Mother plans to bottle feed  - Postpartum contraception discussion: desires IUD  - Keep appt/US with MFM   - Blood pressures at goal with current medication regimen. Continue Procardia 60 mg BID and labetalol 200 mg BID. Instructed to keep BP log and upload to portal. Call with any persistently elevated BP or present to OB ED for any severe range BP  - ED precautions discussed: vaginal bleeding or leaking fluid, belly cramping or pain, SOB/chest pain, RUQ pain, swelling of the face/lower extremities, vision changes. If don't feel the baby move in over an hour. Severe headaches that are not resolved with medication.       Return to clinic in 4 weeks for follow up.      Grecia Fritz MD  [facility], HO[facility]2

## 2025-03-19 ENCOUNTER — PROCEDURE VISIT (OUTPATIENT)
Dept: MATERNAL FETAL MEDICINE | Facility: CLINIC | Age: 35
End: 2025-03-19
Payer: MEDICAID

## 2025-03-19 ENCOUNTER — OFFICE VISIT (OUTPATIENT)
Dept: MATERNAL FETAL MEDICINE | Facility: CLINIC | Age: 35
End: 2025-03-19
Payer: MEDICAID

## 2025-03-19 VITALS
BODY MASS INDEX: 30.76 KG/M2 | HEART RATE: 91 BPM | DIASTOLIC BLOOD PRESSURE: 76 MMHG | HEIGHT: 66 IN | WEIGHT: 191.38 LBS | SYSTOLIC BLOOD PRESSURE: 112 MMHG

## 2025-03-19 DIAGNOSIS — O26.872 SHORT CERVICAL LENGTH DURING PREGNANCY IN SECOND TRIMESTER: ICD-10-CM

## 2025-03-19 DIAGNOSIS — O09.522 MULTIGRAVIDA OF ADVANCED MATERNAL AGE IN SECOND TRIMESTER: ICD-10-CM

## 2025-03-19 DIAGNOSIS — O36.0120 ANTI-D ANTIBODIES PRESENT DURING PREGNANCY IN SECOND TRIMESTER, SINGLE OR UNSPECIFIED FETUS: ICD-10-CM

## 2025-03-19 DIAGNOSIS — O10.919 CHRONIC HYPERTENSION AFFECTING PREGNANCY: Primary | ICD-10-CM

## 2025-03-19 DIAGNOSIS — O09.899 HISTORY OF MATERNAL SYPHILIS, CURRENTLY PREGNANT: ICD-10-CM

## 2025-03-19 DIAGNOSIS — O10.919 CHRONIC HYPERTENSION AFFECTING PREGNANCY: ICD-10-CM

## 2025-03-19 DIAGNOSIS — Q75.8 CONGENITAL HYPOPLASIA OF NASAL BONE: ICD-10-CM

## 2025-03-19 NOTE — ASSESSMENT & PLAN NOTE
Previously counseled the patient on the relationship between maternal age and fetal aneuploidy.  Additionally, we discussed the association between advanced maternal age (AMA) and preeclampsia, gestational diabetes, and fetal growth restriction.    She completed a quad scren with low risk results.    Recommendations:  Detailed anatomic survey at 19-20 weeks  (started, some views are limited)

## 2025-03-19 NOTE — PROGRESS NOTES
"Maternal Fetal Medicine follow up consult    SUBJECTIVE:     Savanah Pang is a 34 y.o.  female with IUP at 21w0d who is seen in follow up consultation by MFM.  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  She filled Rx's for HTN regimen and is taking as prescribed. Feeling well without complaints.  Antibody titer completed (1).   Denies LOF, VB, contractions. Positive fetal movement.    Previous notes reviewed.   No changes to medical, surgical, family, social, or obstetric history.  Interval history since last MFM visit: see above  Medications reviewed.    Care team members:  [facility] - Primary OB     OBJECTIVE:   /76 (BP Location: Right arm, Patient Position: Sitting)   Pulse 91   Ht 5' 6" (1.676 m)   Wt 86.8 kg (191 lb 5.8 oz)   LMP 10/23/2024 (Exact Date)   Breastfeeding Unknown   BMI 30.89 kg/m²     Ultrasound performed. See viewpoint for full ultrasound report.    Transvaginal cervical length measures 4.1 cm without funneling.   Significantly improved appearance from last week.    ASSESSMENT/PLAN:     34 y.o.  female with IUP at 21w0d    - Chronic hypertension affecting pregnancy  Previously counseled the patient on maternal/fetal risks associated with CHTN during pregnancy. Risks include but not limited to fetal growth restriction, miscarriage, abruption, maternal end organ disease (renal failure, MI, and stroke),  delivery, development of superimposed preeclampsia, and eclampsia.     3/10/25- Currently taking Nifedipine 30mg XL BID and Labetalol 200mg BID  3/19/25- /76. Doing well without " complaints.     Recommendations (Please refer to [facility] guidelines):  -Continue aspirin 81 mg daily for preeclampsia risk reduction  -Continue current medications: Nifedipine to 60mg XL BID and Labetalol 200mg BID  -Baseline evaluation with primary OB:   24-hour urine protein or baseline P/C ratio, CMP, and CBC (completed)  Maternal EKG  Maternal ophthalmic evaluation  Maternal echocardiogram if HTN has been long-standing or EKG is abnormal  -Serial fetal growth ultrasounds every 4-6 weeks, beginning at 26-28 weeks.   -Continued close observation of patient's blood pressures. Avoid hypotension as this has been associated with uteroplacental insufficiency.  -In conjunction with the CHAP study recommendation for BP control: If BP is persistently >=140/90 antihypertensive medication is recommended with goal BP of 120-140/80-90.  -Weekly antepartum testing at 32 weeks (NST+AFV); twice weekly testing if control is poor, multiple comorbidities are present, or requires several medications for control   -Delivery timing:  No medications, no comorbid conditions: 39 0/7 - 39 6/7 weeks gestation  No medications, comorbid conditions: 38 0/7 - 38 6/7 weeks gestation  Controlled on single agent, no comorbid conditions: 38 0/7 - 38 6/7 weeks gestation  Controlled on single agent, comorbid conditions: 37 0/7 - 38 6/7 weeks gestation  Uncontrolled or requiring >= 2 medications: 36 0/7 - 37 6/7 weeks gestation    Comorbid conditions include BMI >= 40, diabetes, and complex medical condition associated with placental dysfunction (ie lupus or other vascular disease)  Delivery may be recommended earlier pending results of fetal growth ultrasounds, AFV assessment, or antepartum testing results.        - History of maternal syphilis, currently pregnant  Syphilis is a sexually and congenitally transmitted disease caused by the spirochete Treponema pallidum. Early infections can be found due to symptoms (such as chancre, skin rash,  lymphadenopathy, etc.) or due to recent seroconversion. Latent syphilis acquired within the preceding year is referred to as early latent syphilis; all other cases of latent syphilis are late latent syphilis or syphilis of unknown duration. T. pallidum can infect the central nervous system and result in neurosyphilis, which can occur at any stage of syphilis. Early neurologic clinical manifestations (i.e., cranial nerve dysfunction, meningitis, stroke, acute altered mental status, and auditory or ophthalmic abnormalities) are usually present within the first few months or years of infection. Late neurologic manifestations (i.e., tabes dorsalis and general paresis) occur 10-30 years after infection.     Received full treatment in 2017 (partner also). Repeat RPR in first trimester: nonreactive.     Recommendations  - recheck RPR in third trimester  - already treated with PCN   - serial growth u/s    - Short cervical length during pregnancy in second trimester  3/10/25- On today's ultrasound, a short cervix is identified. See previous documentation for specific counseling. CL 27mm. No funneling.  3/19/25- CL >30mm    Recommendations:  Repeat cervical length in 1 week  Avoid moderate to high impact exercise and heavy lifting  Strict PTL precautions reviewed with patient  If any concern for cervical dilation prior to 23 weeks and 6 days, and patient does not have PTL symptoms or significant bleeding, please contact [facility] for evaluation for physical exam indicated cerclage      - Multigravida of advanced maternal age in second trimester  Previously counseled the patient on the relationship between maternal age and fetal aneuploidy.  Additionally, we discussed the association between advanced maternal age (AMA) and preeclampsia, gestational diabetes, and fetal growth restriction.    She completed a quad scren with low risk results.    Recommendations:  Detailed anatomic survey at 19-20 weeks  (started, some views are  limited)    - Anti-D antibodies present during pregnancy in second trimester  There are > 50 different red cell antigens that have been associated with hemolytic disease of the fetus and  (HDFN), but the antibodies frequently associated with severe HDFN include those against RhD, Rhc, and [place] (K1). 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   We discussed the pathophysiology of antibody sensitization as well as the risk of fetal anemia and hydrops. HDFN does not usually occur until a critical titer is reached (1:16 at [facility]).     3/10/25- titer: 1    Recommendations:  FOB- Presumed positive at least heterozygous (he is father of all their children).  If the FOB is unknown, declines testing, FOB is homozygous for the antigen, or if the patient declines amniocentesis, recommend repeat antibody titer at 4-week intervals, using the same lab as the initial titers. If the patients antibody titer rises to a critical value of 1:16, there are two main options:   Politely declined amniocentesis.  Fetal surveillance by assessment of the MCA peak systolic velocity (PSV) every one to two weeks as determined by MFM.  If the MCA PSV reaches > 1.5 MoM, fetal blood sampling and transfusion may be necessary. This will be coordinated by MFM     After reaching the critical titer for MCA PSV screening, it is no longer necessary to perform serial antibody titers.  If the fetus is at risk for HDFN (i.e.: undergoing MCA surveillance)  Weekly  testing starting at 32 weeks  Delivery between 37 0/7 - 37 6/7 weeks if no transfusion needed.   Individualization of delivery timing would occur if transfusion is required. Because data are limited regarding timing of delivery, each case will be individualized by the MFM team.  If critical titer is not found, delivery at 39 weeks may be undertaken with no PNT indicated.  Consider maternal blood cross-matching at admission for delivery (if rare antibody and increased risk of  hemorrhage) due to potential delay in finding matched units.     Please note that this management scheme assumes that the patients partner is indeed the father of the baby.        - Congenital hypoplasia of nasal bone  A hypoplastic nasal bone is associated with an increased risk of Down syndrome when isolated. It increases the chance of Down Syndrome 6.6 times. It is estimated that 0.1-1.2% of euploid fetuses have a hypoplastic nasal bone. Hypoplasia of the nasal bone also appears to be more common in fetuses of Afro-Michael women (up to 9%) than those of  women (0.5%). The identification of an absent or hypoplastic nasal bone can be associated with Down syndrome or may be a normal variant. Assuming there is not an underlying chromosomal abnormality, genetic syndrome, or other fetal abnormalities, hypoplastic or absent nasal bone is most likely a normal variant. After a finding of isolated absent nasal bone on ultrasound cfDNA screening vs amniocentesis are recommended. If cfDNA has already been completed and is normal no further testing is recommended.    Two different categories of tests are available in pregnancy to assess for fetal chromosomal abnormalities after a finding of hypoplastic or absent nasal bone, screening tests and diagnostic tests. Screening tests do not provide a definitive answer as to whether a fetus is affected with a chromosome disorder. Rather, screening tests indicate an increased or decreased chance of whether a pregnancy is affected by a condition. cfDNA screening is a recommended option after an isolated absent nasal bone is identified on ultrasound. In all people, small pieces of genetic material (DNA) that are not contained within cells are present in the blood stream. During pregnancy, these small pieces of DNA are a mixture of the mother's DNA and DNA shed from the placenta. This test can indicate if there is an abnormal number of chromosomes 21, 18, 13, X, and Y. The  approximate detection rate is 99% for Trisomy 21, 98% for Trisomy 18, 91% for Trisomy 13, and 90% for Monroe Syndrome. The false positive rates are low ranging from 1/1000 to 2/1000.    Screening tests are not definitive. If the test indicates an increased risk for a chromosome problem, it is recommended to confirm with a diagnostic test such as amniocentesis or CVS. Alternatively, a low risk or negative result on a screening test is reassuring, but it does not eliminate the possibility that the fetus is affected with the condition.    We then reviewed diagnostic testing options. Unlike screening tests, diagnostic tests provide definitive answers regarding the presence of fetal chromosome abnormalities. In addition to assessing for the presence of the common fetal aneuploidies, diagnostic tests can assess for abnormalities in the number and structure of all chromosomes. While both procedures are generally quite safe, there is a risk of miscarriage associated with these procedures. For genetic amniocentesis, the estimated risk of miscarriage attributable to the procedure is 1 in 900.    At the end of our consultation, the patient politely declined further invasive testing given negative quad screen.       F/u in 2 weeks for cervical length only  F/u in 3 weeks for MFM visit /growth ultrasound    Margarette Valdez MD  Maternal Fetal Medicine

## 2025-03-19 NOTE — ASSESSMENT & PLAN NOTE
There are > 50 different red cell antigens that have been associated with hemolytic disease of the fetus and  (HDFN), but the antibodies frequently associated with severe HDFN include those against RhD, Rhc, and [place] (K1). 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   We discussed the pathophysiology of antibody sensitization as well as the risk of fetal anemia and hydrops. HDFN does not usually occur until a critical titer is reached (1:16 at [facility]).     3/10/25- titer: 1    Recommendations:  FOB- Presumed positive at least heterozygous (he is father of all their children).  If the FOB is unknown, declines testing, FOB is homozygous for the antigen, or if the patient declines amniocentesis, recommend repeat antibody titer at 4-week intervals, using the same lab as the initial titers. If the patients antibody titer rises to a critical value of 1:16, there are two main options:   Politely declined amniocentesis.  Fetal surveillance by assessment of the MCA peak systolic velocity (PSV) every one to two weeks as determined by MFM.  If the MCA PSV reaches > 1.5 MoM, fetal blood sampling and transfusion may be necessary. This will be coordinated by MF     After reaching the critical titer for MCA PSV screening, it is no longer necessary to perform serial antibody titers.  If the fetus is at risk for HDFN (i.e.: undergoing MCA surveillance)  Weekly  testing starting at 32 weeks  Delivery between 37 0/7 - 37 6/7 weeks if no transfusion needed.   Individualization of delivery timing would occur if transfusion is required. Because data are limited regarding timing of delivery, each case will be individualized by the MFM team.  If critical titer is not found, delivery at 39 weeks may be undertaken with no PNT indicated.  Consider maternal blood cross-matching at admission for delivery (if rare antibody and increased risk of hemorrhage) due to potential delay in finding matched units.     Please note that  this management scheme assumes that the patients partner is indeed the father of the baby.

## 2025-03-19 NOTE — ASSESSMENT & PLAN NOTE
Previously counseled the patient on maternal/fetal risks associated with CHTN during pregnancy. Risks include but not limited to fetal growth restriction, miscarriage, abruption, maternal end organ disease (renal failure, MI, and stroke),  delivery, development of superimposed preeclampsia, and eclampsia.     3/10/25- Currently taking Nifedipine 30mg XL BID and Labetalol 200mg BID  3/19/25- /76. Doing well without complaints.     Recommendations (Please refer to [facility] guidelines):  -Continue aspirin 81 mg daily for preeclampsia risk reduction  -Continue current medications: Nifedipine to 60mg XL BID and Labetalol 200mg BID  -Baseline evaluation with primary OB:   24-hour urine protein or baseline P/C ratio, CMP, and CBC (completed)  Maternal EKG  Maternal ophthalmic evaluation  Maternal echocardiogram if HTN has been long-standing or EKG is abnormal  -Serial fetal growth ultrasounds every 4-6 weeks, beginning at 26-28 weeks.   -Continued close observation of patient's blood pressures. Avoid hypotension as this has been associated with uteroplacental insufficiency.  -In conjunction with the CHAP study recommendation for BP control: If BP is persistently >=140/90 antihypertensive medication is recommended with goal BP of 120-140/80-90.  -Weekly antepartum testing at 32 weeks (NST+AFV); twice weekly testing if control is poor, multiple comorbidities are present, or requires several medications for control   -Delivery timing:  No medications, no comorbid conditions: 39 0/7 - 39 6/7 weeks gestation  No medications, comorbid conditions: 38 0/7 - 38 6/7 weeks gestation  Controlled on single agent, no comorbid conditions: 38 0/7 - 38 6/7 weeks gestation  Controlled on single agent, comorbid conditions: 37 0/7 - 38 6/7 weeks gestation  Uncontrolled or requiring >= 2 medications: 36 0/7 - 37 6/7 weeks gestation    Comorbid conditions include BMI >= 40, diabetes, and complex medical condition associated with  placental dysfunction (ie lupus or other vascular disease)  Delivery may be recommended earlier pending results of fetal growth ultrasounds, AFV assessment, or antepartum testing results.

## 2025-03-19 NOTE — ASSESSMENT & PLAN NOTE
3/10/25- On today's ultrasound, a short cervix is identified. See previous documentation for specific counseling. CL 27mm. No funneling.  3/19/25- CL >30mm    Recommendations:  Repeat cervical length in 1 week  Avoid moderate to high impact exercise and heavy lifting  Strict PTL precautions reviewed with patient  If any concern for cervical dilation prior to 23 weeks and 6 days, and patient does not have PTL symptoms or significant bleeding, please contact M for evaluation for physical exam indicated cerclage

## 2025-03-20 NOTE — PROGRESS NOTES
I reviewed History, PE, A/P and medical record.  Services provided in outpatient department of a teaching hospital/facility, I was immediately available.  I agree with resident. Care provided was reasonable and necessary.   I discussed the patient with resident at time of visit. Case Discussed with OB Staff/Dr. Chaves.

## 2025-04-02 ENCOUNTER — PROCEDURE VISIT (OUTPATIENT)
Dept: MATERNAL FETAL MEDICINE | Facility: CLINIC | Age: 35
End: 2025-04-02
Payer: MEDICAID

## 2025-04-02 ENCOUNTER — OFFICE VISIT (OUTPATIENT)
Dept: MATERNAL FETAL MEDICINE | Facility: CLINIC | Age: 35
End: 2025-04-02
Payer: MEDICAID

## 2025-04-02 VITALS
HEIGHT: 66 IN | HEART RATE: 91 BPM | BODY MASS INDEX: 30.76 KG/M2 | DIASTOLIC BLOOD PRESSURE: 73 MMHG | SYSTOLIC BLOOD PRESSURE: 111 MMHG | WEIGHT: 191.38 LBS

## 2025-04-02 DIAGNOSIS — O09.522 MULTIGRAVIDA OF ADVANCED MATERNAL AGE IN SECOND TRIMESTER: ICD-10-CM

## 2025-04-02 DIAGNOSIS — O09.899 HISTORY OF MATERNAL SYPHILIS, CURRENTLY PREGNANT: ICD-10-CM

## 2025-04-02 DIAGNOSIS — O10.919 CHRONIC HYPERTENSION AFFECTING PREGNANCY: Primary | ICD-10-CM

## 2025-04-02 DIAGNOSIS — Q75.8 CONGENITAL HYPOPLASIA OF NASAL BONE: ICD-10-CM

## 2025-04-02 DIAGNOSIS — O36.0120 ANTI-D ANTIBODIES PRESENT DURING PREGNANCY IN SECOND TRIMESTER, SINGLE OR UNSPECIFIED FETUS: ICD-10-CM

## 2025-04-02 DIAGNOSIS — O26.872 SHORT CERVICAL LENGTH DURING PREGNANCY IN SECOND TRIMESTER: ICD-10-CM

## 2025-04-02 DIAGNOSIS — O10.919 CHRONIC HYPERTENSION AFFECTING PREGNANCY: ICD-10-CM

## 2025-04-02 PROCEDURE — 3078F DIAST BP <80 MM HG: CPT | Mod: CPTII,S$GLB,, | Performed by: NURSE PRACTITIONER

## 2025-04-02 PROCEDURE — 1160F RVW MEDS BY RX/DR IN RCRD: CPT | Mod: CPTII,S$GLB,, | Performed by: NURSE PRACTITIONER

## 2025-04-02 PROCEDURE — 1159F MED LIST DOCD IN RCRD: CPT | Mod: CPTII,S$GLB,, | Performed by: NURSE PRACTITIONER

## 2025-04-02 PROCEDURE — 99212 OFFICE O/P EST SF 10 MIN: CPT | Mod: TH,S$GLB,, | Performed by: NURSE PRACTITIONER

## 2025-04-02 PROCEDURE — 76815 OB US LIMITED FETUS(S): CPT | Mod: S$GLB,,, | Performed by: OBSTETRICS & GYNECOLOGY

## 2025-04-02 PROCEDURE — 3074F SYST BP LT 130 MM HG: CPT | Mod: CPTII,S$GLB,, | Performed by: NURSE PRACTITIONER

## 2025-04-02 PROCEDURE — 76817 TRANSVAGINAL US OBSTETRIC: CPT | Mod: S$GLB,,, | Performed by: OBSTETRICS & GYNECOLOGY

## 2025-04-02 PROCEDURE — 3008F BODY MASS INDEX DOCD: CPT | Mod: CPTII,S$GLB,, | Performed by: NURSE PRACTITIONER

## 2025-04-02 NOTE — ASSESSMENT & PLAN NOTE
Previously counseled the patient on maternal/fetal risks associated with CHTN during pregnancy. Risks include but not limited to fetal growth restriction, miscarriage, abruption, maternal end organ disease (renal failure, MI, and stroke),  delivery, development of superimposed preeclampsia, and eclampsia.     3/10/25- Currently taking Nifedipine 30mg XL BID and Labetalol 200mg BID  3/19/25- /76. Doing well without complaints.   25-BP has been well controlled since adjusting Nifedipine on 3/10. BP today 111/73    Recommendations (Please refer to [facility] guidelines):  -Continue aspirin 81 mg daily for preeclampsia risk reduction  -Continue current medications: Nifedipine to 60mg XL BID and Labetalol 200mg BID  -Baseline evaluation with primary OB:   24-hour urine protein or baseline P/C ratio, CMP, and CBC (completed)  Maternal EKG  Maternal ophthalmic evaluation  Maternal echocardiogram if HTN has been long-standing or EKG is abnormal  -Serial fetal growth ultrasounds every 4-6 weeks, beginning at 26-28 weeks.   -Continued close observation of patient's blood pressures. Avoid hypotension as this has been associated with uteroplacental insufficiency.  -In conjunction with the CHAP study recommendation for BP control: If BP is persistently >=140/90 antihypertensive medication is recommended with goal BP of 120-140/80-90.  -Weekly antepartum testing at 32 weeks (NST+AFV); twice weekly testing if control is poor, multiple comorbidities are present, or requires several medications for control   -Delivery timing:  No medications, no comorbid conditions: 39 0/7 - 39 6/7 weeks gestation  No medications, comorbid conditions: 38 0/7 - 38 6/7 weeks gestation  Controlled on single agent, no comorbid conditions: 38 0/7 - 38 6/7 weeks gestation  Controlled on single agent, comorbid conditions: 37 0/7 - 38 6/7 weeks gestation  Uncontrolled or requiring >= 2 medications: 36 0/7 - 37 6/7 weeks  gestation    Comorbid conditions include BMI >= 40, diabetes, and complex medical condition associated with placental dysfunction (ie lupus or other vascular disease)  Delivery may be recommended earlier pending results of fetal growth ultrasounds, AFV assessment, or antepartum testing results.

## 2025-04-02 NOTE — ASSESSMENT & PLAN NOTE
Previously counseled the patient on the relationship between maternal age and fetal aneuploidy.  Additionally, we discussed the association between advanced maternal age (AMA) and preeclampsia, gestational diabetes, and fetal growth restriction.    She completed a quad scren with low risk results.    Recommendations:  Detailed anatomic survey at 19-20 weeks

## 2025-04-02 NOTE — ASSESSMENT & PLAN NOTE
3/10/25- On today's ultrasound, a short cervix is identified. See previous documentation for specific counseling. CL 27mm. No funneling.  3/19/25- CL >30mm  4/2/25- CL >30mm    Recommendations:  Given current gestational age of 23w, no further cervical length screenings recommended.  Avoid moderate to high impact exercise and heavy lifting  Strict PTL precautions reviewed with patient  If any concern for cervical dilation prior to 23 weeks and 6 days, and patient does not have PTL symptoms or significant bleeding, please contact M for evaluation for physical exam indicated cerclage

## 2025-04-02 NOTE — PROGRESS NOTES
"Maternal Fetal Medicine follow up consult    SUBJECTIVE:     Savanah Pang is a 34 y.o.  female with IUP at 23w0d who is seen in follow up consultation by [facility].  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  She is doing well without complaints - denies vaginal pressure, pain, discharge.  Denies LOF, VB, contractions. Positive fetal movement.    Previous notes reviewed.   No changes to medical, surgical, family, social, or obstetric history.  Interval history since last [facility] visit: see above  Medications reviewed.    Care team members:  [facility] - Primary OB     OBJECTIVE:   /73 (BP Location: Right arm)   Pulse 91   Ht 5' 6" (1.676 m)   Wt 86.8 kg (191 lb 5.8 oz)   LMP 10/23/2024 (Exact Date)   Breastfeeding Unknown   BMI 30.89 kg/m²     Ultrasound performed. See viewpoint for full ultrasound report.    ASSESSMENT/PLAN:     34 y.o.  female with IUP at 23w0d    - Chronic hypertension affecting pregnancy  Previously counseled the patient on maternal/fetal risks associated with CHTN during pregnancy. Risks include but not limited to fetal growth restriction, miscarriage, abruption, maternal end organ disease (renal failure, MI, and stroke),  delivery, development of superimposed preeclampsia, and eclampsia.     3/10/25- Currently taking Nifedipine 30mg XL BID and Labetalol 200mg BID  3/19/25- /76. Doing well without complaints.   25-BP has been well controlled since adjusting Nifedipine on 3/10. BP today 111/73    Recommendations (Please refer to [facility] guidelines):  -Continue aspirin 81 mg daily for " preeclampsia risk reduction  -Continue current medications: Nifedipine to 60mg XL BID and Labetalol 200mg BID  -Baseline evaluation with primary OB:   24-hour urine protein or baseline P/C ratio, CMP, and CBC (completed)  Maternal EKG  Maternal ophthalmic evaluation  Maternal echocardiogram if HTN has been long-standing or EKG is abnormal  -Serial fetal growth ultrasounds every 4-6 weeks, beginning at 26-28 weeks.   -Continued close observation of patient's blood pressures. Avoid hypotension as this has been associated with uteroplacental insufficiency.  -In conjunction with the CHAP study recommendation for BP control: If BP is persistently >=140/90 antihypertensive medication is recommended with goal BP of 120-140/80-90.  -Weekly antepartum testing at 32 weeks (NST+AFV); twice weekly testing if control is poor, multiple comorbidities are present, or requires several medications for control   -Delivery timing:  No medications, no comorbid conditions: 39 0/7 - 39 6/7 weeks gestation  No medications, comorbid conditions: 38 0/7 - 38 6/7 weeks gestation  Controlled on single agent, no comorbid conditions: 38 0/7 - 38 6/7 weeks gestation  Controlled on single agent, comorbid conditions: 37 0/7 - 38 6/7 weeks gestation  Uncontrolled or requiring >= 2 medications: 36 0/7 - 37 6/7 weeks gestation    Comorbid conditions include BMI >= 40, diabetes, and complex medical condition associated with placental dysfunction (ie lupus or other vascular disease)  Delivery may be recommended earlier pending results of fetal growth ultrasounds, AFV assessment, or antepartum testing results.        - History of maternal syphilis, currently pregnant  Syphilis is a sexually and congenitally transmitted disease caused by the spirochete Treponema pallidum. Early infections can be found due to symptoms (such as chancre, skin rash, lymphadenopathy, etc.) or due to recent seroconversion. Latent syphilis acquired within the preceding year is  referred to as early latent syphilis; all other cases of latent syphilis are late latent syphilis or syphilis of unknown duration. T. pallidum can infect the central nervous system and result in neurosyphilis, which can occur at any stage of syphilis. Early neurologic clinical manifestations (i.e., cranial nerve dysfunction, meningitis, stroke, acute altered mental status, and auditory or ophthalmic abnormalities) are usually present within the first few months or years of infection. Late neurologic manifestations (i.e., tabes dorsalis and general paresis) occur 10-30 years after infection.     Received full treatment in 2017 (partner also). Repeat RPR in first trimester: nonreactive.     Recommendations  - recheck RPR in third trimester  - already treated with PCN   - serial growth u/s    - Short cervical length during pregnancy in second trimester  3/10/25- On today's ultrasound, a short cervix is identified. See previous documentation for specific counseling. CL 27mm. No funneling.  3/19/25- CL >30mm  4/2/25- CL >30mm    Recommendations:  Given current gestational age of 23w, no further cervical length screenings recommended.  Avoid moderate to high impact exercise and heavy lifting  Strict PTL precautions reviewed with patient  If any concern for cervical dilation prior to 23 weeks and 6 days, and patient does not have PTL symptoms or significant bleeding, please contact [facility] for evaluation for physical exam indicated cerclage      - Multigravida of advanced maternal age in second trimester  Previously counseled the patient on the relationship between maternal age and fetal aneuploidy.  Additionally, we discussed the association between advanced maternal age (AMA) and preeclampsia, gestational diabetes, and fetal growth restriction.    She completed a quad scren with low risk results.    Recommendations:  Detailed anatomic survey at 19-20 weeks      - Anti-D antibodies present during pregnancy in second  trimester  There are > 50 different red cell antigens that have been associated with hemolytic disease of the fetus and  (HDFN), but the antibodies frequently associated with severe HDFN include those against RhD, Rhc, and [place] (K1). 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   We discussed the pathophysiology of antibody sensitization as well as the risk of fetal anemia and hydrops. HDFN does not usually occur until a critical titer is reached (1:16 at [facility]).     3/10/25- titer: 1  25- Repeat titer ordered.    Recommendations:  FOB- Presumed positive at least heterozygous (he is father of all their children).  If the FOB is unknown, declines testing, FOB is homozygous for the antigen, or if the patient declines amniocentesis, recommend repeat antibody titer at 4-week intervals, using the same lab as the initial titers. If the patients antibody titer rises to a critical value of 1:16, there are two main options:   Politely declined amniocentesis.  Fetal surveillance by assessment of the MCA peak systolic velocity (PSV) every one to two weeks as determined by MFM.  If the MCA PSV reaches > 1.5 MoM, fetal blood sampling and transfusion may be necessary. This will be coordinated by MFM     After reaching the critical titer for MCA PSV screening, it is no longer necessary to perform serial antibody titers.  If the fetus is at risk for HDFN (i.e.: undergoing MCA surveillance)  Weekly  testing starting at 32 weeks  Delivery between 37 0/7 - 37 6/7 weeks if no transfusion needed.   Individualization of delivery timing would occur if transfusion is required. Because data are limited regarding timing of delivery, each case will be individualized by the MFM team.  If critical titer is not found, delivery at 39 weeks may be undertaken with no PNT indicated.  Consider maternal blood cross-matching at admission for delivery (if rare antibody and increased risk of hemorrhage) due to potential delay in  finding matched units.     Please note that this management scheme assumes that the patients partner is indeed the father of the baby.        - Congenital hypoplasia of nasal bone  A hypoplastic nasal bone is associated with an increased risk of Down syndrome when isolated. It increases the chance of Down Syndrome 6.6 times. It is estimated that 0.1-1.2% of euploid fetuses have a hypoplastic nasal bone. Hypoplasia of the nasal bone also appears to be more common in fetuses of Afro-Michael women (up to 9%) than those of  women (0.5%). The identification of an absent or hypoplastic nasal bone can be associated with Down syndrome or may be a normal variant. Assuming there is not an underlying chromosomal abnormality, genetic syndrome, or other fetal abnormalities, hypoplastic or absent nasal bone is most likely a normal variant. After a finding of isolated absent nasal bone on ultrasound cfDNA screening vs amniocentesis are recommended. If cfDNA has already been completed and is normal no further testing is recommended.    Two different categories of tests are available in pregnancy to assess for fetal chromosomal abnormalities after a finding of hypoplastic or absent nasal bone, screening tests and diagnostic tests. Screening tests do not provide a definitive answer as to whether a fetus is affected with a chromosome disorder. Rather, screening tests indicate an increased or decreased chance of whether a pregnancy is affected by a condition. cfDNA screening is a recommended option after an isolated absent nasal bone is identified on ultrasound. In all people, small pieces of genetic material (DNA) that are not contained within cells are present in the blood stream. During pregnancy, these small pieces of DNA are a mixture of the mother's DNA and DNA shed from the placenta. This test can indicate if there is an abnormal number of chromosomes 21, 18, 13, X, and Y. The approximate detection rate is 99% for  Trisomy 21, 98% for Trisomy 18, 91% for Trisomy 13, and 90% for Monroe Syndrome. The false positive rates are low ranging from 1/1000 to 2/1000.    Screening tests are not definitive. If the test indicates an increased risk for a chromosome problem, it is recommended to confirm with a diagnostic test such as amniocentesis or CVS. Alternatively, a low risk or negative result on a screening test is reassuring, but it does not eliminate the possibility that the fetus is affected with the condition.    We then reviewed diagnostic testing options. Unlike screening tests, diagnostic tests provide definitive answers regarding the presence of fetal chromosome abnormalities. In addition to assessing for the presence of the common fetal aneuploidies, diagnostic tests can assess for abnormalities in the number and structure of all chromosomes. While both procedures are generally quite safe, there is a risk of miscarriage associated with these procedures. For genetic amniocentesis, the estimated risk of miscarriage attributable to the procedure is 1 in 900.    At the end of our consultation, the patient politely declined further invasive testing given negative quad screen.       F/u in 1 week for [facility] visit /growth ultrasound

## 2025-04-02 NOTE — ASSESSMENT & PLAN NOTE
There are > 50 different red cell antigens that have been associated with hemolytic disease of the fetus and  (HDFN), but the antibodies frequently associated with severe HDFN include those against RhD, Rhc, and [place] (K1). 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   We discussed the pathophysiology of antibody sensitization as well as the risk of fetal anemia and hydrops. HDFN does not usually occur until a critical titer is reached (1:16 at [facility]).     3/10/25- titer: 1  25- Repeat titer ordered.    Recommendations:  FOB- Presumed positive at least heterozygous (he is father of all their children).  If the FOB is unknown, declines testing, FOB is homozygous for the antigen, or if the patient declines amniocentesis, recommend repeat antibody titer at 4-week intervals, using the same lab as the initial titers. If the patients antibody titer rises to a critical value of 1:16, there are two main options:   Politely declined amniocentesis.  Fetal surveillance by assessment of the MCA peak systolic velocity (PSV) every one to two weeks as determined by MFM.  If the MCA PSV reaches > 1.5 MoM, fetal blood sampling and transfusion may be necessary. This will be coordinated by MFM     After reaching the critical titer for MCA PSV screening, it is no longer necessary to perform serial antibody titers.  If the fetus is at risk for HDFN (i.e.: undergoing MCA surveillance)  Weekly  testing starting at 32 weeks  Delivery between 37 0/7 - 37 6/7 weeks if no transfusion needed.   Individualization of delivery timing would occur if transfusion is required. Because data are limited regarding timing of delivery, each case will be individualized by the MFM team.  If critical titer is not found, delivery at 39 weeks may be undertaken with no PNT indicated.  Consider maternal blood cross-matching at admission for delivery (if rare antibody and increased risk of hemorrhage) due to potential delay in finding  matched units.     Please note that this management scheme assumes that the patients partner is indeed the father of the baby.

## 2025-04-03 NOTE — ASSESSMENT & PLAN NOTE
3/10/25- On today's ultrasound, a short cervix is identified. See previous documentation for specific counseling. CL 27mm. No funneling.  3/19/25- CL >30mm  4/2/25- CL 29mm  4/7/25- CL stable. No further assessments recommended.    Recommendations:  Given current gestational age of 23w, no further cervical length screenings recommended.  Avoid moderate to high impact exercise and heavy lifting  Strict PTL precautions reviewed with patient

## 2025-04-03 NOTE — ASSESSMENT & PLAN NOTE
There are > 50 different red cell antigens that have been associated with hemolytic disease of the fetus and  (HDFN), but the antibodies frequently associated with severe HDFN include those against RhD, Rhc, and [place] (K1). 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   We discussed the pathophysiology of antibody sensitization as well as the risk of fetal anemia and hydrops. HDFN does not usually occur until a critical titer is reached (1:16 at [facility]).     3/10/25- titer: 1  25- Repeat titer ordered.    Recommendations:  FOB- Presumed positive at least heterozygous (he is father of all their children).  If the FOB is unknown, declines testing, FOB is homozygous for the antigen, or if the patient declines amniocentesis, recommend repeat antibody titer at 4-week intervals, using the same lab as the initial titers. If the patients antibody titer rises to a critical value of 1:16, there are two main options:   Politely declined amniocentesis.  Fetal surveillance by assessment of the MCA peak systolic velocity (PSV) every one to two weeks as determined by MFM.  If the MCA PSV reaches > 1.5 MoM, fetal blood sampling and transfusion may be necessary. This will be coordinated by MFM     After reaching the critical titer for MCA PSV screening, it is no longer necessary to perform serial antibody titers.  If the fetus is at risk for HDFN (i.e.: undergoing MCA surveillance)  Weekly  testing starting at 32 weeks  Delivery between 37 0/7 - 37 6/7 weeks if no transfusion needed.   Individualization of delivery timing would occur if transfusion is required. Because data are limited regarding timing of delivery, each case will be individualized by the MFM team.  If critical titer is not found, delivery at 39 weeks may be undertaken with no PNT indicated.  Consider maternal blood cross-matching at admission for delivery (if rare antibody and increased risk of hemorrhage) due to potential delay in finding  matched units.     Please note that this management scheme assumes that the patients partner is indeed the father of the baby.

## 2025-04-03 NOTE — ASSESSMENT & PLAN NOTE
Previously counseled the patient on maternal/fetal risks associated with CHTN during pregnancy. Risks include but not limited to fetal growth restriction, miscarriage, abruption, maternal end organ disease (renal failure, MI, and stroke),  delivery, development of superimposed preeclampsia, and eclampsia.     3/10/25- Currently taking Nifedipine 30mg XL BID and Labetalol 200mg BID  3/19/25- /76. Doing well without complaints.   25-BP has been well controlled since adjusting Nifedipine on 3/10. BP today 111/73    Recommendations ([facility]):  -Continue aspirin 81 mg daily for preeclampsia risk reduction  -Continue current medications: Nifedipine to 60mg XL BID and Labetalol 200mg BID  -Baseline evaluation with primary OB:   24-hour urine protein or baseline P/C ratio, CMP, and CBC (completed)  Maternal EKG  Maternal ophthalmic evaluation  Maternal echocardiogram if HTN has been long-standing or EKG is abnormal  -Serial fetal growth ultrasounds every 4-6 weeks, beginning at 26-28 weeks.   -Continued close observation of patient's blood pressures. Avoid hypotension as this has been associated with uteroplacental insufficiency.  -In conjunction with the CHAP study recommendation for BP control: If BP is persistently >=140/90 antihypertensive medication is recommended with goal BP of 120-140/80-90.  -Weekly antepartum testing at 32 weeks (NST+AFV); twice weekly testing if control is poor, multiple comorbidities are present, or requires several medications for control   -Delivery timing:  No medications, no comorbid conditions: 39 0/7 - 39 6/7 weeks gestation  No medications, comorbid conditions: 38 0/7 - 38 6/7 weeks gestation  Controlled on single agent, no comorbid conditions: 38 0/7 - 38 6/7 weeks gestation  Controlled on single agent, comorbid conditions: 37 0/7 - 38 6/7 weeks gestation  Uncontrolled or requiring >= 2 medications: 36 0/7 - 37 6/7 weeks  gestation    Comorbid conditions include BMI >= 40, diabetes, and complex medical condition associated with placental dysfunction (ie lupus or other vascular disease)  Delivery may be recommended earlier pending results of fetal growth ultrasounds, AFV assessment, or antepartum testing results.

## 2025-04-03 NOTE — PROGRESS NOTES
"Maternal Fetal Medicine follow up consult    SUBJECTIVE:     Savanah aPng is a 34 y.o.  female with IUP at 23w5d who is seen in follow up consultation by [facility].  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  Due for repeat titer - ordered.  Denies LOF, VB, contractions. Positive fetal movement.    Previous notes reviewed.   No changes to medical, surgical, family, social, or obstetric history.  Interval history since last [facility] visit: see above  Medications reviewed.    Care team members:  [facility] - Primary OB     OBJECTIVE:   /74 (BP Location: Right arm, Patient Position: Sitting)   Pulse 81   Ht 5' 6" (1.676 m)   Wt 87.1 kg (192 lb 2.1 oz)   LMP 10/23/2024 (Exact Date)   Breastfeeding Unknown   BMI 31.01 kg/m²     Ultrasound performed. See viewpoint for full ultrasound report.    ASSESSMENT/PLAN:     34 y.o.  female with IUP at 23w5d    - Chronic hypertension affecting pregnancy  Previously counseled the patient on maternal/fetal risks associated with CHTN during pregnancy. Risks include but not limited to fetal growth restriction, miscarriage, abruption, maternal end organ disease (renal failure, MI, and stroke),  delivery, development of superimposed preeclampsia, and eclampsia.     3/10/25- Currently taking Nifedipine 30mg XL BID and Labetalol 200mg BID  3/19/25- /76. Doing well without complaints.   25-BP has been well controlled since adjusting Nifedipine on 3/10. BP today 111/73    Recommendations (Please refer to [facility] guidelines):  -Continue aspirin 81 mg daily for preeclampsia risk " reduction  -Continue current medications: Nifedipine to 60mg XL BID and Labetalol 200mg BID  -Baseline evaluation with primary OB:   24-hour urine protein or baseline P/C ratio, CMP, and CBC (completed)  Maternal EKG  Maternal ophthalmic evaluation  Maternal echocardiogram if HTN has been long-standing or EKG is abnormal  -Serial fetal growth ultrasounds every 4-6 weeks, beginning at 26-28 weeks.   -Continued close observation of patient's blood pressures. Avoid hypotension as this has been associated with uteroplacental insufficiency.  -In conjunction with the CHAP study recommendation for BP control: If BP is persistently >=140/90 antihypertensive medication is recommended with goal BP of 120-140/80-90.  -Weekly antepartum testing at 32 weeks (NST+AFV); twice weekly testing if control is poor, multiple comorbidities are present, or requires several medications for control   -Delivery timing:  No medications, no comorbid conditions: 39 0/7 - 39 6/7 weeks gestation  No medications, comorbid conditions: 38 0/7 - 38 6/7 weeks gestation  Controlled on single agent, no comorbid conditions: 38 0/7 - 38 6/7 weeks gestation  Controlled on single agent, comorbid conditions: 37 0/7 - 38 6/7 weeks gestation  Uncontrolled or requiring >= 2 medications: 36 0/7 - 37 6/7 weeks gestation    Comorbid conditions include BMI >= 40, diabetes, and complex medical condition associated with placental dysfunction (ie lupus or other vascular disease)  Delivery may be recommended earlier pending results of fetal growth ultrasounds, AFV assessment, or antepartum testing results.      - History of maternal syphilis, currently pregnant  Syphilis is a sexually and congenitally transmitted disease caused by the spirochete Treponema pallidum. Early infections can be found due to symptoms (such as chancre, skin rash, lymphadenopathy, etc.) or due to recent seroconversion. Latent syphilis acquired within the preceding year is referred to as early  latent syphilis; all other cases of latent syphilis are late latent syphilis or syphilis of unknown duration. T. pallidum can infect the central nervous system and result in neurosyphilis, which can occur at any stage of syphilis. Early neurologic clinical manifestations (i.e., cranial nerve dysfunction, meningitis, stroke, acute altered mental status, and auditory or ophthalmic abnormalities) are usually present within the first few months or years of infection. Late neurologic manifestations (i.e., tabes dorsalis and general paresis) occur 10-30 years after infection.     Received full treatment in 2017 (partner also). Repeat RPR in first trimester: nonreactive.     Recommendations  - recheck RPR in third trimester  - already treated with PCN   - serial growth u/s    - Short cervical length during pregnancy in second trimester  3/10/25- On today's ultrasound, a short cervix is identified. See previous documentation for specific counseling. CL 27mm. No funneling.  3/19/25- CL >30mm  25- CL 29mm  25- CL stable. No further assessments recommended.    Recommendations:  Given current gestational age of 23w, no further cervical length screenings recommended.  Avoid moderate to high impact exercise and heavy lifting  Strict PTL precautions reviewed with patient        - Multigravida of advanced maternal age in second trimester  Previously counseled the patient on the relationship between maternal age and fetal aneuploidy.  Additionally, we discussed the association between advanced maternal age (AMA) and preeclampsia, gestational diabetes, and fetal growth restriction.    She completed a quad scren with low risk results.    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   We discussed the pathophysiology of antibody sensitization as well as the risk of fetal anemia and hydrops. HDFN does not usually occur until a critical titer is reached (1:16 at [facility]).     3/10/25- titer: 1  25- Repeat titer ordered.    Recommendations:  FOB- Presumed positive at least heterozygous (he is father of all their children).  If the FOB is unknown, declines testing, FOB is homozygous for the antigen, or if the patient declines amniocentesis, recommend repeat antibody titer at 4-week intervals, using the same lab as the initial titers. If the patients antibody titer rises to a critical value of 1:16, there are two main options:   Politely declined amniocentesis.  Fetal surveillance by assessment of the MCA peak systolic velocity (PSV) every one to two weeks as determined by MFM.  If the MCA PSV reaches > 1.5 MoM, fetal blood sampling and transfusion may be necessary. This will be coordinated by [facility]     After reaching the critical titer for MCA PSV screening, it is no longer necessary to perform serial antibody titers.  If the fetus is at risk for HDFN (i.e.: undergoing MCA surveillance)  Weekly  testing starting at 32 weeks  Delivery between 37 0/7 - 37 6/7 weeks if no transfusion needed.   Individualization of delivery timing would occur if transfusion is required. Because data are limited regarding timing of delivery, each case will be individualized by the MFM team.  If critical titer is not found, delivery at 39 weeks may be undertaken with no PNT indicated.  Consider maternal blood cross-matching at admission for delivery (if rare antibody and increased risk of hemorrhage) due to potential delay in finding matched units.     Please note that this management scheme assumes that the patients partner is indeed the father of the baby.        - Congenital hypoplasia of nasal  bone  A hypoplastic nasal bone is associated with an increased risk of Down syndrome when isolated. It increases the chance of Down Syndrome 6.6 times. It is estimated that 0.1-1.2% of euploid fetuses have a hypoplastic nasal bone. Hypoplasia of the nasal bone also appears to be more common in fetuses of Afro-Michael women (up to 9%) than those of  women (0.5%). The identification of an absent or hypoplastic nasal bone can be associated with Down syndrome or may be a normal variant. Assuming there is not an underlying chromosomal abnormality, genetic syndrome, or other fetal abnormalities, hypoplastic or absent nasal bone is most likely a normal variant. After a finding of isolated absent nasal bone on ultrasound cfDNA screening vs amniocentesis are recommended. If cfDNA has already been completed and is normal no further testing is recommended.    Two different categories of tests are available in pregnancy to assess for fetal chromosomal abnormalities after a finding of hypoplastic or absent nasal bone, screening tests and diagnostic tests. Screening tests do not provide a definitive answer as to whether a fetus is affected with a chromosome disorder. Rather, screening tests indicate an increased or decreased chance of whether a pregnancy is affected by a condition. cfDNA screening is a recommended option after an isolated absent nasal bone is identified on ultrasound. In all people, small pieces of genetic material (DNA) that are not contained within cells are present in the blood stream. During pregnancy, these small pieces of DNA are a mixture of the mother's DNA and DNA shed from the placenta. This test can indicate if there is an abnormal number of chromosomes 21, 18, 13, X, and Y. The approximate detection rate is 99% for Trisomy 21, 98% for Trisomy 18, 91% for Trisomy 13, and 90% for Monroe Syndrome. The false positive rates are low ranging from 1/1000 to 2/1000.    Screening tests are not  definitive. If the test indicates an increased risk for a chromosome problem, it is recommended to confirm with a diagnostic test such as amniocentesis or CVS. Alternatively, a low risk or negative result on a screening test is reassuring, but it does not eliminate the possibility that the fetus is affected with the condition.    We then reviewed diagnostic testing options. Unlike screening tests, diagnostic tests provide definitive answers regarding the presence of fetal chromosome abnormalities. In addition to assessing for the presence of the common fetal aneuploidies, diagnostic tests can assess for abnormalities in the number and structure of all chromosomes. While both procedures are generally quite safe, there is a risk of miscarriage associated with these procedures. For genetic amniocentesis, the estimated risk of miscarriage attributable to the procedure is 1 in 900.    At the end of our consultation, the patient politely declined further invasive testing given negative quad screen.       F/u in 4 weeks for MFM visit /growth ultrasound

## 2025-04-07 ENCOUNTER — APPOINTMENT (OUTPATIENT)
Dept: LAB | Facility: HOSPITAL | Age: 35
End: 2025-04-07
Attending: NURSE PRACTITIONER
Payer: MEDICAID

## 2025-04-07 ENCOUNTER — PROCEDURE VISIT (OUTPATIENT)
Dept: MATERNAL FETAL MEDICINE | Facility: CLINIC | Age: 35
End: 2025-04-07
Payer: MEDICAID

## 2025-04-07 ENCOUNTER — OFFICE VISIT (OUTPATIENT)
Dept: MATERNAL FETAL MEDICINE | Facility: CLINIC | Age: 35
End: 2025-04-07
Payer: MEDICAID

## 2025-04-07 VITALS
HEIGHT: 66 IN | HEART RATE: 81 BPM | DIASTOLIC BLOOD PRESSURE: 74 MMHG | SYSTOLIC BLOOD PRESSURE: 114 MMHG | BODY MASS INDEX: 30.88 KG/M2 | WEIGHT: 192.13 LBS

## 2025-04-07 DIAGNOSIS — O36.0120 MATERNAL CARE FOR ANTI-D (RH) ANTIBODIES IN SECOND TRIMESTER: Primary | ICD-10-CM

## 2025-04-07 DIAGNOSIS — O09.522 MULTIGRAVIDA OF ADVANCED MATERNAL AGE IN SECOND TRIMESTER: ICD-10-CM

## 2025-04-07 DIAGNOSIS — O10.919 CHRONIC HYPERTENSION AFFECTING PREGNANCY: Primary | ICD-10-CM

## 2025-04-07 DIAGNOSIS — O36.0120 ANTI-D ANTIBODIES PRESENT DURING PREGNANCY IN SECOND TRIMESTER, SINGLE OR UNSPECIFIED FETUS: ICD-10-CM

## 2025-04-07 DIAGNOSIS — O09.899 HISTORY OF MATERNAL SYPHILIS, CURRENTLY PREGNANT: ICD-10-CM

## 2025-04-07 DIAGNOSIS — O10.919 CHRONIC HYPERTENSION AFFECTING PREGNANCY: ICD-10-CM

## 2025-04-07 DIAGNOSIS — Q75.8 CONGENITAL HYPOPLASIA OF NASAL BONE: ICD-10-CM

## 2025-04-07 DIAGNOSIS — O26.872 SHORT CERVICAL LENGTH DURING PREGNANCY IN SECOND TRIMESTER: ICD-10-CM

## 2025-04-07 LAB — ANTIBODY TITER: <1

## 2025-04-07 PROCEDURE — 36415 COLL VENOUS BLD VENIPUNCTURE: CPT

## 2025-04-07 PROCEDURE — 3078F DIAST BP <80 MM HG: CPT | Mod: CPTII,S$GLB,, | Performed by: NURSE PRACTITIONER

## 2025-04-07 PROCEDURE — 99213 OFFICE O/P EST LOW 20 MIN: CPT | Mod: TH,S$GLB,, | Performed by: NURSE PRACTITIONER

## 2025-04-07 PROCEDURE — 86886 COOMBS TEST INDIRECT TITER: CPT | Performed by: NURSE PRACTITIONER

## 2025-04-07 PROCEDURE — 1159F MED LIST DOCD IN RCRD: CPT | Mod: CPTII,S$GLB,, | Performed by: NURSE PRACTITIONER

## 2025-04-07 PROCEDURE — 1160F RVW MEDS BY RX/DR IN RCRD: CPT | Mod: CPTII,S$GLB,, | Performed by: NURSE PRACTITIONER

## 2025-04-07 PROCEDURE — 76816 OB US FOLLOW-UP PER FETUS: CPT | Mod: S$GLB,,, | Performed by: OBSTETRICS & GYNECOLOGY

## 2025-04-07 PROCEDURE — 3074F SYST BP LT 130 MM HG: CPT | Mod: CPTII,S$GLB,, | Performed by: NURSE PRACTITIONER

## 2025-04-07 PROCEDURE — 3008F BODY MASS INDEX DOCD: CPT | Mod: CPTII,S$GLB,, | Performed by: NURSE PRACTITIONER

## 2025-04-08 DIAGNOSIS — O09.899 HISTORY OF MATERNAL SYPHILIS, CURRENTLY PREGNANT: ICD-10-CM

## 2025-04-08 DIAGNOSIS — O10.919 CHRONIC HYPERTENSION AFFECTING PREGNANCY: Primary | ICD-10-CM

## 2025-04-08 DIAGNOSIS — O09.522 MULTIGRAVIDA OF ADVANCED MATERNAL AGE IN SECOND TRIMESTER: ICD-10-CM

## 2025-04-08 DIAGNOSIS — O26.872 SHORT CERVICAL LENGTH DURING PREGNANCY IN SECOND TRIMESTER: ICD-10-CM

## 2025-04-15 ENCOUNTER — OFFICE VISIT (OUTPATIENT)
Dept: FAMILY MEDICINE | Facility: CLINIC | Age: 35
End: 2025-04-15
Payer: MEDICAID

## 2025-04-15 VITALS
HEART RATE: 81 BPM | DIASTOLIC BLOOD PRESSURE: 79 MMHG | WEIGHT: 191.63 LBS | SYSTOLIC BLOOD PRESSURE: 114 MMHG | RESPIRATION RATE: 20 BRPM | HEIGHT: 66 IN | TEMPERATURE: 99 F | BODY MASS INDEX: 30.8 KG/M2 | OXYGEN SATURATION: 100 %

## 2025-04-15 DIAGNOSIS — O09.899 HISTORY OF MATERNAL SYPHILIS, CURRENTLY PREGNANT: ICD-10-CM

## 2025-04-15 DIAGNOSIS — Z3A.24 24 WEEKS GESTATION OF PREGNANCY: Primary | ICD-10-CM

## 2025-04-15 DIAGNOSIS — O10.919 CHRONIC HYPERTENSION AFFECTING PREGNANCY: ICD-10-CM

## 2025-04-15 DIAGNOSIS — O36.0120 ANTI-D ANTIBODIES PRESENT DURING PREGNANCY IN SECOND TRIMESTER, SINGLE OR UNSPECIFIED FETUS: ICD-10-CM

## 2025-04-15 LAB
BILIRUB SERPL-MCNC: NORMAL MG/DL
BLOOD URINE, POC: NORMAL
CLARITY, POC UA: CLEAR
COLOR, POC UA: YELLOW
GLUCOSE UR QL STRIP: NORMAL
KETONES UR QL STRIP: NORMAL
LEUKOCYTE ESTERASE URINE, POC: NORMAL
NITRITE, POC UA: NORMAL
PH, POC UA: 7
PROTEIN, POC: NORMAL
SPECIFIC GRAVITY, POC UA: >1.03
UROBILINOGEN, POC UA: 1

## 2025-04-15 PROCEDURE — 99213 OFFICE O/P EST LOW 20 MIN: CPT | Mod: PBBFAC

## 2025-04-15 RX ORDER — CALC/MAG/B COMPLEX/D3/HERB 61
15 TABLET ORAL DAILY
Qty: 30 CAPSULE | Refills: 11 | Status: SHIPPED | OUTPATIENT
Start: 2025-04-15 | End: 2026-04-15

## 2025-04-15 NOTE — PROGRESS NOTES
[facility] OB OFFICE VISIT NOTE     Primary PCP: Lamar Thorpe MD    Chief Complaint     Savanah Pang is a 34 y.o. female  @ 24w6d w/ KARINA 2025, by Last Menstrual Period consistent with first trimester US, presenting to [facility] for routine OB visit. Followed by MFM, next apt on 2025.     HPI: Doing well today. No complaints. Adherent to Procardia 60mg BID and labetalol 200mg BID. Taking ASA 81mg qd. She is checking BP at home; did not bring logs to visit but states this morning 121/84, BP in office today 114/79. She states that her SBP has stayed around 120's.       Relevant PMH:   cHTN - ASA 81mg qd, Procardia 60mg BID and labetalol 200mg BID.   AMA  Depression/anxiety  H/o syphilis that was treated in . Her partner was also treated. RPR currently non-reactive.   Rh negative status. Anti-D antibody positive. Titer 1. Reports h/o Rhogam in prior pregnancy. No bleeding this pregnancy and has not yet received Rhogam in current pregnancy. Patient declined amniocentesis. MFM recommended repeat antibody titer at 4-week intervals.  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 Plan for f/u scan.  Short cervical length - following MFM for u/s     Obstetrics History     OB History          6    Para   5    Term   5            AB        Living   5         SAB        IAB        Ectopic        Multiple        Live Births   5                  Gynecology History   LMP: Patient's last menstrual period was 10/23/2024 (exact date).  Age at menarche: 12 years  Menstrual hx: regular, 28-30 day cycles, 3-4 pads/day, 4 days per period  History of birth control: OCP and IUD (removed )  History of STDs: S/p treatment of syphilis ()  Abnormal PAPs: (2024): NIL, neg HRHPV  History of prior : None    Medical History    Past Medical History: HTN  Surgical History: None  Family History: Noncontributory   Social History: Denies tobacco, alcohol and illicit drug  "use  Medications: below    Review of Systems   Review of Systems   Constitutional:  Negative for chills and fever.   Respiratory:  Negative for shortness of breath.    Cardiovascular:  Negative for chest pain.   Gastrointestinal:  Negative for abdominal pain, nausea and vomiting.   Genitourinary:  Negative for difficulty urinating and dysuria.     Antepartum specific   - Fetal movements: yes  - Vaginal bleeding: no  - Vaginal discharge: no  - Loss of fluid: no  - Contractions: no  - Headaches: no  - Vision changes: no  - Edema: yes, edema ankles bilaterally. Unchanged from prior.    Vital Signs     Vitals:    04/15/25 1343   BP: 114/79   BP Location: Right arm   Patient Position: Sitting   Pulse: 81   Resp: 20   Temp: 99.3 °F (37.4 °C)   TempSrc: Oral   SpO2: 100%   Weight: 86.9 kg (191 lb 9.6 oz)   Height: 5' 6" (1.676 m)       Physical Exam   General: in no acute distress  RESP: clear to auscultation bilaterally, non labored  CV: regular rate and rhythm, no murmurs, trace edema bilateral ankles  ABD: non-tender, BS+  FHTs: 140's bpm via Doppler  Fundal height: 23 cm    Current Medications:      Current Outpatient Medications   Medication Instructions    aspirin (ECOTRIN) 81 mg, Oral, Daily    labetaloL (NORMODYNE) 200 mg, Oral, 2 times daily    lansoprazole (PREVACID) 15 mg, Oral, Daily    NIFEdipine (PROCARDIA-XL) 60 mg, Oral, 2 times daily    PNV,calcium 72-iron-folic acid (PRENATAL VITAMIN PLUS LOW IRON) 27 mg iron- 1 mg Tab 1 each, Oral, Daily       Labs   Urine dipstick:   Lab Results   Component Value Date    COLORU Yellow 04/15/2025    SPECGRAV >1.030 04/15/2025    PHUR 7.0 04/15/2025    WBCUR neg 04/15/2025    NITRITE neg 04/15/2025    PROTEINPOC trace 04/15/2025    GLUCOSEUR neg 04/15/2025    KETONESU neg 04/15/2025    UROBILINOGEN 1.0 04/15/2025    BILIRUBINPOC neg 04/15/2025    RBCUR neg 04/15/2025       Initial OB Labs: (2/13/2025)  - Blood Type and Rh: O neg  - Antibody Screen: Pos, Anti-D  - CBC H/H: " 12.7/36.7  - BUN/Cr: 5.8/0.66  - HIV: NR  - Syphilis: Ab - Reactive, RPR - NR  - GC: ND  - CT: ND  - HBsAg: NR  - HCVAb: NR  - Rubella: Immune  - Varicella: Immune  - UA & Culture: Mixed Skin/Urogenital Alexandra Isolated, no further workup   - Sickle Cell Screen: Neg  - Urine protein/Cr: <6.8/65.3  - PAP: NIL HRHPV negative  - Influenza vaccine date: Informed refusal  - BTL desired: No  - Aspirin: Prescribed Indications: chronic htn, Age >/= 35 years     15-20 Weeks Lab: (2025)  - Quad Screen: normal risk    28 Week Labs: (Date)  - 1H GTT:   - Rhogam (N/A if not applicable):   - Date of Tdap:   - CBC H/H:   - RPR:   -  Consent Form Signed (N/A if not applicable):   - BTL consent:   - [facility] for pediatric care:     37 Week Labs: (Date)  - CBC H/H:   - RPR:   - GBS Culture:   - HIV:   - Cervical GC:   - Cervical Exam:     38 Weeks:  -Cervical Exam:     39 Weeks:  -Cervical Exam:  -NST Exam:    FM Continuity Patient: no  Previous : no    Scheduled delivery: induction or  (can NOT be scheduled any earlier than 39w0d):    Imaging    Initial US: 25  Single intrauterine pregnancy with a fetal heart rate of 162 beats per minute. Estimated age by ultrasound 16 weeks and 5 days +/-1 week 1 day. Estimated date of delivery by ultrasound 2025    Anatomy Scan: 3/11/25  A detailed fetal anatomic ultrasound examination was performed for the following high risk indication: AMA, absent nasal bone.   Absent nasal bone is suspected on US today and no other abnormalities are seen. Fetal imaging is incomplete today.   A follow-up study will be scheduled to complete the fetal anatomic survey.   Fetal size today is consistent with established gestational age.   Transvaginal cervical length measures 2.7cm without funneling.   Placental location is anterior without evidence of previa.   MCA Dopplers are normal.     Assessment     1. 24 weeks gestation of pregnancy    2. - Chronic hypertension affecting  pregnancy    3. - History of maternal syphilis, currently pregnant    4. 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 Plan to repeat Anti-D titers every 4 weeks (next visit) and repeat RPR in third trimester.  - Rhogam next visit   - Mother plans to bottle feed  - Postpartum contraception discussion: desires IUD  - Keep apts with MFM   - Blood pressures at goal with current medication regimen. Continue Procardia 60 mg BID and labetalol 200 mg BID. Instructed to keep BP log and upload to portal. Call with any persistently elevated BP or present to OB ED for any severe range BP  - ED precautions discussed: vaginal bleeding or leaking fluid, belly cramping or pain, SOB/chest pain, RUQ pain, swelling of the face/lower extremities, vision changes. If don't feel the baby move in over an hour. Severe headaches that are not resolved with medication.     Return to clinic in 3 weeks for follow up.    Bharathi Feliciano MD  [facility], [facility]

## 2025-05-03 NOTE — ASSESSMENT & PLAN NOTE
Previously counseled the patient on maternal/fetal risks associated with CHTN during pregnancy. Risks include but not limited to fetal growth restriction, miscarriage, abruption, maternal end organ disease (renal failure, MI, and stroke),  delivery, development of superimposed preeclampsia, and eclampsia.     3/10/25- Currently taking Nifedipine 30mg XL BID and Labetalol 200mg BID  3/19/25- /76. Doing well without complaints.   25-BP has been well controlled since adjusting Nifedipine on 3/10. BP today 111/73  25- Doing very well. Asymptomatic. /73    Recommendations (Please refer to [facility] guidelines):  -Continue aspirin 81 mg daily for preeclampsia risk reduction  -Continue current medications: Nifedipine to 60mg XL BID and Labetalol 200mg BID  -Baseline evaluation with primary OB:   24-hour urine protein or baseline P/C ratio, CMP, and CBC (completed)  Maternal EKG  Maternal ophthalmic evaluation  Maternal echocardiogram if HTN has been long-standing or EKG is abnormal  -Serial fetal growth ultrasounds every 4-6 weeks, beginning at 26-28 weeks.   -Continued close observation of patient's blood pressures. Avoid hypotension as this has been associated with uteroplacental insufficiency.  -In conjunction with the CHAP study recommendation for BP control: If BP is persistently >=140/90 antihypertensive medication is recommended with goal BP of 120-140/80-90.  -Weekly antepartum testing at 32 weeks (NST+AFV); twice weekly testing if control is poor, multiple comorbidities are present, or requires several medications for control   -Delivery timing:  No medications, no comorbid conditions: 39 0/7 - 39 6/7 weeks gestation  No medications, comorbid conditions: 38 0/7 - 38 6/7 weeks gestation  Controlled on single agent, no comorbid conditions: 38 0/7 - 38 6/7 weeks gestation  Controlled on single agent, comorbid conditions: 37 0/7 - 38 6/7 weeks gestation  Uncontrolled or requiring >= 2  medications: 36 0/7 - 37 6/7 weeks gestation    Comorbid conditions include BMI >= 40, diabetes, and complex medical condition associated with placental dysfunction (ie lupus or other vascular disease)  Delivery may be recommended earlier pending results of fetal growth ultrasounds, AFV assessment, or antepartum testing results.

## 2025-05-03 NOTE — ASSESSMENT & PLAN NOTE
There are > 50 different red cell antigens that have been associated with hemolytic disease of the fetus and  (HDFN), but the antibodies frequently associated with severe HDFN include those against RhD, Rhc, and [place] (K1). 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   We discussed the pathophysiology of antibody sensitization as well as the risk of fetal anemia and hydrops. HDFN does not usually occur until a critical titer is reached (1:16 at [facility]).     3/10/25- titer: 1  25- titer <1.  5 - due for repeat - ordered    Recommendations:  FOB- Presumed positive at least heterozygous (he is father of all their children).  If the FOB is unknown, declines testing, FOB is homozygous for the antigen, or if the patient declines amniocentesis, recommend repeat antibody titer at 4-week intervals, using the same lab as the initial titers. If the patients antibody titer rises to a critical value of 1:16, there are two main options:   Politely declined amniocentesis.  Fetal surveillance by assessment of the MCA peak systolic velocity (PSV) every one to two weeks as determined by MFM.  If the MCA PSV reaches > 1.5 MoM, fetal blood sampling and transfusion may be necessary. This will be coordinated by MFM     After reaching the critical titer for MCA PSV screening, it is no longer necessary to perform serial antibody titers.  If the fetus is at risk for HDFN (i.e.: undergoing MCA surveillance)  Weekly  testing starting at 32 weeks  Delivery between 37 0/7 - 37 6/7 weeks if no transfusion needed.   Individualization of delivery timing would occur if transfusion is required. Because data are limited regarding timing of delivery, each case will be individualized by the MFM team.  If critical titer is not found, delivery at 39 weeks may be undertaken with no PNT indicated.  Consider maternal blood cross-matching at admission for delivery (if rare antibody and increased risk of hemorrhage) due to potential  delay in finding matched units.     Please note that this management scheme assumes that the patients partner is indeed the father of the baby.

## 2025-05-03 NOTE — PROGRESS NOTES
"Maternal Fetal Medicine follow up consult    SUBJECTIVE:     Savanah Pang is a 35 y.o.  female with IUP at 27w5d who is seen in follow up consultation by MFM.  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  She's been doing very well. Feeling great. Bps well controlled.   Having some heartburn. Meds Rx'ed by primary OB and helping.   Denies LOF, VB, contractions. Positive fetal movement.    Previous notes reviewed.   No changes to medical, surgical, family, social or obstetric history.  Interval history since last MFM visit: see above  Medications reviewed.    Care team members:  [facility] - Primary OB     OBJECTIVE:   /73 (BP Location: Right arm, Patient Position: Sitting)   Pulse 81   Ht 5' 6" (1.676 m)   Wt 88 kg (194 lb 0.1 oz)   LMP 10/23/2024 (Exact Date)   BMI 31.31 kg/m²     Ultrasound performed. See viewpoint for full ultrasound report.    ASSESSMENT/PLAN:     35 y.o.  female with IUP at 27w5d    - Chronic hypertension affecting pregnancy  Previously counseled the patient on maternal/fetal risks associated with CHTN during pregnancy. Risks include but not limited to fetal growth restriction, miscarriage, abruption, maternal end organ disease (renal failure, MI, and stroke),  delivery, development of superimposed preeclampsia, and eclampsia.     3/10/25- Currently taking Nifedipine 30mg XL BID and Labetalol 200mg BID  3/19/25- /76. Doing well without complaints.   25-BP has been well controlled since adjusting Nifedipine on 3/10. BP today 111/73  25- Doing very well. Asymptomatic. BP " 116/73    Recommendations (Please refer to [facility] guidelines):  -Continue aspirin 81 mg daily for preeclampsia risk reduction  -Continue current medications: Nifedipine to 60mg XL BID and Labetalol 200mg BID  -Baseline evaluation with primary OB:   24-hour urine protein or baseline P/C ratio, CMP, and CBC (completed)  Maternal EKG  Maternal ophthalmic evaluation  Maternal echocardiogram if HTN has been long-standing or EKG is abnormal  -Serial fetal growth ultrasounds every 4-6 weeks, beginning at 26-28 weeks.   -Continued close observation of patient's blood pressures. Avoid hypotension as this has been associated with uteroplacental insufficiency.  -In conjunction with the CHAP study recommendation for BP control: If BP is persistently >=140/90 antihypertensive medication is recommended with goal BP of 120-140/80-90.  -Weekly antepartum testing at 32 weeks (NST+AFV); twice weekly testing if control is poor, multiple comorbidities are present, or requires several medications for control   -Delivery timing:  No medications, no comorbid conditions: 39 0/7 - 39 6/7 weeks gestation  No medications, comorbid conditions: 38 0/7 - 38 6/7 weeks gestation  Controlled on single agent, no comorbid conditions: 38 0/7 - 38 6/7 weeks gestation  Controlled on single agent, comorbid conditions: 37 0/7 - 38 6/7 weeks gestation  Uncontrolled or requiring >= 2 medications: 36 0/7 - 37 6/7 weeks gestation    Comorbid conditions include BMI >= 40, diabetes, and complex medical condition associated with placental dysfunction (ie lupus or other vascular disease)  Delivery may be recommended earlier pending results of fetal growth ultrasounds, AFV assessment, or antepartum testing results.      - History of maternal syphilis, currently pregnant  Syphilis is a sexually and congenitally transmitted disease caused by the spirochete Treponema pallidum. Early infections can be found due to symptoms (such as chancre, skin rash,  lymphadenopathy, etc.) or due to recent seroconversion. Latent syphilis acquired within the preceding year is referred to as early latent syphilis; all other cases of latent syphilis are late latent syphilis or syphilis of unknown duration. T. pallidum can infect the central nervous system and result in neurosyphilis, which can occur at any stage of syphilis. Early neurologic clinical manifestations (i.e., cranial nerve dysfunction, meningitis, stroke, acute altered mental status, and auditory or ophthalmic abnormalities) are usually present within the first few months or years of infection. Late neurologic manifestations (i.e., tabes dorsalis and general paresis) occur 10-30 years after infection.     Received full treatment in 2017 (partner also). Repeat RPR in first trimester: nonreactive.     Recommendations  - recheck RPR in third trimester  - already treated with PCN   - serial growth u/s    - Short cervical length during pregnancy in second trimester  3/10/25- On today's ultrasound, a short cervix is identified. See previous documentation for specific counseling. CL 27mm. No funneling.  3/19/25- CL >30mm  4/2/25- CL 29mm  4/7/25- CL stable. No further assessments recommended.    Recommendations:  Given current gestational age of 23w, no further cervical length screenings recommended.  Avoid moderate to high impact exercise and heavy lifting  Strict PTL precautions reviewed with patient        - Multigravida of advanced maternal age in second trimester  Previously counseled the patient on the relationship between maternal age and fetal aneuploidy.  Additionally, we discussed the association between advanced maternal age (AMA) and preeclampsia, gestational diabetes, and fetal growth restriction.    She completed a quad scren with low risk results.    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   We discussed the pathophysiology of antibody sensitization as well as the risk of fetal anemia and hydrops. HDFN does not usually occur until a critical titer is reached (1:16 at [facility]).     3/10/25- titer: 1  25- titer <1.  5/7 - due for repeat - ordered    Recommendations:  FOB- Presumed positive at least heterozygous (he is father of all their children).  If the FOB is unknown, declines testing, FOB is homozygous for the antigen, or if the patient declines amniocentesis, recommend repeat antibody titer at 4-week intervals, using the same lab as the initial titers. If the patients antibody titer rises to a critical value of 1:16, there are two main options:   Politely declined amniocentesis.  Fetal surveillance by assessment of the MCA peak systolic velocity (PSV) every one to two weeks as determined by MFM.  If the MCA PSV reaches > 1.5 MoM, fetal blood sampling and transfusion may be necessary. This will be coordinated by MFM     After reaching the critical titer for MCA PSV screening, it is no longer necessary to perform serial antibody titers.  If the fetus is at risk for HDFN (i.e.: undergoing MCA surveillance)  Weekly  testing starting at 32 weeks  Delivery between 37 0/7 - 37 6/7 weeks if no transfusion needed.   Individualization of delivery timing would occur if transfusion is required. Because data are limited regarding timing of delivery, each case will be individualized by the MFM team.  If critical titer is not found, delivery at 39 weeks may be undertaken with no PNT indicated.  Consider maternal blood cross-matching at admission for delivery (if rare antibody and increased risk of hemorrhage) due to potential delay in finding matched units.      Please note that this management scheme assumes that the patients partner is indeed the father of the baby.        - Congenital hypoplasia of nasal bone  A hypoplastic nasal bone is associated with an increased risk of Down syndrome when isolated. It increases the chance of Down Syndrome 6.6 times. It is estimated that 0.1-1.2% of euploid fetuses have a hypoplastic nasal bone. Hypoplasia of the nasal bone also appears to be more common in fetuses of Afro-Michael women (up to 9%) than those of  women (0.5%). The identification of an absent or hypoplastic nasal bone can be associated with Down syndrome or may be a normal variant. Assuming there is not an underlying chromosomal abnormality, genetic syndrome, or other fetal abnormalities, hypoplastic or absent nasal bone is most likely a normal variant. After a finding of isolated absent nasal bone on ultrasound cfDNA screening vs amniocentesis are recommended. If cfDNA has already been completed and is normal no further testing is recommended.    Two different categories of tests are available in pregnancy to assess for fetal chromosomal abnormalities after a finding of hypoplastic or absent nasal bone, screening tests and diagnostic tests. Screening tests do not provide a definitive answer as to whether a fetus is affected with a chromosome disorder. Rather, screening tests indicate an increased or decreased chance of whether a pregnancy is affected by a condition. cfDNA screening is a recommended option after an isolated absent nasal bone is identified on ultrasound. In all people, small pieces of genetic material (DNA) that are not contained within cells are present in the blood stream. During pregnancy, these small pieces of DNA are a mixture of the mother's DNA and DNA shed from the placenta. This test can indicate if there is an abnormal number of chromosomes 21, 18, 13, X, and Y. The approximate detection rate is 99% for Trisomy 21, 98% for  Trisomy 18, 91% for Trisomy 13, and 90% for Monroe Syndrome. The false positive rates are low ranging from 1/1000 to 2/1000.    Screening tests are not definitive. If the test indicates an increased risk for a chromosome problem, it is recommended to confirm with a diagnostic test such as amniocentesis or CVS. Alternatively, a low risk or negative result on a screening test is reassuring, but it does not eliminate the possibility that the fetus is affected with the condition.    We then reviewed diagnostic testing options. Unlike screening tests, diagnostic tests provide definitive answers regarding the presence of fetal chromosome abnormalities. In addition to assessing for the presence of the common fetal aneuploidies, diagnostic tests can assess for abnormalities in the number and structure of all chromosomes. While both procedures are generally quite safe, there is a risk of miscarriage associated with these procedures. For genetic amniocentesis, the estimated risk of miscarriage attributable to the procedure is 1 in 900.    At the end of our consultation, the patient politely declined further invasive testing given negative quad screen.       F/u in 4 weeks for MFM visit /growth ultrasound

## 2025-05-05 ENCOUNTER — PROCEDURE VISIT (OUTPATIENT)
Dept: MATERNAL FETAL MEDICINE | Facility: CLINIC | Age: 35
End: 2025-05-05
Payer: MEDICAID

## 2025-05-05 ENCOUNTER — OFFICE VISIT (OUTPATIENT)
Dept: MATERNAL FETAL MEDICINE | Facility: CLINIC | Age: 35
End: 2025-05-05
Payer: MEDICAID

## 2025-05-05 VITALS
BODY MASS INDEX: 31.18 KG/M2 | SYSTOLIC BLOOD PRESSURE: 116 MMHG | WEIGHT: 194 LBS | HEIGHT: 66 IN | HEART RATE: 81 BPM | DIASTOLIC BLOOD PRESSURE: 73 MMHG

## 2025-05-05 DIAGNOSIS — O09.522 MULTIGRAVIDA OF ADVANCED MATERNAL AGE IN SECOND TRIMESTER: ICD-10-CM

## 2025-05-05 DIAGNOSIS — O09.899 HISTORY OF MATERNAL SYPHILIS, CURRENTLY PREGNANT: ICD-10-CM

## 2025-05-05 DIAGNOSIS — O26.872 SHORT CERVICAL LENGTH DURING PREGNANCY IN SECOND TRIMESTER: ICD-10-CM

## 2025-05-05 DIAGNOSIS — O10.919 CHRONIC HYPERTENSION AFFECTING PREGNANCY: Primary | ICD-10-CM

## 2025-05-05 DIAGNOSIS — Q75.8 CONGENITAL HYPOPLASIA OF NASAL BONE: ICD-10-CM

## 2025-05-05 DIAGNOSIS — O36.0120 ANTI-D ANTIBODIES PRESENT DURING PREGNANCY IN SECOND TRIMESTER, SINGLE OR UNSPECIFIED FETUS: ICD-10-CM

## 2025-05-05 DIAGNOSIS — O10.919 CHRONIC HYPERTENSION AFFECTING PREGNANCY: ICD-10-CM

## 2025-05-05 PROCEDURE — 99214 OFFICE O/P EST MOD 30 MIN: CPT | Mod: TH,S$GLB,, | Performed by: NURSE PRACTITIONER

## 2025-05-05 PROCEDURE — 76816 OB US FOLLOW-UP PER FETUS: CPT | Mod: S$GLB,,, | Performed by: OBSTETRICS & GYNECOLOGY

## 2025-05-05 PROCEDURE — 1159F MED LIST DOCD IN RCRD: CPT | Mod: CPTII,S$GLB,, | Performed by: NURSE PRACTITIONER

## 2025-05-05 PROCEDURE — 3074F SYST BP LT 130 MM HG: CPT | Mod: CPTII,S$GLB,, | Performed by: NURSE PRACTITIONER

## 2025-05-05 PROCEDURE — 3008F BODY MASS INDEX DOCD: CPT | Mod: CPTII,S$GLB,, | Performed by: NURSE PRACTITIONER

## 2025-05-05 PROCEDURE — 3078F DIAST BP <80 MM HG: CPT | Mod: CPTII,S$GLB,, | Performed by: NURSE PRACTITIONER

## 2025-05-05 PROCEDURE — 1160F RVW MEDS BY RX/DR IN RCRD: CPT | Mod: CPTII,S$GLB,, | Performed by: NURSE PRACTITIONER

## 2025-05-06 RX ORDER — CALC/MAG/B COMPLEX/D3/HERB 61
15 TABLET ORAL DAILY
Qty: 30 CAPSULE | Refills: 11 | Status: SHIPPED | OUTPATIENT
Start: 2025-05-06 | End: 2026-05-06

## 2025-05-07 DIAGNOSIS — O09.899 HISTORY OF MATERNAL SYPHILIS, CURRENTLY PREGNANT: ICD-10-CM

## 2025-05-07 DIAGNOSIS — O10.919 CHRONIC HYPERTENSION AFFECTING PREGNANCY: Primary | ICD-10-CM

## 2025-05-08 ENCOUNTER — OFFICE VISIT (OUTPATIENT)
Dept: FAMILY MEDICINE | Facility: CLINIC | Age: 35
End: 2025-05-08
Payer: MEDICAID

## 2025-05-08 ENCOUNTER — LAB VISIT (OUTPATIENT)
Dept: LAB | Facility: HOSPITAL | Age: 35
End: 2025-05-08
Payer: MEDICAID

## 2025-05-08 VITALS
BODY MASS INDEX: 31.47 KG/M2 | WEIGHT: 195.81 LBS | DIASTOLIC BLOOD PRESSURE: 69 MMHG | OXYGEN SATURATION: 97 % | RESPIRATION RATE: 20 BRPM | HEIGHT: 66 IN | HEART RATE: 80 BPM | SYSTOLIC BLOOD PRESSURE: 104 MMHG | TEMPERATURE: 98 F

## 2025-05-08 DIAGNOSIS — O10.919 CHRONIC HYPERTENSION AFFECTING PREGNANCY: ICD-10-CM

## 2025-05-08 DIAGNOSIS — Z67.91 RH NEGATIVE STATE IN ANTEPARTUM PERIOD: ICD-10-CM

## 2025-05-08 DIAGNOSIS — Z3A.28 28 WEEKS GESTATION OF PREGNANCY: ICD-10-CM

## 2025-05-08 DIAGNOSIS — Z3A.28 28 WEEKS GESTATION OF PREGNANCY: Primary | ICD-10-CM

## 2025-05-08 DIAGNOSIS — O36.0130 ANTI-D ANTIBODIES PRESENT DURING PREGNANCY IN THIRD TRIMESTER, SINGLE OR UNSPECIFIED FETUS: ICD-10-CM

## 2025-05-08 DIAGNOSIS — O26.873 SHORT CERVICAL LENGTH DURING PREGNANCY IN THIRD TRIMESTER: ICD-10-CM

## 2025-05-08 DIAGNOSIS — O09.899 HISTORY OF MATERNAL SYPHILIS, CURRENTLY PREGNANT: ICD-10-CM

## 2025-05-08 DIAGNOSIS — O26.899 RH NEGATIVE STATE IN ANTEPARTUM PERIOD: ICD-10-CM

## 2025-05-08 LAB
BASOPHILS # BLD AUTO: 0.01 X10(3)/MCL
BASOPHILS NFR BLD AUTO: 0.1 %
BILIRUB SERPL-MCNC: NORMAL MG/DL
BLOOD URINE, POC: NORMAL
CLARITY, POC UA: NORMAL
COLOR, POC UA: NORMAL
EOSINOPHIL # BLD AUTO: 0.04 X10(3)/MCL (ref 0–0.9)
EOSINOPHIL NFR BLD AUTO: 0.5 %
ERYTHROCYTE [DISTWIDTH] IN BLOOD BY AUTOMATED COUNT: 12 % (ref 11.5–17)
GLUCOSE P FAST SERPL-MCNC: 99 MG/DL (ref 70–100)
GLUCOSE UR QL STRIP: NORMAL
HCT VFR BLD AUTO: 33.3 % (ref 37–47)
HGB BLD-MCNC: 11.2 G/DL (ref 12–16)
IMM GRANULOCYTES # BLD AUTO: 0.02 X10(3)/MCL (ref 0–0.04)
IMM GRANULOCYTES NFR BLD AUTO: 0.2 %
KETONES UR QL STRIP: NORMAL
LEUKOCYTE ESTERASE URINE, POC: NORMAL
LYMPHOCYTES # BLD AUTO: 1.79 X10(3)/MCL (ref 0.6–4.6)
LYMPHOCYTES NFR BLD AUTO: 20.5 %
MCH RBC QN AUTO: 30 PG (ref 27–31)
MCHC RBC AUTO-ENTMCNC: 33.6 G/DL (ref 33–36)
MCV RBC AUTO: 89.3 FL (ref 80–94)
MONOCYTES # BLD AUTO: 0.47 X10(3)/MCL (ref 0.1–1.3)
MONOCYTES NFR BLD AUTO: 5.4 %
NEUTROPHILS # BLD AUTO: 6.4 X10(3)/MCL (ref 2.1–9.2)
NEUTROPHILS NFR BLD AUTO: 73.3 %
NITRITE, POC UA: NORMAL
NRBC BLD AUTO-RTO: 0 %
PH, POC UA: 6
PLATELET # BLD AUTO: 145 X10(3)/MCL (ref 130–400)
PMV BLD AUTO: 11.2 FL (ref 7.4–10.4)
PROTEIN, POC: NORMAL
RBC # BLD AUTO: 3.73 X10(6)/MCL (ref 4.2–5.4)
SPECIFIC GRAVITY, POC UA: 1.03
T PALLIDUM AB SER QL: REACTIVE
UROBILINOGEN, POC UA: 0.2
WBC # BLD AUTO: 8.73 X10(3)/MCL (ref 4.5–11.5)

## 2025-05-08 PROCEDURE — 36415 COLL VENOUS BLD VENIPUNCTURE: CPT

## 2025-05-08 PROCEDURE — 86780 TREPONEMA PALLIDUM: CPT

## 2025-05-08 PROCEDURE — 86592 SYPHILIS TEST NON-TREP QUAL: CPT

## 2025-05-08 PROCEDURE — 85025 COMPLETE CBC W/AUTO DIFF WBC: CPT

## 2025-05-08 PROCEDURE — 82947 ASSAY GLUCOSE BLOOD QUANT: CPT

## 2025-05-08 PROCEDURE — 99213 OFFICE O/P EST LOW 20 MIN: CPT | Mod: PBBFAC

## 2025-05-08 PROCEDURE — 86780 TREPONEMA PALLIDUM: CPT | Mod: 59

## 2025-05-08 NOTE — PROGRESS NOTES
[facility] OB OFFICE VISIT NOTE     Primary PCP: Lamar Thorpe MD    Chief Complaint     Savanah Pang is a 35 y.o. female  @ 28w1d  w/ KARINA 2025, by Last Menstrual Period consistent with first trimester US, presenting to [facility] for routine OB visit. Followed by MFM, next apt on 2025.     HPI: Doing well today. No complaints. Adherent to Procardia 60mg BID and labetalol 200mg BID. Taking ASA 81mg qd. She is checking BP at home; did not bring logs to visit but states this morning this week has ranged 110s/70s, denies headache, vision changes, swelling, chest pain.       Relevant PMH:   cHTN - ASA 81mg qd, Procardia 60mg BID and labetalol 200mg BID.   AMA  Depression/anxiety  H/o syphilis that was treated in . Her partner was also treated. RPR currently non-reactive.   Rh negative status. Anti-D antibody positive. Titer 1. Reports h/o Rhogam in prior pregnancy. No bleeding this pregnancy and has not yet received Rhogam in current pregnancy. Patient declined amniocentesis.    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 Plan for f/u scan.  Short cervical length - following MFM for u/s     MFM recommendations  MFM to repeat antibody titer at 4-week intervals.  Delivery at 36 0/7 - 37 6/7 weeks gestation     Obstetrics History     OB History          6    Para   5    Term   5            AB        Living   5         SAB        IAB        Ectopic        Multiple        Live Births   5                  Gynecology History   LMP: Patient's last menstrual period was 10/23/2024 (exact date).  Age at menarche: 12 years  Menstrual hx: regular, 28-30 day cycles, 3-4 pads/day, 4 days per period  History of birth control: OCP and IUD (removed )  History of STDs: S/p treatment of syphilis (2017)  Abnormal PAPs: (2024): NIL, neg HRHPV  History of prior : None    Medical History    Past Medical History: HTN  Surgical History: None  Family History: Noncontributory   Social  "History: Denies tobacco, alcohol and illicit drug use  Medications: below    Review of Systems   Review of Systems   Constitutional:  Negative for chills and fever.   Respiratory:  Negative for shortness of breath.    Cardiovascular:  Negative for chest pain.   Gastrointestinal:  Negative for abdominal pain, nausea and vomiting.   Genitourinary:  Negative for difficulty urinating and dysuria.     Antepartum specific   - Fetal movements: yes  - Vaginal bleeding: no  - Vaginal discharge: no  - Loss of fluid: no  - Contractions: no  - Headaches: no  - Vision changes: no  - Edema: yes, edema ankles bilaterally. Unchanged from prior.    Vital Signs     Vitals:    05/08/25 1316   BP: 104/69   BP Location: Right arm   Patient Position: Sitting   Pulse: 80   Resp: 20   Temp: 97.8 °F (36.6 °C)   TempSrc: Oral   SpO2: 97%   Weight: 88.8 kg (195 lb 12.8 oz)   Height: 5' 6" (1.676 m)         Physical Exam   General: in no acute distress  RESP: clear to auscultation bilaterally, non labored  CV: regular rate and rhythm, no murmurs, trace edema bilateral ankles  ABD: non-tender, BS+  FHTs: 140's bpm via Doppler  Fundal height: 29 cm    Current Medications:      Current Outpatient Medications   Medication Instructions    aspirin (ECOTRIN) 81 mg, Oral, Daily    labetaloL (NORMODYNE) 200 mg, Oral, 2 times daily    lansoprazole (PREVACID) 15 mg, Oral, Daily    NIFEdipine (PROCARDIA-XL) 60 mg, Oral, 2 times daily    PNV,calcium 72-iron-folic acid (PRENATAL VITAMIN PLUS LOW IRON) 27 mg iron- 1 mg Tab 1 each, Oral, Daily       Labs   Urine dipstick:   Lab Results   Component Value Date    COLORU Dark Yellow 05/08/2025    SPECGRAV 1.030 05/08/2025    PHUR 6.0 05/08/2025    WBCUR trace 05/08/2025    NITRITE neg 05/08/2025    PROTEINPOC 30mg/dl 05/08/2025    GLUCOSEUR neg 05/08/2025    KETONESU trace 05/08/2025    UROBILINOGEN 0.2 05/08/2025    BILIRUBINPOC neg 05/08/2025    RBCUR neg 05/08/2025       Initial OB Labs: (2/13/2025)  - Blood " Type and Rh: O neg  - Antibody Screen: Pos, Anti-D  - CBC H/H: 12.7/36.7  - BUN/Cr: 5.8/0.66  - HIV: NR  - Syphilis: Ab - Reactive, RPR - NR  - GC: ND  - CT: ND  - HBsAg: NR  - HCVAb: NR  - Rubella: Immune  - Varicella: Immune  - UA & Culture: Mixed Skin/Urogenital Alexandra Isolated, no further workup   - Sickle Cell Screen: Neg  - Urine protein/Cr: <6.8/65.3  - PAP: NIL HRHPV negative  - Influenza vaccine date: Informed refusal  - BTL desired: No  - Aspirin: Prescribed Indications: chronic htn, Age >/= 35 years     15-20 Weeks Lab: (2025)  - Quad Screen: normal risk    28 Week Labs: (Date 2025)  - 1H GTT:   - Rhogam (N/A if not applicable):   - Date of Tdap: given 2025  - CBC H/H:   - RPR:   - [facility] for pediatric care: other children follow elsewhere    37 Week Labs: (Date)  - CBC H/H:   - RPR:   - GBS Culture:   - HIV:   - Cervical GC:   - Cervical Exam:     38 Weeks:  -Cervical Exam:     39 Weeks:  -Cervical Exam:  -NST Exam:    FM Continuity Patient: no  Previous : no    Scheduled delivery: induction or  (can NOT be scheduled any earlier than 39w0d):    Imaging    Initial US: 25  Single intrauterine pregnancy with a fetal heart rate of 162 beats per minute. Estimated age by ultrasound 16 weeks and 5 days +/-1 week 1 day. Estimated date of delivery by ultrasound 2025    Anatomy Scan: 3/11/25  A detailed fetal anatomic ultrasound examination was performed for the following high risk indication: AMA, absent nasal bone.   Absent nasal bone is suspected on US today and no other abnormalities are seen. Fetal imaging is incomplete today.   A follow-up study will be scheduled to complete the fetal anatomic survey.   Fetal size today is consistent with established gestational age.   Transvaginal cervical length measures 2.7cm without funneling.   Placental location is anterior without evidence of previa.   MCA Dopplers are normal.     2025  1. Fetal anatomy was again surveyed  today. While no fetal structural anomalies are identified on sufficiently visualized structures, septum and spine views remain   suboptimal. No findings raise concern for underlying anomaly that would modify mode or location of delivery.  US is incompletely sensitive to detect all congenital   anomalies.     2025  Fetal size is AGA with the EFW at the 36% and the AC at the 12%. The EFW is 1042 g.   A limited repeat fetal anatomic survey shows no abnormalities of the structures that were adequately imaged.   AFV is normal.     Assessment     1. 28 weeks gestation of pregnancy    2. - Chronic hypertension affecting pregnancy    3. 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 - History of maternal syphilis, currently pregnant    5. - Short cervical length during pregnancy in second trimester          Plan     - OB Protocol discussed  - PNVs  - Continue ASA 81 mg daily  - Urine dip reviewed as above  - Routine labs: as above, reviewed. Plan to repeat Anti-D titers every 4 weeks (next visit) and repeat RPR in third trimester.  - Rhogam on  nurse/lab visit  - Mother plans to bottle feed  - Postpartum contraception discussion: desires IUD  - Keep apts with MFM   - Blood pressures at goal with current medication regimen. Continue Procardia 60 mg BID and labetalol 200 mg BID. Instructed to keep BP log and bring to visits Call with any persistently elevated BP or present to OB ED for any severe range BP  - ED precautions discussed: vaginal bleeding or leaking fluid, belly cramping or pain, SOB/chest pain, RUQ pain, swelling of the face/lower extremities, vision changes. If don't feel the baby move in over an hour. Severe headaches that are not resolved with medication.     Return to clinic in 2 weeks    Levy Colón DO  [facility] Family Medicine Resident, [facility]

## 2025-05-09 ENCOUNTER — PATIENT MESSAGE (OUTPATIENT)
Dept: OBGYN | Facility: CLINIC | Age: 35
End: 2025-05-09
Payer: MEDICAID

## 2025-05-09 LAB — RPR SER QL: NORMAL

## 2025-05-12 ENCOUNTER — CLINICAL SUPPORT (OUTPATIENT)
Dept: FAMILY MEDICINE | Facility: CLINIC | Age: 35
End: 2025-05-12
Payer: MEDICAID

## 2025-05-12 DIAGNOSIS — Z67.91 RH NEGATIVE STATE IN ANTEPARTUM PERIOD: ICD-10-CM

## 2025-05-12 DIAGNOSIS — O26.899 RH NEGATIVE STATE IN ANTEPARTUM PERIOD: ICD-10-CM

## 2025-05-12 LAB — T PALLIDUM AB SER QL AGGL: POSITIVE

## 2025-05-12 PROCEDURE — 86850 RBC ANTIBODY SCREEN: CPT

## 2025-05-12 PROCEDURE — 86886 COOMBS TEST INDIRECT TITER: CPT | Mod: 91

## 2025-05-12 PROCEDURE — 86886 COOMBS TEST INDIRECT TITER: CPT

## 2025-05-12 PROCEDURE — 86999 UNLISTED TRANSFUSION MED PX: CPT

## 2025-05-12 PROCEDURE — 86870 RBC ANTIBODY IDENTIFICATION: CPT

## 2025-05-12 NOTE — PROGRESS NOTES
Pt here for labwork/Rhogam work up. Previous Anti-D positive (<1); repeat titer drawn per MFM request. Per MFM, Rhogam not indicated if titer returns positive. Results should be available in approximately 2 days. Informed patient that I will call her to return for Rhogam injection if needed. Pt states understanding. Informed Dr. Colón.

## 2025-05-13 LAB — ANTIBODY TITER: 64

## 2025-05-14 ENCOUNTER — OFFICE VISIT (OUTPATIENT)
Dept: MATERNAL FETAL MEDICINE | Facility: CLINIC | Age: 35
End: 2025-05-14
Payer: MEDICAID

## 2025-05-14 ENCOUNTER — PROCEDURE VISIT (OUTPATIENT)
Dept: MATERNAL FETAL MEDICINE | Facility: CLINIC | Age: 35
End: 2025-05-14
Payer: MEDICAID

## 2025-05-14 VITALS
WEIGHT: 191.56 LBS | BODY MASS INDEX: 30.79 KG/M2 | SYSTOLIC BLOOD PRESSURE: 127 MMHG | DIASTOLIC BLOOD PRESSURE: 86 MMHG | HEIGHT: 66 IN | HEART RATE: 84 BPM

## 2025-05-14 DIAGNOSIS — O09.899 HISTORY OF MATERNAL SYPHILIS, CURRENTLY PREGNANT: ICD-10-CM

## 2025-05-14 DIAGNOSIS — O36.0120 ANTI-D ANTIBODIES PRESENT DURING PREGNANCY IN SECOND TRIMESTER, SINGLE OR UNSPECIFIED FETUS: Primary | ICD-10-CM

## 2025-05-14 DIAGNOSIS — O09.523 AMA (ADVANCED MATERNAL AGE) MULTIGRAVIDA 35+, THIRD TRIMESTER: ICD-10-CM

## 2025-05-14 DIAGNOSIS — O10.919 CHRONIC HYPERTENSION AFFECTING PREGNANCY: Primary | ICD-10-CM

## 2025-05-14 DIAGNOSIS — O36.0130 ANTI-D ANTIBODIES PRESENT DURING PREGNANCY IN THIRD TRIMESTER, SINGLE OR UNSPECIFIED FETUS: Primary | ICD-10-CM

## 2025-05-14 DIAGNOSIS — O10.919 CHRONIC HYPERTENSION AFFECTING PREGNANCY: ICD-10-CM

## 2025-05-14 NOTE — PROGRESS NOTES
"Maternal Fetal Medicine follow up consult    SUBJECTIVE:     Savanah Pang is a 35 y.o.  female with IUP at 29w0d who is seen in follow up consultation by MFM.  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  Critical anti-D titer resulted yesterday (1:64). Presents today for repeat eval/change in plan of care.  Denies LOF, VB, contractions. Positive fetal movement.    Previous notes reviewed.   No changes to medical, surgical, family, social, or obstetric history.  Interval history since last MFM visit: see above  Medications reviewed.    Care team members:  [facility] - Primary OB     OBJECTIVE:   /86 (BP Location: Right arm, Patient Position: Sitting)   Pulse 84   Ht 5' 6" (1.676 m)   Wt 86.9 kg (191 lb 9.3 oz)   LMP 10/23/2024 (Exact Date)   BMI 30.92 kg/m²     Ultrasound performed. See viewpoint for full ultrasound report.    A viable davies pregnancy is seen in cephalic presentation. No abnormalities are seen (no hydrops). Amniotic fluid volume is normal. Placenta is anterior. Biophysical profile is 8/8. MCA Dopplers are normal at 1.1 MoMs.     ASSESSMENT/PLAN:     35 y.o.  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   We discussed the pathophysiology of antibody sensitization as well as the risk of fetal anemia and hydrops. HDFN does not usually occur until a critical titer is reached (1:16 at [facility]).     3/10/25- titer: 1  25- titer <1.  5 - due for repeat - ordered  - critical titer 1:64 returned yesterday. MCAs today normal (1.1 " MoM)    Recommendations:  FOB- Presumed positive at least heterozygous (he is father of all their children).  Fetal surveillance by assessment of the MCA peak systolic velocity (PSV) every week by MFM.  If the MCA PSV reaches > 1.5 MoM, fetal blood sampling and transfusion may be necessary. This will be coordinated by MFM     After reaching the critical titer for MCA PSV screening, it is no longer necessary to perform serial antibody titers.  If the fetus is at risk for HDFN (i.e.: undergoing MCA surveillance)  Antepartum testing w/ MFM  Delivery between 37 0/7 - 37 6/7 weeks if no transfusion needed.   Individualization of delivery timing would occur if transfusion is required. Because data are limited regarding timing of delivery, each case will be individualized by the MFM team.  If critical titer is not found, delivery at 39 weeks may be undertaken with no PNT indicated.  Consider maternal blood cross-matching at admission for delivery (if rare antibody and increased risk of hemorrhage) due to potential delay in finding matched units.     Please note that this management scheme assumes that the patients partner is indeed the father of the baby.        F/u once weekly MCA/BPP  F/u in 3 weeks for MFM visit /growth ultrasound    Margarette Valdez MD  Maternal Fetal Medicine

## 2025-05-14 NOTE — ASSESSMENT & PLAN NOTE
There are > 50 different red cell antigens that have been associated with hemolytic disease of the fetus and  (HDFN), but the antibodies frequently associated with severe HDFN include those against RhD, Rhc, and [place] (K1). 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   We discussed the pathophysiology of antibody sensitization as well as the risk of fetal anemia and hydrops. HDFN does not usually occur until a critical titer is reached (1:16 at [facility]).     3/10/25- titer: 1  25- titer <1.  57 - due for repeat - ordered  - critical titer 1:64 returned yesterday. MCAs today normal (1.1 MoM)    Recommendations:  FOB- Presumed positive at least heterozygous (he is father of all their children).  Fetal surveillance by assessment of the MCA peak systolic velocity (PSV) every week by MFM.  If the MCA PSV reaches > 1.5 MoM, fetal blood sampling and transfusion may be necessary. This will be coordinated by MFM     After reaching the critical titer for MCA PSV screening, it is no longer necessary to perform serial antibody titers.  If the fetus is at risk for HDFN (i.e.: undergoing MCA surveillance)  Antepartum testing w/ MFM  Delivery between 37 0/7 - 37 6/7 weeks if no transfusion needed.   Individualization of delivery timing would occur if transfusion is required. Because data are limited regarding timing of delivery, each case will be individualized by the MFM team.  If critical titer is not found, delivery at 39 weeks may be undertaken with no PNT indicated.  Consider maternal blood cross-matching at admission for delivery (if rare antibody and increased risk of hemorrhage) due to potential delay in finding matched units.     Please note that this management scheme assumes that the patients partner is indeed the father of the baby.

## 2025-05-19 DIAGNOSIS — Q75.8 CONGENITAL HYPOPLASIA OF NASAL BONE: ICD-10-CM

## 2025-05-19 DIAGNOSIS — O09.523 AMA (ADVANCED MATERNAL AGE) MULTIGRAVIDA 35+, THIRD TRIMESTER: ICD-10-CM

## 2025-05-19 DIAGNOSIS — O36.0130 ANTI-D ANTIBODIES PRESENT DURING PREGNANCY IN THIRD TRIMESTER, SINGLE OR UNSPECIFIED FETUS: Primary | ICD-10-CM

## 2025-05-19 DIAGNOSIS — O10.919 CHRONIC HYPERTENSION AFFECTING PREGNANCY: ICD-10-CM

## 2025-05-21 ENCOUNTER — PROCEDURE VISIT (OUTPATIENT)
Dept: MATERNAL FETAL MEDICINE | Facility: CLINIC | Age: 35
End: 2025-05-21
Payer: MEDICAID

## 2025-05-21 ENCOUNTER — PATIENT MESSAGE (OUTPATIENT)
Dept: MATERNAL FETAL MEDICINE | Facility: CLINIC | Age: 35
End: 2025-05-21
Payer: MEDICAID

## 2025-05-21 ENCOUNTER — OFFICE VISIT (OUTPATIENT)
Dept: MATERNAL FETAL MEDICINE | Facility: CLINIC | Age: 35
End: 2025-05-21
Payer: MEDICAID

## 2025-05-21 VITALS
BODY MASS INDEX: 31.2 KG/M2 | HEART RATE: 73 BPM | HEART RATE: 96 BPM | DIASTOLIC BLOOD PRESSURE: 95 MMHG | HEIGHT: 66 IN | SYSTOLIC BLOOD PRESSURE: 137 MMHG | DIASTOLIC BLOOD PRESSURE: 89 MMHG | SYSTOLIC BLOOD PRESSURE: 139 MMHG | WEIGHT: 194.13 LBS

## 2025-05-21 DIAGNOSIS — Q75.8 CONGENITAL HYPOPLASIA OF NASAL BONE: ICD-10-CM

## 2025-05-21 DIAGNOSIS — O36.1130 ISOIMMUNIZATION FROM BLOOD GROUP INCOMPATIBILITY DURING PREGNANCY IN THIRD TRIMESTER, SINGLE OR UNSPECIFIED FETUS: Primary | ICD-10-CM

## 2025-05-21 DIAGNOSIS — Z36.9 ENCOUNTER FOR FETAL ULTRASOUND: ICD-10-CM

## 2025-05-21 DIAGNOSIS — O36.0130 ANTI-D ANTIBODIES PRESENT DURING PREGNANCY IN THIRD TRIMESTER, SINGLE OR UNSPECIFIED FETUS: Primary | ICD-10-CM

## 2025-05-21 DIAGNOSIS — O09.899 HISTORY OF MATERNAL SYPHILIS, CURRENTLY PREGNANT: ICD-10-CM

## 2025-05-21 DIAGNOSIS — O36.0130 ANTI-D ANTIBODIES PRESENT DURING PREGNANCY IN THIRD TRIMESTER, SINGLE OR UNSPECIFIED FETUS: ICD-10-CM

## 2025-05-21 DIAGNOSIS — O10.919 CHRONIC HYPERTENSION AFFECTING PREGNANCY: ICD-10-CM

## 2025-05-21 NOTE — ASSESSMENT & PLAN NOTE
There are > 50 different red cell antigens that have been associated with hemolytic disease of the fetus and  (HDFN), but the antibodies frequently associated with severe HDFN include those against RhD, Rhc, and [place] (K1). 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   We discussed the pathophysiology of antibody sensitization as well as the risk of fetal anemia and hydrops. HDFN does not usually occur until a critical titer is reached (1:16 at [facility]).     3/10/25- titer: 1  25- titer <1.   - due for repeat - ordered  - critical titer 1:64 returned yesterday. MCAs today normal (1.1 MoM)  - MCAs 1.59 MoM.    Recommendations:  FOB- Presumed positive at least heterozygous (he is father of all their children).  Fetal surveillance by assessment of the MCA peak systolic velocity (PSV) every week by MFM.  If the MCA PSV reaches > 1.5 MoM, fetal blood sampling and transfusion may be necessary. This will be coordinated by MFM at [facility]  After reaching the critical titer for MCA PSV screening, it is no longer necessary to perform serial antibody titers.  If the fetus is at risk for HDFN (i.e.: undergoing MCA surveillance)  Antepartum testing w/ MFM  Delivery between 37 0/7 - 37 6/7 weeks if no transfusion needed.   Individualization of delivery timing would occur if transfusion is required. Because data are limited regarding timing of delivery, each case will be individualized by the MFM team.  Consider maternal blood cross-matching at admission for delivery (if rare antibody and increased risk of hemorrhage) due to potential delay in finding matched units.     Please note that this management scheme assumes that the patients partner is indeed the father of the baby.

## 2025-05-21 NOTE — PROGRESS NOTES
"Maternal Fetal Medicine follow up consult    SUBJECTIVE:     Savanah Pang is a 35 y.o.  female with IUP at 30w0d who is seen in follow up consultation by MFM.  Pregnancy complications include:   Problem   - Congenital hypoplasia of nasal bone   - Chronic hypertension affecting pregnancy   - History of maternal syphilis, currently pregnant       Savanah presents for routine follow up appointment.  Critical anti-D titer. MCA Dopplers elevated today.  Denies LOF, VB, contractions. Positive fetal movement.    Previous notes reviewed.   No changes to medical, surgical, family, social, or obstetric history.  Interval history since last MFM visit: see above  Medications reviewed.    Care team members:  [facility] - Primary OB     OBJECTIVE:   BP (!) 139/95 (BP Location: Left arm, Patient Position: Sitting)   Pulse 73   Ht 5' 6" (1.676 m)   Wt 88 kg (194 lb 1.8 oz)   LMP 10/23/2024 (Exact Date)   Breastfeeding Unknown   BMI 31.33 kg/m²     Ultrasound performed. See viewpoint for full ultrasound report.    A viable davies pregnancy is seen in cephalic presentation. No abnormalities are seen (no hydrops). Amniotic fluid volume is normal. Placenta is anterior. Biophysical profile is 8/8. MCA Dopplers are elevated (new) at 1.59.    ASSESSMENT/PLAN:     35 y.o.  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   We discussed the pathophysiology of antibody sensitization as well as the risk of fetal anemia and hydrops. HDFN does not usually occur until a critical titer is reached (1:16 at [facility]).     3/10/25- titer: 1  25- titer <1.   - due for repeat - ordered  - critical titer 1:64 " returned yesterday. MCAs today normal (1.1 MoM)  - MCAs 1.59 MoM.    Recommendations:  FOB- Presumed positive at least heterozygous (he is father of all their children).  Fetal surveillance by assessment of the MCA peak systolic velocity (PSV) every week by MFM.  If the MCA PSV reaches > 1.5 MoM, fetal blood sampling and transfusion may be necessary. This will be coordinated by MFM at [facility]  After reaching the critical titer for MCA PSV screening, it is no longer necessary to perform serial antibody titers.  If the fetus is at risk for HDFN (i.e.: undergoing MCA surveillance)  Antepartum testing w/ MFM  Delivery between 37 0/7 - 37 6/7 weeks if no transfusion needed.   Individualization of delivery timing would occur if transfusion is required. Because data are limited regarding timing of delivery, each case will be individualized by the MFM team.  Consider maternal blood cross-matching at admission for delivery (if rare antibody and increased risk of hemorrhage) due to potential delay in finding matched units.     Please note that this management scheme assumes that the patients partner is indeed the father of the baby.        - Chronic hypertension affecting pregnancy  Previously counseled the patient on maternal/fetal risks associated with CHTN during pregnancy. Risks include but not limited to fetal growth restriction, miscarriage, abruption, maternal end organ disease (renal failure, MI, and stroke),  delivery, development of superimposed preeclampsia, and eclampsia.     3/10/25- Currently taking Nifedipine 30mg XL BID and Labetalol 200mg BID  3/19/25- /76. Doing well without complaints.   25-BP has been well controlled since adjusting Nifedipine on 3/10. BP today 111/73  25- Doing very well. Asymptomatic. /73  25- mild range blood pressures today.  Due for her home regimen.  We will re-evaluate while she is under consideration for pubs.    Recommendations (Please  refer to [facility] guidelines):  -Continue aspirin 81 mg daily for preeclampsia risk reduction  -Continue current medications: Nifedipine to 60mg XL BID and Labetalol 200mg BID  -Baseline evaluation with primary OB:   24-hour urine protein or baseline P/C ratio, CMP, and CBC (completed)  Maternal EKG  Maternal ophthalmic evaluation  Maternal echocardiogram if HTN has been long-standing or EKG is abnormal  -Serial fetal growth ultrasounds every 4-6 weeks, beginning at 26-28 weeks.   -Continued close observation of patient's blood pressures. Avoid hypotension as this has been associated with uteroplacental insufficiency.  -In conjunction with the CHAP study recommendation for BP control: If BP is persistently >=140/90 antihypertensive medication is recommended with goal BP of 120-140/80-90.  -Weekly antepartum testing at 32 weeks (NST+AFV); twice weekly testing if control is poor, multiple comorbidities are present, or requires several medications for control   -Delivery timing:  No medications, no comorbid conditions: 39 0/7 - 39 6/7 weeks gestation  No medications, comorbid conditions: 38 0/7 - 38 6/7 weeks gestation  Controlled on single agent, no comorbid conditions: 38 0/7 - 38 6/7 weeks gestation  Controlled on single agent, comorbid conditions: 37 0/7 - 38 6/7 weeks gestation  Uncontrolled or requiring >= 2 medications: 36 0/7 - 37 6/7 weeks gestation    Comorbid conditions include BMI >= 40, diabetes, and complex medical condition associated with placental dysfunction (ie lupus or other vascular disease)  Delivery may be recommended earlier pending results of fetal growth ultrasounds, AFV assessment, or antepartum testing results.      - History of maternal syphilis, currently pregnant  Syphilis is a sexually and congenitally transmitted disease caused by the spirochete Treponema pallidum. Early infections can be found due to symptoms (such as chancre, skin rash, lymphadenopathy, etc.) or due to recent  seroconversion. Latent syphilis acquired within the preceding year is referred to as early latent syphilis; all other cases of latent syphilis are late latent syphilis or syphilis of unknown duration. T. pallidum can infect the central nervous system and result in neurosyphilis, which can occur at any stage of syphilis. Early neurologic clinical manifestations (i.e., cranial nerve dysfunction, meningitis, stroke, acute altered mental status, and auditory or ophthalmic abnormalities) are usually present within the first few months or years of infection. Late neurologic manifestations (i.e., tabes dorsalis and general paresis) occur 10-30 years after infection.     Received full treatment in 2017 (partner also). Repeat RPR in first trimester: nonreactive.     Recommendations  - recheck RPR in third trimester  - already treated with PCN   - serial growth u/s    - Congenital hypoplasia of nasal bone  A hypoplastic nasal bone is associated with an increased risk of Down syndrome when isolated. It increases the chance of Down Syndrome 6.6 times. It is estimated that 0.1-1.2% of euploid fetuses have a hypoplastic nasal bone. Hypoplasia of the nasal bone also appears to be more common in fetuses of Afro-Michael women (up to 9%) than those of  women (0.5%). The identification of an absent or hypoplastic nasal bone can be associated with Down syndrome or may be a normal variant. Assuming there is not an underlying chromosomal abnormality, genetic syndrome, or other fetal abnormalities, hypoplastic or absent nasal bone is most likely a normal variant. After a finding of isolated absent nasal bone on ultrasound cfDNA screening vs amniocentesis are recommended. If cfDNA has already been completed and is normal no further testing is recommended.    Two different categories of tests are available in pregnancy to assess for fetal chromosomal abnormalities after a finding of hypoplastic or absent nasal bone, screening  tests and diagnostic tests. Screening tests do not provide a definitive answer as to whether a fetus is affected with a chromosome disorder. Rather, screening tests indicate an increased or decreased chance of whether a pregnancy is affected by a condition. cfDNA screening is a recommended option after an isolated absent nasal bone is identified on ultrasound. In all people, small pieces of genetic material (DNA) that are not contained within cells are present in the blood stream. During pregnancy, these small pieces of DNA are a mixture of the mother's DNA and DNA shed from the placenta. This test can indicate if there is an abnormal number of chromosomes 21, 18, 13, X, and Y. The approximate detection rate is 99% for Trisomy 21, 98% for Trisomy 18, 91% for Trisomy 13, and 90% for Monroe Syndrome. The false positive rates are low ranging from 1/1000 to 2/1000.    Screening tests are not definitive. If the test indicates an increased risk for a chromosome problem, it is recommended to confirm with a diagnostic test such as amniocentesis or CVS. Alternatively, a low risk or negative result on a screening test is reassuring, but it does not eliminate the possibility that the fetus is affected with the condition.    We then reviewed diagnostic testing options. Unlike screening tests, diagnostic tests provide definitive answers regarding the presence of fetal chromosome abnormalities. In addition to assessing for the presence of the common fetal aneuploidies, diagnostic tests can assess for abnormalities in the number and structure of all chromosomes. While both procedures are generally quite safe, there is a risk of miscarriage associated with these procedures. For genetic amniocentesis, the estimated risk of miscarriage attributable to the procedure is 1 in 900.    At the end of our consultation, the patient politely declined further invasive testing given negative quad screen.       F/u [facility] [place]  tomorrow,  possible PUBS    Margarette Valdez MD  Maternal Fetal Medicine

## 2025-05-21 NOTE — ASSESSMENT & PLAN NOTE
Previously counseled the patient on maternal/fetal risks associated with CHTN during pregnancy. Risks include but not limited to fetal growth restriction, miscarriage, abruption, maternal end organ disease (renal failure, MI, and stroke),  delivery, development of superimposed preeclampsia, and eclampsia.     3/10/25- Currently taking Nifedipine 30mg XL BID and Labetalol 200mg BID  3/19/25- /76. Doing well without complaints.   25-BP has been well controlled since adjusting Nifedipine on 3/10. BP today 111/73  25- Doing very well. Asymptomatic. /73  25- mild range blood pressures today.  Due for her home regimen.  We will re-evaluate while she is under consideration for pubs.    Recommendations (Please refer to [facility] guidelines):  -Continue aspirin 81 mg daily for preeclampsia risk reduction  -Continue current medications: Nifedipine to 60mg XL BID and Labetalol 200mg BID  -Baseline evaluation with primary OB:   24-hour urine protein or baseline P/C ratio, CMP, and CBC (completed)  Maternal EKG  Maternal ophthalmic evaluation  Maternal echocardiogram if HTN has been long-standing or EKG is abnormal  -Serial fetal growth ultrasounds every 4-6 weeks, beginning at 26-28 weeks.   -Continued close observation of patient's blood pressures. Avoid hypotension as this has been associated with uteroplacental insufficiency.  -In conjunction with the CHAP study recommendation for BP control: If BP is persistently >=140/90 antihypertensive medication is recommended with goal BP of 120-140/80-90.  -Weekly antepartum testing at 32 weeks (NST+AFV); twice weekly testing if control is poor, multiple comorbidities are present, or requires several medications for control   -Delivery timing:  No medications, no comorbid conditions: 39 0/7 - 39 6/7 weeks gestation  No medications, comorbid conditions: 38 0/7 - 38 6/7 weeks gestation  Controlled on single agent, no comorbid conditions: 38 0/7 - 38 6/7  weeks gestation  Controlled on single agent, comorbid conditions: 37 0/7 - 38 6/7 weeks gestation  Uncontrolled or requiring >= 2 medications: 36 0/7 - 37 6/7 weeks gestation    Comorbid conditions include BMI >= 40, diabetes, and complex medical condition associated with placental dysfunction (ie lupus or other vascular disease)  Delivery may be recommended earlier pending results of fetal growth ultrasounds, AFV assessment, or antepartum testing results.

## 2025-05-22 ENCOUNTER — OFFICE VISIT (OUTPATIENT)
Dept: MATERNAL FETAL MEDICINE | Facility: CLINIC | Age: 35
End: 2025-05-22
Payer: MEDICAID

## 2025-05-22 ENCOUNTER — HOSPITAL ENCOUNTER (EMERGENCY)
Facility: OTHER | Age: 35
Discharge: HOME | End: 2025-05-22
Attending: OBSTETRICS & GYNECOLOGY
Payer: MEDICAID

## 2025-05-22 ENCOUNTER — PROCEDURE VISIT (OUTPATIENT)
Dept: MATERNAL FETAL MEDICINE | Facility: CLINIC | Age: 35
End: 2025-05-22
Payer: MEDICAID

## 2025-05-22 VITALS
HEART RATE: 82 BPM | OXYGEN SATURATION: 99 % | SYSTOLIC BLOOD PRESSURE: 127 MMHG | RESPIRATION RATE: 16 BRPM | TEMPERATURE: 98 F | DIASTOLIC BLOOD PRESSURE: 76 MMHG

## 2025-05-22 VITALS
DIASTOLIC BLOOD PRESSURE: 104 MMHG | WEIGHT: 191.38 LBS | SYSTOLIC BLOOD PRESSURE: 148 MMHG | BODY MASS INDEX: 30.89 KG/M2

## 2025-05-22 DIAGNOSIS — Z36.9 ENCOUNTER FOR FETAL ULTRASOUND: ICD-10-CM

## 2025-05-22 DIAGNOSIS — I10 CHRONIC HYPERTENSION: Primary | ICD-10-CM

## 2025-05-22 DIAGNOSIS — O36.0130 ANTI-D ANTIBODIES PRESENT DURING PREGNANCY IN THIRD TRIMESTER, SINGLE OR UNSPECIFIED FETUS: Primary | ICD-10-CM

## 2025-05-22 DIAGNOSIS — Z3A.30 30 WEEKS GESTATION OF PREGNANCY: ICD-10-CM

## 2025-05-22 DIAGNOSIS — O36.0130 ANTI-D ANTIBODIES PRESENT DURING PREGNANCY IN THIRD TRIMESTER, SINGLE OR UNSPECIFIED FETUS: ICD-10-CM

## 2025-05-22 DIAGNOSIS — O36.5990 FETAL GROWTH RESTRICTION ANTEPARTUM: ICD-10-CM

## 2025-05-22 DIAGNOSIS — O10.919 CHRONIC HYPERTENSION AFFECTING PREGNANCY: ICD-10-CM

## 2025-05-22 LAB
ABSOLUTE EOSINOPHIL (OHS): 0.02 K/UL
ABSOLUTE MONOCYTE (OHS): 0.53 K/UL (ref 0.3–1)
ABSOLUTE NEUTROPHIL COUNT (OHS): 6.51 K/UL (ref 1.8–7.7)
ALBUMIN SERPL BCP-MCNC: 3.3 G/DL (ref 3.5–5.2)
ALP SERPL-CCNC: 98 UNIT/L (ref 40–150)
ALT SERPL W/O P-5'-P-CCNC: 14 UNIT/L (ref 10–44)
ANION GAP (OHS): 8 MMOL/L (ref 8–16)
AST SERPL-CCNC: 22 UNIT/L (ref 11–45)
BASOPHILS # BLD AUTO: 0.02 K/UL
BASOPHILS NFR BLD AUTO: 0.2 %
BILIRUB SERPL-MCNC: 0.4 MG/DL (ref 0.1–1)
BUN SERPL-MCNC: 11 MG/DL (ref 6–20)
CALCIUM SERPL-MCNC: 9.4 MG/DL (ref 8.7–10.5)
CHLORIDE SERPL-SCNC: 107 MMOL/L (ref 95–110)
CO2 SERPL-SCNC: 20 MMOL/L (ref 23–29)
CREAT SERPL-MCNC: 0.8 MG/DL (ref 0.5–1.4)
CREAT UR-MCNC: 241 MG/DL (ref 15–325)
ERYTHROCYTE [DISTWIDTH] IN BLOOD BY AUTOMATED COUNT: 12.4 % (ref 11.5–14.5)
GFR SERPLBLD CREATININE-BSD FMLA CKD-EPI: >60 ML/MIN/1.73/M2
GLUCOSE SERPL-MCNC: 71 MG/DL (ref 70–110)
HCT VFR BLD AUTO: 34.7 % (ref 37–48.5)
HGB BLD-MCNC: 11.8 GM/DL (ref 12–16)
IMM GRANULOCYTES # BLD AUTO: 0.02 K/UL (ref 0–0.04)
IMM GRANULOCYTES NFR BLD AUTO: 0.2 % (ref 0–0.5)
LYMPHOCYTES # BLD AUTO: 2.35 K/UL (ref 1–4.8)
MCH RBC QN AUTO: 29.9 PG (ref 27–31)
MCHC RBC AUTO-ENTMCNC: 34 G/DL (ref 32–36)
MCV RBC AUTO: 88 FL (ref 82–98)
NUCLEATED RBC (/100WBC) (OHS): 0 /100 WBC
PLATELET # BLD AUTO: 143 K/UL (ref 150–450)
PMV BLD AUTO: 10.8 FL (ref 9.2–12.9)
POTASSIUM SERPL-SCNC: 3.9 MMOL/L (ref 3.5–5.1)
PROT SERPL-MCNC: 7.2 GM/DL (ref 6–8.4)
PROT UR-MCNC: 18 MG/DL
PROT/CREAT UR: 0.07 MG/G{CREAT}
RBC # BLD AUTO: 3.94 M/UL (ref 4–5.4)
RELATIVE EOSINOPHIL (OHS): 0.2 %
RELATIVE LYMPHOCYTE (OHS): 24.9 % (ref 18–48)
RELATIVE MONOCYTE (OHS): 5.6 % (ref 4–15)
RELATIVE NEUTROPHIL (OHS): 68.9 % (ref 38–73)
SODIUM SERPL-SCNC: 135 MMOL/L (ref 136–145)
WBC # BLD AUTO: 9.45 K/UL (ref 3.9–12.7)

## 2025-05-22 PROCEDURE — 76818 FETAL BIOPHYS PROFILE W/NST: CPT | Mod: PBBFAC | Performed by: OBSTETRICS & GYNECOLOGY

## 2025-05-22 PROCEDURE — 76820 UMBILICAL ARTERY ECHO: CPT | Mod: PBBFAC | Performed by: OBSTETRICS & GYNECOLOGY

## 2025-05-22 PROCEDURE — 82570 ASSAY OF URINE CREATININE: CPT

## 2025-05-22 PROCEDURE — 82247 BILIRUBIN TOTAL: CPT

## 2025-05-22 PROCEDURE — 99213 OFFICE O/P EST LOW 20 MIN: CPT | Mod: PBBFAC,TH | Performed by: OBSTETRICS & GYNECOLOGY

## 2025-05-22 PROCEDURE — 76816 OB US FOLLOW-UP PER FETUS: CPT | Mod: PBBFAC | Performed by: OBSTETRICS & GYNECOLOGY

## 2025-05-22 PROCEDURE — 99999 PR PBB SHADOW E&M-EST. PATIENT-LVL III: CPT | Mod: PBBFAC,,, | Performed by: OBSTETRICS & GYNECOLOGY

## 2025-05-22 PROCEDURE — 59025 FETAL NON-STRESS TEST: CPT

## 2025-05-22 PROCEDURE — 85025 COMPLETE CBC W/AUTO DIFF WBC: CPT

## 2025-05-22 PROCEDURE — 99284 EMERGENCY DEPT VISIT MOD MDM: CPT | Mod: 25,27

## 2025-05-22 PROCEDURE — 76821 MIDDLE CEREBRAL ARTERY ECHO: CPT | Mod: PBBFAC | Performed by: OBSTETRICS & GYNECOLOGY

## 2025-05-22 NOTE — PROGRESS NOTES
MATERNAL-FETAL MEDICINE   CONSULT NOTE    Provider requesting consultation: José Miguel/Anastacio  Patient is accompanied by her fiancee and child     SUBJECTIVE:     Ms. Savanah Pang is a 35 y.o.  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 She is overall feeling well.  Patient denies any contractions/cramping, vaginal bleeding or leakage of fluid.  She reports good fetal movement.  Patient denies any headaches, blurry vision or RUQ pain.         Medication List with Changes/Refills   Current Medications    ASPIRIN (ECOTRIN) 81 MG EC TABLET    Take 1 tablet (81 mg total) by mouth once daily.    LABETALOL (NORMODYNE) 200 MG TABLET    Take 1 tablet (200 mg total) by mouth 2 (two) times a day.    LANSOPRAZOLE (PREVACID) 15 MG CAPSULE    Take 1 capsule (15 mg total) by mouth once daily.    NIFEDIPINE (PROCARDIA-XL) 60 MG (OSM) 24 HR TABLET    Take 1 tablet (60 mg total) by mouth 2 (two) times a day.    PNV,CALCIUM 72-IRON-FOLIC ACID (PRENATAL VITAMIN PLUS LOW IRON) 27 MG IRON- 1 MG TAB    Take 1 tablet (1 each total) by mouth once daily.      Review of patient's allergies indicates:  No Known Allergies    PMH: Past Medical History[1]   PObHx:  OB History    Para Term  AB Living   6 5 5   5   SAB IAB Ectopic Multiple Live Births       5      # Outcome Date GA Lbr Corby/2nd Weight Sex Type Anes PTL Lv   6 Current            5 Term 01/10/24 37w0d  2.863 kg (6 lb 5 oz) M Vag-Spont   ERIKA   4 Term 18 38w0d  2.665 kg (5 lb 14 oz) F Vag-Spont   ERIKA   3 Term 14 38w0d  2.92 kg (6 lb 7 oz) F Vag-Spont   ERIKA   2 Term 10/20/10 38w0d  3.147 kg (6 lb 15 oz) M Vag-Spont   ERIKA   1 Term 10/28/09 40w0d  2.863 kg (6 lb 5 oz) F Vag-Spont  N ERIKA     PSH:Past Surgical  History[2]     Family history:family history includes Hypertension in her mother; No Known Problems in her father.    Social history: reports that she has quit smoking. Her smoking use included cigarettes. She has never been exposed to tobacco smoke. She has never used smokeless tobacco. She reports that she does not currently use alcohol. She reports that she does not use drugs.    Objective:   BP (!) 148/104 (BP Location: Right arm, Patient Position: Sitting)   Wt 86.8 kg (191 lb 5.8 oz)   LMP 10/23/2024 (Exact Date)   BMI 30.89 kg/m²     Physical Exam  deferred    Ultrasound performed. See viewpoint for full ultrasound report.  Lainez live IUP  Fetal size is consistent with fetal growth restriction, with the EFW plotting at the 17% (1261 g) and the AC plotting at the 9%.   A limited repeat fetal anatomic survey appears normal.   The BPP score is normal at 8/8.  The MVP is normal.  Umbilical artery dopplers are normal with persistent forward flow, S/D ratio 89%  cephalic presentation  Middle cerebral artery Doppler peak systolic velocity measures normal at 1.33 MoM    Significant labs/imaging:  Aneuploidy screen: Quad screen low risk    ASSESSMENT/PLAN:     35 y.o.  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  She was referred to [facility] for further evaluation in the setting of Anti-D titers of 1:64 and a newly elevated MCA doppler yesterday of 1.59.    3/10/25- titer: 1  25- titer <1.   - due for repeat - ordered  - critical titer 1:64 returned yesterday. MCAs today normal (1.1 MoM)  - MCAs 1.59 MoM.    We reviewed today's ultrasound in detail. MCA dopplers today are reassuring at 1.33MoM. We reviewed the limitations of measurements and the fact that this is a screening test for fetal anemia. At this time, I would not  recommend a PUBS unless the MCA dopplers do return to >1.5MoM. We briefly reviewed R/B of PUBS/IUT and indications for this.    Recommendations:  Fetal surveillance by assessment of the MCA peak systolic velocity (PSV) every week by MFM. Given recent elevation, would recommend reevaluation early next week with Dr. Valdez. If the MCA PSV reaches > 1.5 MoM, please reach out once again so that she can be reevalauted here.    Fetal growth restriction antepartum  The patients fetus has been diagnosed with FGR based on AC < 10th percentile (9%). EFW borderline at 17%. Potential etiologies of FGR include but are not limited to normal variation of stature, placental insufficiency, chromosomal abnormalities, genetic disorders, infections, medical conditions, teratogen exposure and other etiologies. Pregnancies complicated by FGR are at increased risk of stillbirth. We discussed delivery timing and recommendations for antepartum testing. FGR, by itself, is not an indication for  delivery, although there is an increased risk of needing a  due to heart rate abnormalities. Mode of delivery will be dictated by overall clinical status and doppler abnormalities (if any).     Recommendations:  Start twice weekly  fetal surveillance with NST and BPP until delivery.   Start weekly UA dopplers.   Repeat fetal growth ultrasound in 3 weeks  All these can be scheduled arranged with local MFM/OB.  Patient counseled re: fetal movement monitoring and decreased fetal movement  Monitor closely for any signs of evolving preeclampsia (see below).  If  testing is non-reassuring, delivery may be warranted regardless of GA.  For all pregnancies with FGR, the placenta should be sent to pathology for examination.  Mode of delivery will be dictated by overall clinical status and doppler abnormalities (if any).     - Chronic hypertension affecting pregnancy  Please see prior consults for full counseling and  recommendations     Patient currently on Nifedipine 60XL BID and Labetalol 200mg BID.  She reports taking both medications on time today. She is currently asymptomatic.  Elevated BPs noted in clinic today - 156/110, 151/106, 148/104.  Patient reports overall feeling well with no complaints. She reports that these are elevated compared to her baseline and was surprised to hear them being as high as they are.    Given these new elevations, I recommend she be evaluated in the [facility] to ensure no other pathology present, especially in the setting of new FGR.  Patient and daniellee voiced understanding and are amenable to this.         Patient was counseled that prenatal ultrasound studies have limitations. They do not detect all fetal, genetic, placental, and maternal abnormalities.       FOLLOW UP: No further ultrasounds or visits were scheduled.       The patient was given an opportunity to ask questions about the management of her high risk pregnancy problems. She expressed an understanding of and agreement to the above impression and plan. All questions were answered to her satisfaction.        Shakeel Maciel MD   Maternal-Fetal Medicine      Electronically Signed by Shakeel Maciel May 22, 2025       [1]   Past Medical History:  Diagnosis Date    Hypertension    [2] No past surgical history on file.

## 2025-05-22 NOTE — ASSESSMENT & PLAN NOTE
Please see prior consults for full counseling and recommendations     Patient currently on Nifedipine 60XL BID and Labetalol 200mg BID.  She reports taking both medications on time today. She is currently asymptomatic.  Elevated BPs noted in clinic today - 156/110, 151/106, 148/104.  Patient reports overall feeling well with no complaints. She reports that these are elevated compared to her baseline and was surprised to hear them being as high as they are.    Given these new elevations, I recommend she be evaluated in the [facility] to ensure no other pathology present, especially in the setting of new FGR.  Patient and felisa voiced understanding and are amenable to this.

## 2025-05-22 NOTE — ASSESSMENT & PLAN NOTE
Please see prior consults for full counseling and recommendations.  She was referred to MFM at [facility] for further evaluation in the setting of Anti-D titers of 1:64 and a newly elevated MCA doppler yesterday of 1.59.    3/10/25- titer: 1  4/7/25- titer <1.  5/7 - due for repeat - ordered  5/14- critical titer 1:64 returned yesterday. MCAs today normal (1.1 MoM)  5/21- MCAs 1.59 MoM.    We reviewed today's ultrasound in detail. MCA dopplers today are reassuring at 1.33MoM. We reviewed the limitations of measurements and the fact that this is a screening test for fetal anemia. At this time, I would not recommend a PUBS unless the MCA dopplers do return to >1.5MoM. We briefly reviewed R/B of PUBS/IUT and indications for this.    Recommendations:  Fetal surveillance by assessment of the MCA peak systolic velocity (PSV) every week by MFM. Given recent elevation, would recommend reevaluation early next week with Dr. Valdez. If the MCA PSV reaches > 1.5 MoM, please reach out once again so that she can be reevalauted here.

## 2025-05-22 NOTE — ASSESSMENT & PLAN NOTE
The patients fetus has been diagnosed with FGR based on AC < 10th percentile (9%). EFW borderline at 17%. Potential etiologies of FGR include but are not limited to normal variation of stature, placental insufficiency, chromosomal abnormalities, genetic disorders, infections, medical conditions, teratogen exposure and other etiologies. Pregnancies complicated by FGR are at increased risk of stillbirth. We discussed delivery timing and recommendations for antepartum testing. FGR, by itself, is not an indication for  delivery, although there is an increased risk of needing a  due to heart rate abnormalities. Mode of delivery will be dictated by overall clinical status and doppler abnormalities (if any).     Recommendations:  Start twice weekly  fetal surveillance with NST and BPP until delivery.   Start weekly UA dopplers.   Repeat fetal growth ultrasound in 3 weeks  All these can be scheduled arranged with local MFM/OB.  Patient counseled re: fetal movement monitoring and decreased fetal movement  Monitor closely for any signs of evolving preeclampsia (see below).  If  testing is non-reassuring, delivery may be warranted regardless of GA.  For all pregnancies with FGR, the placenta should be sent to pathology for examination.  Mode of delivery will be dictated by overall clinical status and doppler abnormalities (if any).

## 2025-05-28 ENCOUNTER — PROCEDURE VISIT (OUTPATIENT)
Dept: MATERNAL FETAL MEDICINE | Facility: CLINIC | Age: 35
End: 2025-05-28
Payer: MEDICAID

## 2025-05-28 VITALS
WEIGHT: 195.75 LBS | SYSTOLIC BLOOD PRESSURE: 122 MMHG | BODY MASS INDEX: 31.6 KG/M2 | DIASTOLIC BLOOD PRESSURE: 80 MMHG | HEART RATE: 84 BPM

## 2025-05-28 DIAGNOSIS — O09.523 AMA (ADVANCED MATERNAL AGE) MULTIGRAVIDA 35+, THIRD TRIMESTER: ICD-10-CM

## 2025-05-28 DIAGNOSIS — O10.919 CHRONIC HYPERTENSION AFFECTING PREGNANCY: ICD-10-CM

## 2025-05-28 DIAGNOSIS — O36.0130 ANTI-D ANTIBODIES PRESENT DURING PREGNANCY IN THIRD TRIMESTER, SINGLE OR UNSPECIFIED FETUS: ICD-10-CM

## 2025-05-28 DIAGNOSIS — Q75.8 CONGENITAL HYPOPLASIA OF NASAL BONE: ICD-10-CM

## 2025-05-28 PROCEDURE — 76819 FETAL BIOPHYS PROFIL W/O NST: CPT | Mod: S$GLB,,, | Performed by: OBSTETRICS & GYNECOLOGY

## 2025-05-28 PROCEDURE — 76821 MIDDLE CEREBRAL ARTERY ECHO: CPT | Mod: S$GLB,,, | Performed by: OBSTETRICS & GYNECOLOGY

## 2025-05-28 PROCEDURE — 76820 UMBILICAL ARTERY ECHO: CPT | Mod: S$GLB,,, | Performed by: OBSTETRICS & GYNECOLOGY

## 2025-06-02 ENCOUNTER — PROCEDURE VISIT (OUTPATIENT)
Dept: MATERNAL FETAL MEDICINE | Facility: CLINIC | Age: 35
End: 2025-06-02
Payer: MEDICAID

## 2025-06-02 ENCOUNTER — OFFICE VISIT (OUTPATIENT)
Dept: MATERNAL FETAL MEDICINE | Facility: CLINIC | Age: 35
End: 2025-06-02
Payer: MEDICAID

## 2025-06-02 VITALS
BODY MASS INDEX: 31.9 KG/M2 | SYSTOLIC BLOOD PRESSURE: 143 MMHG | WEIGHT: 197.63 LBS | DIASTOLIC BLOOD PRESSURE: 102 MMHG | HEART RATE: 89 BPM

## 2025-06-02 DIAGNOSIS — O09.899 HISTORY OF MATERNAL SYPHILIS, CURRENTLY PREGNANT: ICD-10-CM

## 2025-06-02 DIAGNOSIS — O10.919 CHRONIC HYPERTENSION AFFECTING PREGNANCY: Primary | ICD-10-CM

## 2025-06-02 DIAGNOSIS — O09.523 MULTIGRAVIDA OF ADVANCED MATERNAL AGE IN THIRD TRIMESTER: ICD-10-CM

## 2025-06-02 DIAGNOSIS — O10.919 CHRONIC HYPERTENSION AFFECTING PREGNANCY: ICD-10-CM

## 2025-06-02 DIAGNOSIS — O36.5990 FETAL GROWTH RESTRICTION ANTEPARTUM: ICD-10-CM

## 2025-06-02 DIAGNOSIS — Q75.8 CONGENITAL HYPOPLASIA OF NASAL BONE: ICD-10-CM

## 2025-06-02 DIAGNOSIS — O36.0130 ANTI-D ANTIBODIES PRESENT DURING PREGNANCY IN THIRD TRIMESTER, SINGLE OR UNSPECIFIED FETUS: ICD-10-CM

## 2025-06-02 DIAGNOSIS — O26.873 SHORT CERVICAL LENGTH DURING PREGNANCY IN THIRD TRIMESTER: ICD-10-CM

## 2025-06-02 PROCEDURE — 76821 MIDDLE CEREBRAL ARTERY ECHO: CPT | Mod: S$GLB,,, | Performed by: OBSTETRICS & GYNECOLOGY

## 2025-06-02 PROCEDURE — 76820 UMBILICAL ARTERY ECHO: CPT | Mod: S$GLB,,, | Performed by: OBSTETRICS & GYNECOLOGY

## 2025-06-02 PROCEDURE — 99214 OFFICE O/P EST MOD 30 MIN: CPT | Mod: TH,S$GLB,, | Performed by: OBSTETRICS & GYNECOLOGY

## 2025-06-02 PROCEDURE — 1160F RVW MEDS BY RX/DR IN RCRD: CPT | Mod: CPTII,S$GLB,, | Performed by: OBSTETRICS & GYNECOLOGY

## 2025-06-02 PROCEDURE — 3080F DIAST BP >= 90 MM HG: CPT | Mod: CPTII,S$GLB,, | Performed by: OBSTETRICS & GYNECOLOGY

## 2025-06-02 PROCEDURE — 3008F BODY MASS INDEX DOCD: CPT | Mod: CPTII,S$GLB,, | Performed by: OBSTETRICS & GYNECOLOGY

## 2025-06-02 PROCEDURE — 1159F MED LIST DOCD IN RCRD: CPT | Mod: CPTII,S$GLB,, | Performed by: OBSTETRICS & GYNECOLOGY

## 2025-06-02 PROCEDURE — 3077F SYST BP >= 140 MM HG: CPT | Mod: CPTII,S$GLB,, | Performed by: OBSTETRICS & GYNECOLOGY

## 2025-06-02 PROCEDURE — 76816 OB US FOLLOW-UP PER FETUS: CPT | Mod: S$GLB,,, | Performed by: OBSTETRICS & GYNECOLOGY

## 2025-06-02 PROCEDURE — 76819 FETAL BIOPHYS PROFIL W/O NST: CPT | Mod: S$GLB,,, | Performed by: OBSTETRICS & GYNECOLOGY

## 2025-06-05 ENCOUNTER — HOSPITAL ENCOUNTER (OUTPATIENT)
Facility: HOSPITAL | Age: 35
Discharge: HOME | End: 2025-06-05
Attending: OBSTETRICS & GYNECOLOGY | Admitting: OBSTETRICS & GYNECOLOGY
Payer: MEDICAID

## 2025-06-05 ENCOUNTER — OFFICE VISIT (OUTPATIENT)
Dept: FAMILY MEDICINE | Facility: CLINIC | Age: 35
End: 2025-06-05
Payer: MEDICAID

## 2025-06-05 VITALS
SYSTOLIC BLOOD PRESSURE: 136 MMHG | OXYGEN SATURATION: 99 % | HEART RATE: 77 BPM | TEMPERATURE: 98 F | BODY MASS INDEX: 31.63 KG/M2 | DIASTOLIC BLOOD PRESSURE: 84 MMHG | WEIGHT: 196.81 LBS | HEIGHT: 66 IN

## 2025-06-05 VITALS — HEART RATE: 69 BPM | DIASTOLIC BLOOD PRESSURE: 89 MMHG | SYSTOLIC BLOOD PRESSURE: 132 MMHG

## 2025-06-05 DIAGNOSIS — O36.0130 ANTI-D ANTIBODIES PRESENT DURING PREGNANCY IN THIRD TRIMESTER, SINGLE OR UNSPECIFIED FETUS: ICD-10-CM

## 2025-06-05 DIAGNOSIS — O10.919 CHRONIC HYPERTENSION AFFECTING PREGNANCY: ICD-10-CM

## 2025-06-05 DIAGNOSIS — O28.8 NST (NON-STRESS TEST) NONREACTIVE: ICD-10-CM

## 2025-06-05 DIAGNOSIS — Z3A.32 32 WEEKS GESTATION OF PREGNANCY: Primary | ICD-10-CM

## 2025-06-05 LAB
BILIRUB SERPL-MCNC: NORMAL MG/DL
BLOOD URINE, POC: NORMAL
CLARITY, POC UA: CLEAR
COLOR, POC UA: YELLOW
GLUCOSE UR QL STRIP: NORMAL
KETONES UR QL STRIP: NORMAL
LEUKOCYTE ESTERASE URINE, POC: NORMAL
NITRITE, POC UA: NORMAL
PH, POC UA: 6
PROTEIN, POC: NORMAL
SPECIFIC GRAVITY, POC UA: 1.01
UROBILINOGEN, POC UA: 0.2

## 2025-06-05 PROCEDURE — 81002 URINALYSIS NONAUTO W/O SCOPE: CPT | Mod: PBBFAC

## 2025-06-05 PROCEDURE — 99213 OFFICE O/P EST LOW 20 MIN: CPT | Mod: PBBFAC,25

## 2025-06-05 PROCEDURE — 59025 FETAL NON-STRESS TEST: CPT

## 2025-06-09 ENCOUNTER — PROCEDURE VISIT (OUTPATIENT)
Dept: MATERNAL FETAL MEDICINE | Facility: CLINIC | Age: 35
End: 2025-06-09
Payer: MEDICAID

## 2025-06-09 VITALS — DIASTOLIC BLOOD PRESSURE: 77 MMHG | SYSTOLIC BLOOD PRESSURE: 108 MMHG | HEART RATE: 93 BPM

## 2025-06-09 DIAGNOSIS — O09.523 AMA (ADVANCED MATERNAL AGE) MULTIGRAVIDA 35+, THIRD TRIMESTER: ICD-10-CM

## 2025-06-09 DIAGNOSIS — O10.919 CHRONIC HYPERTENSION AFFECTING PREGNANCY: ICD-10-CM

## 2025-06-09 DIAGNOSIS — Q75.8 CONGENITAL HYPOPLASIA OF NASAL BONE: ICD-10-CM

## 2025-06-09 DIAGNOSIS — O36.0130 ANTI-D ANTIBODIES PRESENT DURING PREGNANCY IN THIRD TRIMESTER, SINGLE OR UNSPECIFIED FETUS: ICD-10-CM

## 2025-06-16 ENCOUNTER — PROCEDURE VISIT (OUTPATIENT)
Dept: MATERNAL FETAL MEDICINE | Facility: CLINIC | Age: 35
End: 2025-06-16
Payer: MEDICAID

## 2025-06-16 ENCOUNTER — HOSPITAL ENCOUNTER (INPATIENT)
Facility: HOSPITAL | Age: 35
LOS: 4 days | Discharge: HOME | End: 2025-06-20
Attending: OBSTETRICS & GYNECOLOGY | Admitting: OBSTETRICS & GYNECOLOGY
Payer: MEDICAID

## 2025-06-16 VITALS — SYSTOLIC BLOOD PRESSURE: 120 MMHG | HEART RATE: 85 BPM | DIASTOLIC BLOOD PRESSURE: 85 MMHG

## 2025-06-16 DIAGNOSIS — O26.899 RH NEGATIVE, DELIVERED, CURRENT HOSPITALIZATION: Primary | ICD-10-CM

## 2025-06-16 DIAGNOSIS — Z34.90 ENCOUNTER FOR INDUCTION OF LABOR: ICD-10-CM

## 2025-06-16 DIAGNOSIS — Z67.91 RH NEGATIVE, DELIVERED, CURRENT HOSPITALIZATION: Primary | ICD-10-CM

## 2025-06-16 DIAGNOSIS — R07.9 CHEST PAIN: ICD-10-CM

## 2025-06-16 DIAGNOSIS — O09.523 AMA (ADVANCED MATERNAL AGE) MULTIGRAVIDA 35+, THIRD TRIMESTER: ICD-10-CM

## 2025-06-16 DIAGNOSIS — O36.5990 IUGR (INTRAUTERINE GROWTH RESTRICTION) AFFECTING CARE OF MOTHER: ICD-10-CM

## 2025-06-16 DIAGNOSIS — O36.0130 ANTI-D ANTIBODIES PRESENT DURING PREGNANCY IN THIRD TRIMESTER, SINGLE OR UNSPECIFIED FETUS: ICD-10-CM

## 2025-06-16 DIAGNOSIS — Q75.8 CONGENITAL HYPOPLASIA OF NASAL BONE: ICD-10-CM

## 2025-06-16 DIAGNOSIS — O10.919 CHRONIC HYPERTENSION AFFECTING PREGNANCY: ICD-10-CM

## 2025-06-16 PROBLEM — O28.8 NST (NON-STRESS TEST) NONREACTIVE: Status: ACTIVE | Noted: 2025-06-16

## 2025-06-16 LAB
ALBUMIN SERPL-MCNC: 3.1 G/DL (ref 3.5–5)
ALBUMIN/GLOB SERPL: 0.9 RATIO (ref 1.1–2)
ALP SERPL-CCNC: 141 UNIT/L (ref 40–150)
ALT SERPL-CCNC: 17 UNIT/L (ref 0–55)
ANION GAP SERPL CALC-SCNC: 10 MEQ/L
ANTIBODY IDENTIFICATION: NORMAL
AST SERPL-CCNC: 19 UNIT/L (ref 11–45)
BASOPHILS # BLD AUTO: 0.01 X10(3)/MCL
BASOPHILS NFR BLD AUTO: 0.1 %
BILIRUB SERPL-MCNC: 0.2 MG/DL
BUN SERPL-MCNC: 12.6 MG/DL (ref 7–18.7)
CALCIUM SERPL-MCNC: 8.7 MG/DL (ref 8.4–10.2)
CHLORIDE SERPL-SCNC: 107 MMOL/L (ref 98–107)
CO2 SERPL-SCNC: 21 MMOL/L (ref 22–29)
CREAT SERPL-MCNC: 0.78 MG/DL (ref 0.55–1.02)
CREAT UR-MCNC: 24.5 MG/DL (ref 45–106)
CREAT/UREA NIT SERPL: 16
CTP QC/QA: YES
EOSINOPHIL # BLD AUTO: 0.01 X10(3)/MCL (ref 0–0.9)
EOSINOPHIL NFR BLD AUTO: 0.1 %
ERYTHROCYTE [DISTWIDTH] IN BLOOD BY AUTOMATED COUNT: 12.5 % (ref 11.5–17)
GFR SERPLBLD CREATININE-BSD FMLA CKD-EPI: >60 ML/MIN/1.73/M2
GLOBULIN SER-MCNC: 3.6 GM/DL (ref 2.4–3.5)
GLUCOSE SERPL-MCNC: 91 MG/DL (ref 74–100)
GROUP & RH: ABNORMAL
HCT VFR BLD AUTO: 37.5 % (ref 37–47)
HGB BLD-MCNC: 12.1 G/DL (ref 12–16)
IMM GRANULOCYTES # BLD AUTO: 0.02 X10(3)/MCL (ref 0–0.04)
IMM GRANULOCYTES NFR BLD AUTO: 0.2 %
INDIRECT COOMBS: ABNORMAL
LYMPHOCYTES # BLD AUTO: 2.14 X10(3)/MCL (ref 0.6–4.6)
LYMPHOCYTES NFR BLD AUTO: 26.1 %
MCH RBC QN AUTO: 29.7 PG (ref 27–31)
MCHC RBC AUTO-ENTMCNC: 32.3 G/DL (ref 33–36)
MCV RBC AUTO: 91.9 FL (ref 80–94)
MONOCYTES # BLD AUTO: 0.46 X10(3)/MCL (ref 0.1–1.3)
MONOCYTES NFR BLD AUTO: 5.6 %
NEUTROPHILS # BLD AUTO: 5.56 X10(3)/MCL (ref 2.1–9.2)
NEUTROPHILS NFR BLD AUTO: 67.9 %
NRBC BLD AUTO-RTO: 0 %
PLATELET # BLD AUTO: 137 X10(3)/MCL (ref 130–400)
PLATELETS.RETICULATED NFR BLD AUTO: 10.3 % (ref 0.9–11.2)
PMV BLD AUTO: 11.8 FL (ref 7.4–10.4)
POTASSIUM SERPL-SCNC: 4.3 MMOL/L (ref 3.5–5.1)
PROT SERPL-MCNC: 6.7 GM/DL (ref 6.4–8.3)
PROT UR STRIP-MCNC: 24.1 MG/DL
RBC # BLD AUTO: 4.08 X10(6)/MCL (ref 4.2–5.4)
RPR SER QL: NORMAL
RUPTURE OF MEMBRANE: NEGATIVE
SODIUM SERPL-SCNC: 138 MMOL/L (ref 136–145)
SPECIMEN OUTDATE: ABNORMAL
T PALLIDUM AB SER QL: REACTIVE
URATE SERPL-MCNC: 5.9 MG/DL (ref 2.6–6)
URINE PROTEIN/CREATININE RATIO (OLG): 1
WBC # BLD AUTO: 8.2 X10(3)/MCL (ref 4.5–11.5)

## 2025-06-16 PROCEDURE — 63600175 PHARM REV CODE 636 W HCPCS: Performed by: NURSE PRACTITIONER

## 2025-06-16 PROCEDURE — 25000003 PHARM REV CODE 250: Performed by: NURSE PRACTITIONER

## 2025-06-16 PROCEDURE — 86901 BLOOD TYPING SEROLOGIC RH(D): CPT | Performed by: NURSE PRACTITIONER

## 2025-06-16 PROCEDURE — 84550 ASSAY OF BLOOD/URIC ACID: CPT | Performed by: OBSTETRICS & GYNECOLOGY

## 2025-06-16 PROCEDURE — 51702 INSERT TEMP BLADDER CATH: CPT

## 2025-06-16 PROCEDURE — G0378 HOSPITAL OBSERVATION PER HR: HCPCS

## 2025-06-16 PROCEDURE — 86780 TREPONEMA PALLIDUM: CPT | Mod: 59 | Performed by: NURSE PRACTITIONER

## 2025-06-16 PROCEDURE — 86780 TREPONEMA PALLIDUM: CPT | Performed by: NURSE PRACTITIONER

## 2025-06-16 PROCEDURE — 25000003 PHARM REV CODE 250

## 2025-06-16 PROCEDURE — 76820 UMBILICAL ARTERY ECHO: CPT | Mod: S$GLB,,, | Performed by: OBSTETRICS & GYNECOLOGY

## 2025-06-16 PROCEDURE — 11000001 HC ACUTE MED/SURG PRIVATE ROOM

## 2025-06-16 PROCEDURE — 86870 RBC ANTIBODY IDENTIFICATION: CPT | Performed by: NURSE PRACTITIONER

## 2025-06-16 PROCEDURE — 76821 MIDDLE CEREBRAL ARTERY ECHO: CPT | Mod: S$GLB,,, | Performed by: OBSTETRICS & GYNECOLOGY

## 2025-06-16 PROCEDURE — 85025 COMPLETE CBC W/AUTO DIFF WBC: CPT | Performed by: NURSE PRACTITIONER

## 2025-06-16 PROCEDURE — G0379 DIRECT REFER HOSPITAL OBSERV: HCPCS

## 2025-06-16 PROCEDURE — 84156 ASSAY OF PROTEIN URINE: CPT | Performed by: OBSTETRICS & GYNECOLOGY

## 2025-06-16 PROCEDURE — 63600175 PHARM REV CODE 636 W HCPCS: Performed by: OBSTETRICS & GYNECOLOGY

## 2025-06-16 PROCEDURE — 96372 THER/PROPH/DIAG INJ SC/IM: CPT | Performed by: NURSE PRACTITIONER

## 2025-06-16 PROCEDURE — 86592 SYPHILIS TEST NON-TREP QUAL: CPT | Performed by: NURSE PRACTITIONER

## 2025-06-16 PROCEDURE — 80053 COMPREHEN METABOLIC PANEL: CPT | Performed by: OBSTETRICS & GYNECOLOGY

## 2025-06-16 PROCEDURE — 76818 FETAL BIOPHYS PROFILE W/NST: CPT | Mod: S$GLB,,, | Performed by: OBSTETRICS & GYNECOLOGY

## 2025-06-16 RX ORDER — IBUPROFEN 200 MG
16 TABLET ORAL
Status: DISCONTINUED | OUTPATIENT
Start: 2025-06-16 | End: 2025-06-20 | Stop reason: HOSPADM

## 2025-06-16 RX ORDER — LABETALOL 200 MG/1
200 TABLET, FILM COATED ORAL EVERY 8 HOURS
Status: DISCONTINUED | OUTPATIENT
Start: 2025-06-16 | End: 2025-06-17

## 2025-06-16 RX ORDER — CALCIUM GLUCONATE 98 MG/ML
1 INJECTION, SOLUTION INTRAVENOUS
Status: DISCONTINUED | OUTPATIENT
Start: 2025-06-16 | End: 2025-06-20 | Stop reason: HOSPADM

## 2025-06-16 RX ORDER — NALOXONE HCL 0.4 MG/ML
0.02 VIAL (ML) INJECTION
Status: DISCONTINUED | OUTPATIENT
Start: 2025-06-16 | End: 2025-06-20 | Stop reason: HOSPADM

## 2025-06-16 RX ORDER — DIPHENHYDRAMINE HCL 25 MG
25 CAPSULE ORAL EVERY 4 HOURS PRN
Status: DISCONTINUED | OUTPATIENT
Start: 2025-06-16 | End: 2025-06-16

## 2025-06-16 RX ORDER — ONDANSETRON 4 MG/1
8 TABLET, ORALLY DISINTEGRATING ORAL EVERY 8 HOURS PRN
Status: DISCONTINUED | OUTPATIENT
Start: 2025-06-16 | End: 2025-06-20 | Stop reason: HOSPADM

## 2025-06-16 RX ORDER — HYDRALAZINE HYDROCHLORIDE 20 MG/ML
10 INJECTION INTRAMUSCULAR; INTRAVENOUS ONCE AS NEEDED
Status: DISCONTINUED | OUTPATIENT
Start: 2025-06-16 | End: 2025-06-20 | Stop reason: HOSPADM

## 2025-06-16 RX ORDER — LABETALOL HCL 20 MG/4 ML
20 SYRINGE (ML) INTRAVENOUS ONCE AS NEEDED
Status: COMPLETED | OUTPATIENT
Start: 2025-06-16 | End: 2025-06-16

## 2025-06-16 RX ORDER — SODIUM CHLORIDE, SODIUM LACTATE, POTASSIUM CHLORIDE, CALCIUM CHLORIDE 600; 310; 30; 20 MG/100ML; MG/100ML; MG/100ML; MG/100ML
INJECTION, SOLUTION INTRAVENOUS CONTINUOUS
Status: DISCONTINUED | OUTPATIENT
Start: 2025-06-16 | End: 2025-06-20 | Stop reason: HOSPADM

## 2025-06-16 RX ORDER — AMOXICILLIN 250 MG
1 CAPSULE ORAL NIGHTLY PRN
Status: DISCONTINUED | OUTPATIENT
Start: 2025-06-16 | End: 2025-06-20 | Stop reason: HOSPADM

## 2025-06-16 RX ORDER — NIFEDIPINE 60 MG/1
60 TABLET, EXTENDED RELEASE ORAL 2 TIMES DAILY
Status: DISCONTINUED | OUTPATIENT
Start: 2025-06-16 | End: 2025-06-20 | Stop reason: HOSPADM

## 2025-06-16 RX ORDER — DIPHENHYDRAMINE HCL 25 MG
25 CAPSULE ORAL EVERY 4 HOURS PRN
Status: DISCONTINUED | OUTPATIENT
Start: 2025-06-16 | End: 2025-06-18

## 2025-06-16 RX ORDER — GLUCAGON 1 MG
1 KIT INJECTION
Status: DISCONTINUED | OUTPATIENT
Start: 2025-06-16 | End: 2025-06-20 | Stop reason: HOSPADM

## 2025-06-16 RX ORDER — MUPIROCIN 20 MG/G
OINTMENT TOPICAL 2 TIMES DAILY
OUTPATIENT
Start: 2025-06-16 | End: 2025-06-21

## 2025-06-16 RX ORDER — DIPHENHYDRAMINE HYDROCHLORIDE 50 MG/ML
25 INJECTION, SOLUTION INTRAMUSCULAR; INTRAVENOUS EVERY 4 HOURS PRN
Status: DISCONTINUED | OUTPATIENT
Start: 2025-06-16 | End: 2025-06-18

## 2025-06-16 RX ORDER — LABETALOL HCL 20 MG/4 ML
80 SYRINGE (ML) INTRAVENOUS ONCE AS NEEDED
Status: DISCONTINUED | OUTPATIENT
Start: 2025-06-16 | End: 2025-06-20 | Stop reason: HOSPADM

## 2025-06-16 RX ORDER — LABETALOL 200 MG/1
200 TABLET, FILM COATED ORAL EVERY 8 HOURS
Status: DISCONTINUED | OUTPATIENT
Start: 2025-06-16 | End: 2025-06-16

## 2025-06-16 RX ORDER — IBUPROFEN 200 MG
24 TABLET ORAL
Status: DISCONTINUED | OUTPATIENT
Start: 2025-06-16 | End: 2025-06-20 | Stop reason: HOSPADM

## 2025-06-16 RX ORDER — SODIUM CHLORIDE 0.9 % (FLUSH) 0.9 %
10 SYRINGE (ML) INJECTION EVERY 12 HOURS PRN
Status: DISCONTINUED | OUTPATIENT
Start: 2025-06-16 | End: 2025-06-20 | Stop reason: HOSPADM

## 2025-06-16 RX ORDER — SIMETHICONE 80 MG
1 TABLET,CHEWABLE ORAL EVERY 6 HOURS PRN
Status: DISCONTINUED | OUTPATIENT
Start: 2025-06-16 | End: 2025-06-20 | Stop reason: HOSPADM

## 2025-06-16 RX ORDER — ACETAMINOPHEN 325 MG/1
650 TABLET ORAL EVERY 6 HOURS PRN
Status: DISCONTINUED | OUTPATIENT
Start: 2025-06-16 | End: 2025-06-18

## 2025-06-16 RX ORDER — MAGNESIUM SULFATE HEPTAHYDRATE 40 MG/ML
2 INJECTION, SOLUTION INTRAVENOUS CONTINUOUS
Status: DISCONTINUED | OUTPATIENT
Start: 2025-06-16 | End: 2025-06-20 | Stop reason: HOSPADM

## 2025-06-16 RX ORDER — SODIUM CHLORIDE 0.9 % (FLUSH) 0.9 %
10 SYRINGE (ML) INJECTION
Status: DISCONTINUED | OUTPATIENT
Start: 2025-06-16 | End: 2025-06-18

## 2025-06-16 RX ORDER — MAGNESIUM SULFATE HEPTAHYDRATE 40 MG/ML
4 INJECTION, SOLUTION INTRAVENOUS ONCE
Status: COMPLETED | OUTPATIENT
Start: 2025-06-16 | End: 2025-06-16

## 2025-06-16 RX ORDER — NIFEDIPINE 60 MG/1
60 TABLET, EXTENDED RELEASE ORAL 2 TIMES DAILY
Status: DISCONTINUED | OUTPATIENT
Start: 2025-06-16 | End: 2025-06-16

## 2025-06-16 RX ORDER — PRENATAL WITH FERROUS FUM AND FOLIC ACID 3080; 920; 120; 400; 22; 1.84; 3; 20; 10; 1; 12; 200; 27; 25; 2 [IU]/1; [IU]/1; MG/1; [IU]/1; MG/1; MG/1; MG/1; MG/1; MG/1; MG/1; UG/1; MG/1; MG/1; MG/1; MG/1
1 TABLET ORAL DAILY
Status: DISCONTINUED | OUTPATIENT
Start: 2025-06-17 | End: 2025-06-20 | Stop reason: HOSPADM

## 2025-06-16 RX ORDER — LABETALOL HCL 20 MG/4 ML
40 SYRINGE (ML) INTRAVENOUS ONCE AS NEEDED
Status: DISCONTINUED | OUTPATIENT
Start: 2025-06-16 | End: 2025-06-20 | Stop reason: HOSPADM

## 2025-06-16 RX ORDER — BETAMETHASONE SODIUM PHOSPHATE AND BETAMETHASONE ACETATE 3; 3 MG/ML; MG/ML
12 INJECTION, SUSPENSION INTRA-ARTICULAR; INTRALESIONAL; INTRAMUSCULAR; SOFT TISSUE
Status: COMPLETED | OUTPATIENT
Start: 2025-06-16 | End: 2025-06-17

## 2025-06-16 RX ADMIN — SODIUM CHLORIDE, POTASSIUM CHLORIDE, SODIUM LACTATE AND CALCIUM CHLORIDE 500 ML: 600; 310; 30; 20 INJECTION, SOLUTION INTRAVENOUS at 12:06

## 2025-06-16 RX ADMIN — MAGNESIUM SULFATE HEPTAHYDRATE 4 G: 40 INJECTION, SOLUTION INTRAVENOUS at 06:06

## 2025-06-16 RX ADMIN — LABETALOL HYDROCHLORIDE 20 MG: 5 INJECTION, SOLUTION INTRAVENOUS at 05:06

## 2025-06-16 RX ADMIN — ACETAMINOPHEN 650 MG: 325 TABLET ORAL at 06:06

## 2025-06-16 RX ADMIN — NIFEDIPINE 60 MG: 60 TABLET, FILM COATED, EXTENDED RELEASE ORAL at 09:06

## 2025-06-16 RX ADMIN — BETAMETHASONE SODIUM PHOSPHATE AND BETAMETHASONE ACETATE 12 MG: 3; 3 INJECTION, SUSPENSION INTRA-ARTICULAR; INTRALESIONAL; INTRAMUSCULAR at 01:06

## 2025-06-16 RX ADMIN — LABETALOL HYDROCHLORIDE 200 MG: 200 TABLET, FILM COATED ORAL at 02:06

## 2025-06-16 RX ADMIN — SODIUM CHLORIDE, POTASSIUM CHLORIDE, SODIUM LACTATE AND CALCIUM CHLORIDE: 600; 310; 30; 20 INJECTION, SOLUTION INTRAVENOUS at 06:06

## 2025-06-16 RX ADMIN — LABETALOL HYDROCHLORIDE 200 MG: 200 TABLET, FILM COATED ORAL at 10:06

## 2025-06-16 NOTE — H&P
HISTORY AND PHYSICAL                                                OBSTETRICS          Subjective:      Savanah Pang is a 35 y.o.  female with IUP at 33w5d weeks gestation who presents to L&D for Non reactive NST at [facility] appointment on  along with decreased fetal movements. Pertinent medical history for this pregnancy includes chronic HTN, hx fo maternal syphilis, Anti-D antibodies positive, FGR(EFW 17% AC 9% on ).  Patient reports no acute complaints. Reports decreased fetal movements since this morning. Patient tolerated oral diet this AM with no change in lack of fetal movement. Was told to come to ED after non-reactive NST today at [facility] office. Of note, had non-reactive NST on ,  OB US obtained with BPP . Patient does report increased fetal movement since arriving to ED. Reports controlled BP at home with oral hypertensive regiment, took her morning medications this AM, normal blood pressure at [facility] office. On arrival, hypertensive initially at 161/94. Denies acute nausea, vomiting, chest pain, SOB, abdominal pain, dysuria. She denies contractions, vaginal bleeding, leakage of fluid, and decreased fetal movement.  Care this pregnancy has been with [facility] and [facility](Dr. José Miguel MD)    PMHx:   Past Medical History:   Diagnosis Date    Hypertension        PSHx: History reviewed. No pertinent surgical history.    All: Review of patient's allergies indicates:  No Known Allergies    Meds: Prescriptions Prior to Admission[1]  Medications Prior to Admission   Medication Sig Dispense Refill Last Dose/Taking    aspirin (ECOTRIN) 81 MG EC tablet Take 1 tablet (81 mg total) by mouth once daily. 30 tablet 2 2025    labetaloL (NORMODYNE) 200 MG tablet Take 1 tablet (200 mg total) by mouth 2 (two) times a day. (Patient taking differently: Take 200 mg by mouth 3 (three) times daily.) 60 tablet 11 2025    lansoprazole (PREVACID) 15 MG capsule Take 1 capsule (15 mg total) by mouth once daily. 30  "capsule 11 Past Week    PNV,calcium 72-iron-folic acid (PRENATAL VITAMIN PLUS LOW IRON) 27 mg iron- 1 mg Tab Take 1 tablet (1 each total) by mouth once daily. 30 tablet 11 6/15/2025    NIFEdipine (PROCARDIA-XL) 60 MG (OSM) 24 hr tablet Take 1 tablet (60 mg total) by mouth 2 (two) times a day.        SH: Social History[2]    FH:   Family History   Problem Relation Name Age of Onset    No Known Problems Father      Hypertension Mother         OBHx:   OB History    Para Term  AB Living   6 5 5 0 0 5   SAB IAB Ectopic Multiple Live Births   0 0 0 0 5      # Outcome Date GA Lbr Corby/2nd Weight Sex Type Anes PTL Lv   6 Current            5 Term 01/10/24 37w0d  2.863 kg (6 lb 5 oz) M Vag-Spont   ERIKA   4 Term 18 38w0d  2.665 kg (5 lb 14 oz) F Vag-Spont   ERIKA   3 Term 14 38w0d  2.92 kg (6 lb 7 oz) F Vag-Spont   ERIKA   2 Term 10/20/10 38w0d  3.147 kg (6 lb 15 oz) M Vag-Spont   ERIKA   1 Term 10/28/09 40w0d  2.863 kg (6 lb 5 oz) F Vag-Spont  N ERIKA       Objective:      BP (!) 155/103   Pulse 67   Temp 98.3 °F (36.8 °C)   Resp 16   Ht 5' 6" (1.676 m)   Wt 89.4 kg (197 lb)   LMP 10/23/2024 (Exact Date)   SpO2 99%   Breastfeeding No   BMI 31.80 kg/m²   Body mass index is 31.8 kg/m².    General:   alert and cooperative   HEENT:  normocephalic, atraumatic   Lungs:   clear to auscultation bilaterally   Heart:   regular rate and rhythm, S1, S2 normal, no murmur   Abdomen:  gravid, non-tender, fundus palpable above umbilicus    Extremities non-tender, no edema   Derm: no rashes or lesions   Psych: appropriate mood and affect   Pelvis:  adequate       EFM: Cat 1, modBTV, +accel, no decel (reassuring, reactive)  TOCO: no contraction          Lab Review  Blood Type O NEG  GBBS: unknown 2025  Rubella: immune 2025  RPR:  NR 2025  HIV: NR 2025  HepB:   Hep BsAg Interp   Date Value Ref Range Status   2025 Nonreactive Nonreactive Final        Lab Results   Component Value Date    WBC " 8.20 2025    HGB 12.1 2025    HCT 37.5 2025    MCV 91.9 2025     2025           Assessment:     35 y.o.  at 33w5d weeks gestation with CHTN, FGR, anti-d antibody positive, maternal hx of syphilis infection presented to [facility] for non-reactive NST and decreased fetal movements during [facility] appointment on , admission for observation with continuous fetal monitoring, fetal steroids, and BP control.     Active Hospital Problems    Diagnosis  POA    *NST (non-stress test) nonreactive [O28.8]  Yes     Reports decreased fetal movement on morning of admission, reports increased fetal movements since admission  Reports Non-reactive NST at [facility] office, admitted for Observation with continuous fetal monitoring, FHT on admission with Cat 1 moderate variability  Ordered Betamethasone 12 mg x2 in case of imminent delivery  MFM consulted, appreciate recommendations      - Fetal growth restriction antepartum [O36.5990]  Yes    - Chronic hypertension affecting pregnancy [O10.919]  Yes     Hypertensive on admission with /94, patient reports adherrence to home antihypertensives  Improvement of BP to 140s/100s, continue home BP regiment of Labetalol 200mg TID and Procardia 60mg BID  Denies severe features including headache, vision changes, decreased urination, SOB, or extremity edema         Resolved Hospital Problems   No resolved problems to display.          Plan:     1. Risks, benefits, alternatives and possible complications have been discussed in detail with the patient. All questions have been answered, and Ms. Pang has voiced understanding and agrees to the treatment plan.  2. MFM consulted, appreciate recommendations  3. Hypertensive on admission, goal maintain BP <140/90, continue home medication of procardia 60mg BID and Labetalol 200mg TID   4. Continuous fetal monitoring, s/p LR 500cc bolus, will give Betamethasone 12mg x2 in case of imminent delivery  5. per MFM, given  Hx of chronic HTN will continue to monitor BP with oral hypertensive regiment as above    Amie Carlos MD  OB/GYN Hospitalist  1:54 PM 06/16/2025           [1]   Medications Prior to Admission   Medication Sig Dispense Refill Last Dose/Taking    aspirin (ECOTRIN) 81 MG EC tablet Take 1 tablet (81 mg total) by mouth once daily. 30 tablet 2 6/16/2025    labetaloL (NORMODYNE) 200 MG tablet Take 1 tablet (200 mg total) by mouth 2 (two) times a day. (Patient taking differently: Take 200 mg by mouth 3 (three) times daily.) 60 tablet 11 6/16/2025    lansoprazole (PREVACID) 15 MG capsule Take 1 capsule (15 mg total) by mouth once daily. 30 capsule 11 Past Week    PNV,calcium 72-iron-folic acid (PRENATAL VITAMIN PLUS LOW IRON) 27 mg iron- 1 mg Tab Take 1 tablet (1 each total) by mouth once daily. 30 tablet 11 6/15/2025    NIFEdipine (PROCARDIA-XL) 60 MG (OSM) 24 hr tablet Take 1 tablet (60 mg total) by mouth 2 (two) times a day. 60 tablet 6    [2]   Social History  Socioeconomic History    Marital status: Single    Number of children: 4   Tobacco Use    Smoking status: Former     Types: Cigarettes     Passive exposure: Never    Smokeless tobacco: Never   Vaping Use    Vaping status: Never Used   Substance and Sexual Activity    Alcohol use: Not Currently    Drug use: Never    Sexual activity: Yes     Partners: Male     Social Drivers of Health     Financial Resource Strain: Low Risk  (6/16/2025)    Overall Financial Resource Strain (CARDIA)     Difficulty of Paying Living Expenses: Not hard at all   Food Insecurity: No Food Insecurity (6/16/2025)    Hunger Vital Sign     Worried About Running Out of Food in the Last Year: Never true     Ran Out of Food in the Last Year: Never true   Transportation Needs: No Transportation Needs (6/16/2025)    PRAPARE - Transportation     Lack of Transportation (Medical): No     Lack of Transportation (Non-Medical): No   Physical Activity: Inactive (5/21/2025)    Exercise Vital Sign     Days  of Exercise per Week: 0 days     Minutes of Exercise per Session: 0 min   Stress: No Stress Concern Present (6/16/2025)    [employer] - Occupational Stress Questionnaire     Feeling of Stress : Not at all   Housing Stability: Low Risk  (6/16/2025)    Housing Stability Vital Sign     Unable to Pay for Housing in the Last Year: No     Number of Times Moved in the Last Year: 0     Homeless in the Last Year: No   Recent Concern: Housing Stability - High Risk (5/21/2025)    Housing Stability Vital Sign     Unable to Pay for Housing in the Last Year: Yes     Number of Times Moved in the Last Year: 0     Homeless in the Last Year: No

## 2025-06-17 LAB — MAGNESIUM SERPL-MCNC: 5.6 MG/DL (ref 1.6–2.6)

## 2025-06-17 PROCEDURE — 25000003 PHARM REV CODE 250: Performed by: OBSTETRICS & GYNECOLOGY

## 2025-06-17 PROCEDURE — 63600175 PHARM REV CODE 636 W HCPCS: Performed by: OBSTETRICS & GYNECOLOGY

## 2025-06-17 PROCEDURE — 25000003 PHARM REV CODE 250

## 2025-06-17 PROCEDURE — 4A1HXCZ MONITORING OF PRODUCTS OF CONCEPTION, CARDIAC RATE, EXTERNAL APPROACH: ICD-10-PCS | Performed by: OBSTETRICS & GYNECOLOGY

## 2025-06-17 PROCEDURE — 63600175 PHARM REV CODE 636 W HCPCS: Performed by: NURSE PRACTITIONER

## 2025-06-17 PROCEDURE — 99233 SBSQ HOSP IP/OBS HIGH 50: CPT | Mod: ,,, | Performed by: OBSTETRICS & GYNECOLOGY

## 2025-06-17 PROCEDURE — 83735 ASSAY OF MAGNESIUM: CPT | Performed by: OBSTETRICS & GYNECOLOGY

## 2025-06-17 PROCEDURE — 25000003 PHARM REV CODE 250: Performed by: NURSE PRACTITIONER

## 2025-06-17 PROCEDURE — 63600175 PHARM REV CODE 636 W HCPCS

## 2025-06-17 PROCEDURE — 11000001 HC ACUTE MED/SURG PRIVATE ROOM

## 2025-06-17 PROCEDURE — 96372 THER/PROPH/DIAG INJ SC/IM: CPT | Performed by: NURSE PRACTITIONER

## 2025-06-17 PROCEDURE — 87653 STREP B DNA AMP PROBE: CPT | Performed by: SPECIALIST

## 2025-06-17 RX ORDER — PENICILLIN G 3000000 [IU]/50ML
3 INJECTION, SOLUTION INTRAVENOUS
Status: DISCONTINUED | OUTPATIENT
Start: 2025-06-17 | End: 2025-06-18

## 2025-06-17 RX ORDER — OXYTOCIN-SODIUM CHLORIDE 0.9% IV SOLN 30 UNIT/500ML 30-0.9/5 UT/ML-%
95 SOLUTION INTRAVENOUS CONTINUOUS PRN
Status: DISCONTINUED | OUTPATIENT
Start: 2025-06-17 | End: 2025-06-18

## 2025-06-17 RX ORDER — CARBOPROST TROMETHAMINE 250 UG/ML
250 INJECTION, SOLUTION INTRAMUSCULAR
Status: DISCONTINUED | OUTPATIENT
Start: 2025-06-17 | End: 2025-06-18

## 2025-06-17 RX ORDER — SODIUM CHLORIDE 9 MG/ML
INJECTION, SOLUTION INTRAVENOUS
Status: DISCONTINUED | OUTPATIENT
Start: 2025-06-17 | End: 2025-06-18

## 2025-06-17 RX ORDER — BUTALBITAL, ACETAMINOPHEN AND CAFFEINE 50; 325; 40 MG/1; MG/1; MG/1
1 TABLET ORAL EVERY 4 HOURS PRN
Status: DISCONTINUED | OUTPATIENT
Start: 2025-06-17 | End: 2025-06-20 | Stop reason: HOSPADM

## 2025-06-17 RX ORDER — MISOPROSTOL 100 UG/1
800 TABLET ORAL ONCE AS NEEDED
Status: DISCONTINUED | OUTPATIENT
Start: 2025-06-17 | End: 2025-06-20 | Stop reason: HOSPADM

## 2025-06-17 RX ORDER — OXYTOCIN-SODIUM CHLORIDE 0.9% IV SOLN 30 UNIT/500ML 30-0.9/5 UT/ML-%
10 SOLUTION INTRAVENOUS ONCE AS NEEDED
Status: COMPLETED | OUTPATIENT
Start: 2025-06-17 | End: 2025-06-18

## 2025-06-17 RX ORDER — OXYTOCIN-SODIUM CHLORIDE 0.9% IV SOLN 30 UNIT/500ML 30-0.9/5 UT/ML-%
10 SOLUTION INTRAVENOUS ONCE AS NEEDED
Status: DISCONTINUED | OUTPATIENT
Start: 2025-06-17 | End: 2025-06-20 | Stop reason: HOSPADM

## 2025-06-17 RX ORDER — OXYTOCIN-SODIUM CHLORIDE 0.9% IV SOLN 30 UNIT/500ML 30-0.9/5 UT/ML-%
95 SOLUTION INTRAVENOUS ONCE AS NEEDED
Status: DISCONTINUED | OUTPATIENT
Start: 2025-06-17 | End: 2025-06-18

## 2025-06-17 RX ORDER — LABETALOL 200 MG/1
200 TABLET, FILM COATED ORAL
Status: DISCONTINUED | OUTPATIENT
Start: 2025-06-17 | End: 2025-06-17

## 2025-06-17 RX ORDER — METHYLERGONOVINE MALEATE 0.2 MG/ML
200 INJECTION INTRAVENOUS ONCE AS NEEDED
Status: DISCONTINUED | OUTPATIENT
Start: 2025-06-17 | End: 2025-06-18

## 2025-06-17 RX ORDER — MORPHINE SULFATE 4 MG/ML
4 INJECTION, SOLUTION INTRAMUSCULAR; INTRAVENOUS ONCE
Status: DISCONTINUED | OUTPATIENT
Start: 2025-06-17 | End: 2025-06-20 | Stop reason: HOSPADM

## 2025-06-17 RX ORDER — DIPHENOXYLATE HYDROCHLORIDE AND ATROPINE SULFATE 2.5; .025 MG/1; MG/1
2 TABLET ORAL EVERY 6 HOURS PRN
Status: DISCONTINUED | OUTPATIENT
Start: 2025-06-17 | End: 2025-06-18

## 2025-06-17 RX ORDER — SODIUM CHLORIDE, SODIUM LACTATE, POTASSIUM CHLORIDE, CALCIUM CHLORIDE 600; 310; 30; 20 MG/100ML; MG/100ML; MG/100ML; MG/100ML
INJECTION, SOLUTION INTRAVENOUS CONTINUOUS
Status: DISCONTINUED | OUTPATIENT
Start: 2025-06-17 | End: 2025-06-18

## 2025-06-17 RX ORDER — OXYTOCIN-SODIUM CHLORIDE 0.9% IV SOLN 30 UNIT/500ML 30-0.9/5 UT/ML-%
95 SOLUTION INTRAVENOUS CONTINUOUS PRN
Status: DISCONTINUED | OUTPATIENT
Start: 2025-06-17 | End: 2025-06-20 | Stop reason: HOSPADM

## 2025-06-17 RX ORDER — LABETALOL 200 MG/1
200 TABLET, FILM COATED ORAL
Status: DISCONTINUED | OUTPATIENT
Start: 2025-06-17 | End: 2025-06-18

## 2025-06-17 RX ORDER — MUPIROCIN 20 MG/G
OINTMENT TOPICAL
Status: DISCONTINUED | OUTPATIENT
Start: 2025-06-17 | End: 2025-06-18

## 2025-06-17 RX ORDER — CALCIUM CARBONATE 200(500)MG
500 TABLET,CHEWABLE ORAL 3 TIMES DAILY PRN
Status: DISCONTINUED | OUTPATIENT
Start: 2025-06-17 | End: 2025-06-20 | Stop reason: HOSPADM

## 2025-06-17 RX ORDER — OXYTOCIN 10 [USP'U]/ML
10 INJECTION, SOLUTION INTRAMUSCULAR; INTRAVENOUS ONCE AS NEEDED
Status: DISCONTINUED | OUTPATIENT
Start: 2025-06-17 | End: 2025-06-18

## 2025-06-17 RX ORDER — LIDOCAINE HYDROCHLORIDE 10 MG/ML
10 INJECTION, SOLUTION INFILTRATION; PERINEURAL ONCE AS NEEDED
Status: DISCONTINUED | OUTPATIENT
Start: 2025-06-17 | End: 2025-06-20 | Stop reason: HOSPADM

## 2025-06-17 RX ADMIN — ACETAMINOPHEN 650 MG: 325 TABLET ORAL at 11:06

## 2025-06-17 RX ADMIN — BETAMETHASONE SODIUM PHOSPHATE AND BETAMETHASONE ACETATE 12 MG: 3; 3 INJECTION, SUSPENSION INTRA-ARTICULAR; INTRALESIONAL; INTRAMUSCULAR at 05:06

## 2025-06-17 RX ADMIN — SODIUM CHLORIDE, POTASSIUM CHLORIDE, SODIUM LACTATE AND CALCIUM CHLORIDE: 600; 310; 30; 20 INJECTION, SOLUTION INTRAVENOUS at 04:06

## 2025-06-17 RX ADMIN — SODIUM CHLORIDE, POTASSIUM CHLORIDE, SODIUM LACTATE AND CALCIUM CHLORIDE: 600; 310; 30; 20 INJECTION, SOLUTION INTRAVENOUS at 08:06

## 2025-06-17 RX ADMIN — SODIUM CHLORIDE, POTASSIUM CHLORIDE, SODIUM LACTATE AND CALCIUM CHLORIDE 500 ML: 600; 310; 30; 20 INJECTION, SOLUTION INTRAVENOUS at 04:06

## 2025-06-17 RX ADMIN — DINOPROSTONE 10 MG: 10 INSERT VAGINAL at 03:06

## 2025-06-17 RX ADMIN — LABETALOL HYDROCHLORIDE 200 MG: 200 TABLET, FILM COATED ORAL at 12:06

## 2025-06-17 RX ADMIN — PRENATAL VITAMINS-IRON FUMARATE 27 MG IRON-FOLIC ACID 0.8 MG TABLET 1 TABLET: at 09:06

## 2025-06-17 RX ADMIN — CALCIUM CARBONATE (ANTACID) CHEW TAB 500 MG 500 MG: 500 CHEW TAB at 07:06

## 2025-06-17 RX ADMIN — NIFEDIPINE 60 MG: 60 TABLET, FILM COATED, EXTENDED RELEASE ORAL at 09:06

## 2025-06-17 RX ADMIN — NIFEDIPINE 60 MG: 60 TABLET, FILM COATED, EXTENDED RELEASE ORAL at 08:06

## 2025-06-17 RX ADMIN — SODIUM CHLORIDE 5 MILLION UNITS: 900 INJECTION INTRAVENOUS at 03:06

## 2025-06-17 RX ADMIN — ACETAMINOPHEN 650 MG: 325 TABLET ORAL at 01:06

## 2025-06-17 RX ADMIN — CALCIUM CARBONATE (ANTACID) CHEW TAB 500 MG 500 MG: 500 CHEW TAB at 06:06

## 2025-06-17 RX ADMIN — LABETALOL HYDROCHLORIDE 200 MG: 200 TABLET, FILM COATED ORAL at 08:06

## 2025-06-17 NOTE — CONSULTS
Consultation Note  Maternal Fetal Medicine          Subjective:         Savanah Pang is a 35 y.o.  female with IUP at 33w6d with significant history of cHTN, IUGR/ elevated S/D ratio, antiD isoimmunization who is admitted for abnormal  testing and hypertension.     The patient was sent yesterday from clinic due BPP  with nonreactive NST.  When she arrived to the hospital her tracing continued to look nonreassuring and category 2 and she was admitted to the hospital.  Notably her blood pressure was also elevated upon admission to the upper mild range and some severe ranges.    Yesterday evening she was started on magnesium sulfate due to hypertension and given steroids.  She has been on continuous monitoring with some noted late decelerations.     Magnesium stopped overnight. She has persistent HA+.       PMHx:   Past Medical History:   Diagnosis Date    Hypertension        PSHx: History reviewed. No pertinent surgical history.    All: Review of patient's allergies indicates:  No Known Allergies    Meds: Prescriptions Prior to Admission[1]    SH: Social History[2]    FH:   Family History   Problem Relation Name Age of Onset    No Known Problems Father      Hypertension Mother         OBHx:   OB History    Para Term  AB Living   6 5 5 0 0 5   SAB IAB Ectopic Multiple Live Births   0 0 0 0 5      # Outcome Date GA Lbr Corby/2nd Weight Sex Type Anes PTL Lv   6 Current            5 Term 01/10/24 37w0d  2.863 kg (6 lb 5 oz) M Vag-Spont   ERIKA   4 Term 18 38w0d  2.665 kg (5 lb 14 oz) F Vag-Spont   ERIKA   3 Term 14 38w0d  2.92 kg (6 lb 7 oz) F Vag-Spont   ERIKA   2 Term 10/20/10 38w0d  3.147 kg (6 lb 15 oz) M Vag-Spont   ERIKA   1 Term 10/28/09 40w0d  2.863 kg (6 lb 5 oz) F Vag-Spont  N ERIKA       Objective:         Temp:  [97.9 °F (36.6 °C)-98.4 °F (36.9 °C)] 97.9 °F (36.6 °C)  Pulse:  [0-110] 101  Resp:  [15-17] 17  SpO2:  [86 %-100 %] 99 %  BP: ()/() 129/71    Gen: NAD,  A&Ox3  Pulm: Unlabored breathing, LCTAB  Card: RRR  Abd: FHT present, soft, nondistended, nontender to palpation, gravid uterus palpable c/w gestational age  Extremities: Palpable peripheral pulses, 1+ pedal edema,  DTRs x 4    NST: 145 baseline, minimal/moderate BTBV, rare accelerations,  late decelerations present  [place]: Quiet  US: vtx presentation, ant placenta, MVP 3cm    Lab Review  Blood Type: O NEG    Recent Results (from the past 24 hours)   Type & Screen    Collection Time: 06/16/25 12:54 PM   Result Value Ref Range    Group & Rh O NEG     Indirect Mati GEL POS (A)     Specimen Outdate 06/19/2025 23:59    SYPHILIS ANTIBODY (WITH REFLEX RPR)    Collection Time: 06/16/25 12:54 PM   Result Value Ref Range    Syphilis Antibody Reactive (A) Nonreactive, Equivocal   CBC with Differential    Collection Time: 06/16/25 12:54 PM   Result Value Ref Range    WBC 8.20 4.50 - 11.50 x10(3)/mcL    RBC 4.08 (L) 4.20 - 5.40 x10(6)/mcL    Hgb 12.1 12.0 - 16.0 g/dL    Hct 37.5 37.0 - 47.0 %    MCV 91.9 80.0 - 94.0 fL    MCH 29.7 27.0 - 31.0 pg    MCHC 32.3 (L) 33.0 - 36.0 g/dL    RDW 12.5 11.5 - 17.0 %    Platelet 137 130 - 400 x10(3)/mcL    MPV 11.8 (H) 7.4 - 10.4 fL    IPF 10.3 0.9 - 11.2 %    Neut % 67.9 %    Lymph % 26.1 %    Mono % 5.6 %    Eos % 0.1 %    Basophil % 0.1 %    Imm Grans % 0.2 %    Neut # 5.56 2.1 - 9.2 x10(3)/mcL    Lymph # 2.14 0.6 - 4.6 x10(3)/mcL    Mono # 0.46 0.1 - 1.3 x10(3)/mcL    Eos # 0.01 0 - 0.9 x10(3)/mcL    Baso # 0.01 <=0.2 x10(3)/mcL    Imm Gran # 0.02 0.00 - 0.04 x10(3)/mcL    NRBC% 0.0 %   RPR    Collection Time: 06/16/25 12:54 PM   Result Value Ref Range    RPR Non-Reactive Non-Reactive   Antibody identification    Collection Time: 06/16/25 12:54 PM   Result Value Ref Range    Antibody ID POS, Anti-D    Comprehensive Metabolic Panel    Collection Time: 06/16/25  4:40 PM   Result Value Ref Range    Sodium 138 136 - 145 mmol/L    Potassium 4.3 3.5 - 5.1 mmol/L    Chloride 107 98 - 107  mmol/L    CO2 21 (L) 22 - 29 mmol/L    Glucose 91 74 - 100 mg/dL    Blood Urea Nitrogen 12.6 7.0 - 18.7 mg/dL    Creatinine 0.78 0.55 - 1.02 mg/dL    Calcium 8.7 8.4 - 10.2 mg/dL    Protein Total 6.7 6.4 - 8.3 gm/dL    Albumin 3.1 (L) 3.5 - 5.0 g/dL    Globulin 3.6 (H) 2.4 - 3.5 gm/dL    Albumin/Globulin Ratio 0.9 (L) 1.1 - 2.0 ratio    Bilirubin Total 0.2 <=1.5 mg/dL     40 - 150 unit/L    ALT 17 0 - 55 unit/L    AST 19 11 - 45 unit/L    eGFR >60 mL/min/1.73/m2    Anion Gap 10.0 mEq/L    BUN/Creatinine Ratio 16    Uric Acid    Collection Time: 25  4:40 PM   Result Value Ref Range    Uric Acid 5.9 2.6 - 6.0 mg/dL   Protein/Creatinine Ratio, Urine    Collection Time: 25  4:40 PM   Result Value Ref Range    Urine Protein Level 24.1 mg/dL    Urine Creatinine 24.5 (L) 45.0 - 106.0 mg/dL    Urine Protein/Creatinine Ratio 1.0    POCT Rupture of membrane    Collection Time: 25  9:45 PM   Result Value Ref Range    Rupture of Membrane Negative Negative     Acceptable Yes    Magnesium    Collection Time: 25  4:10 AM   Result Value Ref Range    Magnesium Level 5.60 (HH) 1.60 - 2.60 mg/dL       Assessment:       35 y.o.  at 33w6d weeks gestation admitted for abnormal fetal monitoring in the setting of IUGR, elevated S/D ratio, known anti-D isoimmunization and chronic HTN.     Active Hospital Problems    Diagnosis  POA    *NST (non-stress test) nonreactive [O28.8]  Yes     Reports decreased fetal movement on morning of admission, reports increased fetal movements since admission  Reports Non-reactive NST at [facility] office, admitted for Observation with continuous fetal monitoring, FHT on admission with Cat 1 moderate variability  Ordered Betamethasone 12 mg x2 in case of imminent delivery  [facility] consulted, appreciate recommendations      - Fetal growth restriction antepartum [O36.4057]  Yes    - Chronic hypertension affecting pregnancy [O10.919]  Yes     Hypertensive on admission  with /94, patient reports adherrence to home antihypertensives  Improvement of BP to 140s/100s, continue home BP regiment of Labetalol 200mg TID and Procardia 60mg BID  Denies severe features including headache, vision changes, decreased urination, SOB, or extremity edema         Resolved Hospital Problems   No resolved problems to display.        Plan:     1. Non reassuring  testing (POA), IUGR  - in the office her BPP was 6/8, NST was nonreactive.  She was sent in for inpatient monitoring.    Steroids completed.  Second dose was given early by overnight hospitalist.  - status post 12 hours magnesium sulfate  - she is on continuous monitoring.  Overall, the tracing is category 2 with multiple decelerations overnight that resolve spontaneously.   - the pregnancy has been complicated by anti D isoimmunization and IUGR noted recently.  I am concerned that the baby may have some hemolytic disease of the fetus.  MCA Dopplers yesterday were 1.4 MoMs.  - NICU consult   -I recommend proceeding with induction of labor in the setting of persistently nonreassuring fetal testing with known growth restriction, uteroplacental insufficiency and isoimmunization.    2. Superimposed preeclampsia  - the patient has a history of chronic hypertension on multiple medications.  Continue home medications at this time.  I counseled the patient regarding the suspected diagnosis. Other causes of elevated blood pressure and proteinuria include chronic renal disease, autoimmune disorders, chronic hypertension, and diabetes. Additionally, certain urinary tract pathogens can cause proteinuria, however, are unlikely to be associated with hypertension unless prolonged kidney disease is present. I reviewed the diagnosis of preeclampsia, the potential course of this disease, maternal-fetal complications, the lack of any available therapy to prevent progression, the likelihood of resolution post-delivery, and the potential for  recurrence in a subsequent pregnancy. I counseled the patient that one of the major fetal considerations in a pregnancy complicated by preeclampsia are those that are related to prematurity. With the presence of severely elevated blood pressures since admit, HA and proteinuria, she meets diagnostic criteria for si pre-eclampsia.   Recommendations:  She is s/p magnesium sulfate. If uncontrolled severe range Bps, lab abnormalities, or neuro symptoms become apparent, restart magnesium for seizure prophylaxis. Due to her gestational age and added benefit of neuroprotection, recommend 6g load then 2g/hr. Post delivery, recommend 24hrs of Mag.  Steroids complete 6/16-17  Proceed with induction of labor at this time.  See above.  Titrate nifedipine and labetalol as needed to maintain mild range blood pressures are normotensive.  Postpartum goal is less than 140/90.            Thank you for allowing us to participate in the care of your patient. If you have any questions/concerns, please do not hesitate to contact us.     Margarette Valdez MD  Maternal Fetal Medicine               [1]   Medications Prior to Admission   Medication Sig Dispense Refill Last Dose/Taking    aspirin (ECOTRIN) 81 MG EC tablet Take 1 tablet (81 mg total) by mouth once daily. 30 tablet 2 6/16/2025    labetaloL (NORMODYNE) 200 MG tablet Take 1 tablet (200 mg total) by mouth 2 (two) times a day. (Patient taking differently: Take 200 mg by mouth 3 (three) times daily.) 60 tablet 11 6/16/2025    lansoprazole (PREVACID) 15 MG capsule Take 1 capsule (15 mg total) by mouth once daily. 30 capsule 11 Past Week    PNV,calcium 72-iron-folic acid (PRENATAL VITAMIN PLUS LOW IRON) 27 mg iron- 1 mg Tab Take 1 tablet (1 each total) by mouth once daily. 30 tablet 11 6/15/2025    NIFEdipine (PROCARDIA-XL) 60 MG (OSM) 24 hr tablet Take 1 tablet (60 mg total) by mouth 2 (two) times a day. 60 tablet 6    [2]   Social History  Socioeconomic History    Marital status:  Single    Number of children: 4   Tobacco Use    Smoking status: Former     Types: Cigarettes     Passive exposure: Never    Smokeless tobacco: Never   Vaping Use    Vaping status: Never Used   Substance and Sexual Activity    Alcohol use: Not Currently    Drug use: Never    Sexual activity: Yes     Partners: Male     Social Drivers of Health     Financial Resource Strain: Low Risk  (6/16/2025)    Overall Financial Resource Strain (CARDIA)     Difficulty of Paying Living Expenses: Not hard at all   Food Insecurity: No Food Insecurity (6/16/2025)    Hunger Vital Sign     Worried About Running Out of Food in the Last Year: Never true     Ran Out of Food in the Last Year: Never true   Transportation Needs: No Transportation Needs (6/16/2025)    PRAPARE - Transportation     Lack of Transportation (Medical): No     Lack of Transportation (Non-Medical): No   Physical Activity: Inactive (5/21/2025)    Exercise Vital Sign     Days of Exercise per Week: 0 days     Minutes of Exercise per Session: 0 min   Stress: No Stress Concern Present (6/16/2025)    [employer] - Occupational Stress Questionnaire     Feeling of Stress : Not at all   Housing Stability: Low Risk  (6/16/2025)    Housing Stability Vital Sign     Unable to Pay for Housing in the Last Year: No     Number of Times Moved in the Last Year: 0     Homeless in the Last Year: No   Recent Concern: Housing Stability - High Risk (5/21/2025)    Housing Stability Vital Sign     Unable to Pay for Housing in the Last Year: Yes     Number of Times Moved in the Last Year: 0     Homeless in the Last Year: No

## 2025-06-17 NOTE — PLAN OF CARE
Problem: Adult Inpatient Plan of Care  Goal: Plan of Care Review  Outcome: Progressing  Goal: Patient-Specific Goal (Individualized)  Outcome: Progressing  Goal: Absence of Hospital-Acquired Illness or Injury  Outcome: Progressing  Goal: Optimal Comfort and Wellbeing  Outcome: Progressing  Goal: Readiness for Transition of Care  Outcome: Progressing     Problem:  Fall Injury Risk  Goal: Absence of Fall, Infant Drop and Related Injury  Outcome: Progressing     Problem: Hypertensive Disorders in Pregnancy  Goal: Patient-Fetal Stabilization  Outcome: Progressing

## 2025-06-17 NOTE — PLAN OF CARE
Problem: Adult Inpatient Plan of Care  Goal: Plan of Care Review  2025 by Steffen Singh RN  Outcome: Progressing  2025 by Steffen Singh RN  Outcome: Progressing  Goal: Patient-Specific Goal (Individualized)  2025 by Steffen Singh RN  Outcome: Progressing  2025 by Steffen Singh RN  Outcome: Progressing  Goal: Absence of Hospital-Acquired Illness or Injury  2025 by Steffen Singh RN  Outcome: Progressing  2025 by Steffen Singh RN  Outcome: Progressing  Goal: Optimal Comfort and Wellbeing  2025 by Steffen Singh RN  Outcome: Progressing  2025 by Steffen Singh RN  Outcome: Progressing  Goal: Readiness for Transition of Care  2025 by Steffen Singh RN  Outcome: Progressing  2025 by Steffen Singh RN  Outcome: Progressing     Problem:  Fall Injury Risk  Goal: Absence of Fall, Infant Drop and Related Injury  2025 by Steffen Singh RN  Outcome: Progressing  2025 by Steffen Singh RN  Outcome: Progressing     Problem: Hypertensive Disorders in Pregnancy  Goal: Patient-Fetal Stabilization  2025 by Steffen Singh RN  Outcome: Progressing  2025 by Steffen Singh RN  Outcome: Progressing

## 2025-06-17 NOTE — CARE UPDATE
OB Transition of Care Note      I have received checkout from Dr. Carlos regarding this patient.     Pt is a  with an IUP at 33w6d currently admitted for monitoring for NR NST on MFM visit on . Had fetal decel last night. MFM recommending IOL for nonreassuring FHR with known FGR, isoimmunization, and uteroplacental insufficiency. S/p 12h mag sulfate. S/p steroid x 2 doses.    Last Cervical Check:   Time:1525  Dilation/Station/Effacement: 0/25/-3    Pregnancy Complications / Other Co-Morbid Conditions:   chronic HTN, hx of maternal syphilis, Anti-D antibodies positive, FGR    Overnight Management:   MFM following. Continuous monitoring. Monitor BP; continue home labetalol 200mg TID and procardia 60mg BID.     Please call [phone] from  4PM to  7AM if any issues arise.    Grecia Fritz MD  [facility], HO-2

## 2025-06-17 NOTE — PROGRESS NOTES
PROGRESS NOTE  ANTEPARTUM    Admit Date: 6/16/2025   LOS: 0 days     Reason for Admission:  NST (non-stress test) nonreactive    SUBJECTIVE:     Savanah Pang is a 35 y.o. female at 33w6d who is here for monitoring for NR NST on M visit on 6/16. Overnight patient had 1 episode of decel which improved with cessation of magnesium. Possible sequela of decreased blood pressure vs mag toxicity, will continue to monitor. Patient asymptomatic. Denies complaints this AM. Denies headache, vision changes, CP, SOB, abdominal pain, dysuria.     Patient reports None contractions, active fetal movement, No vaginal bleeding/discharge , No loss of fluid    Scheduled Meds:   labetaloL  200 mg Oral Q8H    morphine  4 mg Intravenous Once    NIFEdipine  60 mg Oral BID    prenatal vitamin  1 tablet Oral Daily     Continuous Infusions:   lactated ringers   Intravenous Continuous 125 mL/hr at 06/17/25 0801 New Bag at 06/17/25 0801    magnesium sulfate in water  2 g/hr Intravenous Continuous   Stopped at 06/17/25 0358     PRN Meds:  Current Facility-Administered Medications:     acetaminophen, 650 mg, Oral, Q6H PRN    butalbital-acetaminophen-caffeine -40 mg, 1 tablet, Oral, Q4H PRN    calcium carbonate, 500 mg, Oral, TID PRN    calcium gluconate, 1 g, Intravenous, PRN    dextrose 50%, 12.5 g, Intravenous, PRN    dextrose 50%, 25 g, Intravenous, PRN    diphenhydrAMINE, 25 mg, Oral, Q4H PRN    diphenhydrAMINE, 25 mg, Intravenous, Q4H PRN    glucagon (human recombinant), 1 mg, Intramuscular, PRN    glucose, 16 g, Oral, PRN    glucose, 24 g, Oral, PRN    hydrALAZINE, 10 mg, Intravenous, Once PRN    labetalol, 40 mg, Intravenous, Once PRN    labetalol, 80 mg, Intravenous, Once PRN    naloxone, 0.02 mg, Intravenous, PRN    ondansetron, 8 mg, Oral, Q8H PRN    senna-docusate, 1 tablet, Oral, Nightly PRN    simethicone, 1 tablet, Oral, Q6H PRN    sodium chloride 0.9%, 10 mL, Intravenous, Q12H PRN    sodium chloride 0.9%, 10 mL,  Intravenous, PRN    Review of patient's allergies indicates:  No Known Allergies    OBJECTIVE:     Vital Signs (Most Recent)  Temp: 97.9 °F (36.6 °C) (06/17/25 0720)  Pulse: 93 (06/17/25 0738)  Resp: 17 (06/17/25 0720)  BP: 129/71 (06/17/25 0720)  SpO2: 98 % (06/17/25 0738)    Temperature Range Min/Max (Last 24H):  Temp:  [97.9 °F (36.6 °C)-98.4 °F (36.9 °C)]     Vital Signs Range (Last 24H):  Temp:  [97.9 °F (36.6 °C)-98.4 °F (36.9 °C)]   Pulse:  [0-104]   Resp:  [15-17]   BP: ()/()   SpO2:  [86 %-100 %]     I & O (Last 24H):  Intake/Output Summary (Last 24 hours) at 6/17/2025 0821  Last data filed at 6/17/2025 0800  Gross per 24 hour   Intake 704.08 ml   Output 3190 ml   Net -2485.92 ml       Physical Exam:  General: well developed, well nourished  Lungs:  clear to auscultation bilaterally and normal respiratory effort  Heart: regular rate and rhythm, S1, S2 normal, no murmur, click, rub or gallop  Abdomen: normal findings: bowel sounds normal and fundus palpable above umbilicus, bowel sounds present, non tender to palpation  Extremities: no cyanosis or edema, or clubbing and palpable DP 2+ bilaterally    FHT: 140-155, Cat 2 (reassuring), decels present overnight                 TOCO: No contractions     Laboratory:  I have reviewed all pertinent lab results within the past 24 hours.  Mag is therapeutic at 5.6, POCT ROM negative, Urine protein creatinine ratio 1.0, syphyllis Ab reactive, RPR negative    ASSESSMENT/PLAN:     Active Hospital Problems    Diagnosis  POA    *NST (non-stress test) nonreactive [O28.8]  Yes     Reports decreased fetal movement on morning of admission, reports increased fetal movements since admission  Reports Non-reactive NST at [facility] office, admitted for Observation with continuous fetal monitoring, FHT on admission with Cat 1 moderate variability  Ordered Betamethasone 12 mg x2 in case of imminent delivery  MFM consulted, appreciate recommendations      - Fetal growth  restriction antepartum [O36.3924]  Yes    - Chronic hypertension affecting pregnancy [O10.919]  Yes     Hypertensive on admission with /94, patient reports adherrence to home antihypertensives  Improvement of BP to 140s/100s, continue home BP regiment of Labetalol 200mg TID and Procardia 60mg BID  Denies severe features including headache, vision changes, decreased urination, SOB, or extremity edema         Resolved Hospital Problems   No resolved problems to display.       Assessment:   35 y.o.female  at 33w6d HD#1 for continuous fetal monitoring for non reactive NST on , patient started on Betamethasone and Mag for deceleration overnight, will continue to monitor, per [facility] recommendations. Denies severe features including headache, vision changes, decreased urination, SOB, or extremity edema   condition unchanged    Plan:  Continue fetal monitoring for repeat decelerations  Maternal hypertension improved, s/p x1 labetolol IV for severe range BP at 175/115 on day of admission, BP normotensive in 110s-130s systolic and 60-80s diastolic this AM  Continue home regiment of Labetalol 200mg TID and procardia 60mg BID, monitor for hypotension and hold as needed  Continue scheduled Dexamethasone, second dose this afternoon  Dispo per MFM recs  MFM consulted, recommendations appreciated      MD ALEXIS Alarcon  HO-1

## 2025-06-18 ENCOUNTER — ANESTHESIA EVENT (OUTPATIENT)
Dept: OBSTETRICS AND GYNECOLOGY | Facility: HOSPITAL | Age: 35
End: 2025-06-18
Payer: MEDICAID

## 2025-06-18 ENCOUNTER — ANESTHESIA (OUTPATIENT)
Dept: OBSTETRICS AND GYNECOLOGY | Facility: HOSPITAL | Age: 35
End: 2025-06-18
Payer: MEDICAID

## 2025-06-18 VITALS — RESPIRATION RATE: 14 BRPM

## 2025-06-18 LAB
ROSETTE - FMH (FETAL BLEED SCREEN): NORMAL
T PALLIDUM AB SER QL AGGL: POSITIVE

## 2025-06-18 PROCEDURE — 85461 HEMOGLOBIN FETAL: CPT

## 2025-06-18 PROCEDURE — 25000003 PHARM REV CODE 250

## 2025-06-18 PROCEDURE — 51702 INSERT TEMP BLADDER CATH: CPT

## 2025-06-18 PROCEDURE — 36415 COLL VENOUS BLD VENIPUNCTURE: CPT

## 2025-06-18 PROCEDURE — 63600175 PHARM REV CODE 636 W HCPCS: Performed by: OBSTETRICS & GYNECOLOGY

## 2025-06-18 PROCEDURE — 88307 TISSUE EXAM BY PATHOLOGIST: CPT | Performed by: OBSTETRICS & GYNECOLOGY

## 2025-06-18 PROCEDURE — 99233 SBSQ HOSP IP/OBS HIGH 50: CPT | Mod: ,,, | Performed by: NURSE PRACTITIONER

## 2025-06-18 PROCEDURE — 25000003 PHARM REV CODE 250: Performed by: NURSE PRACTITIONER

## 2025-06-18 PROCEDURE — 25000003 PHARM REV CODE 250: Performed by: ANESTHESIOLOGY

## 2025-06-18 PROCEDURE — 63600175 PHARM REV CODE 636 W HCPCS

## 2025-06-18 PROCEDURE — 63600175 PHARM REV CODE 636 W HCPCS: Performed by: ANESTHESIOLOGY

## 2025-06-18 PROCEDURE — 11000001 HC ACUTE MED/SURG PRIVATE ROOM

## 2025-06-18 PROCEDURE — 25000003 PHARM REV CODE 250: Performed by: OBSTETRICS & GYNECOLOGY

## 2025-06-18 PROCEDURE — 72100003 HC LABOR CARE, EA. ADDL. 8 HRS

## 2025-06-18 PROCEDURE — 72200005 HC VAGINAL DELIVERY LEVEL II

## 2025-06-18 PROCEDURE — 63600175 PHARM REV CODE 636 W HCPCS: Performed by: SPECIALIST

## 2025-06-18 PROCEDURE — 3E0P7VZ INTRODUCTION OF HORMONE INTO FEMALE REPRODUCTIVE, VIA NATURAL OR ARTIFICIAL OPENING: ICD-10-PCS | Performed by: OBSTETRICS & GYNECOLOGY

## 2025-06-18 PROCEDURE — 62326 NJX INTERLAMINAR LMBR/SAC: CPT | Performed by: ANESTHESIOLOGY

## 2025-06-18 PROCEDURE — 27000492 HC SLEEVE, SCD T/L

## 2025-06-18 PROCEDURE — 72100002 HC LABOR CARE, 1ST 8 HOURS

## 2025-06-18 RX ORDER — EPHEDRINE SULFATE 50 MG/ML
10 INJECTION, SOLUTION INTRAVENOUS
Status: DISCONTINUED | OUTPATIENT
Start: 2025-06-18 | End: 2025-06-20 | Stop reason: HOSPADM

## 2025-06-18 RX ORDER — OXYTOCIN-SODIUM CHLORIDE 0.9% IV SOLN 30 UNIT/500ML 30-0.9/5 UT/ML-%
95 SOLUTION INTRAVENOUS ONCE AS NEEDED
Status: DISCONTINUED | OUTPATIENT
Start: 2025-06-18 | End: 2025-06-20 | Stop reason: HOSPADM

## 2025-06-18 RX ORDER — OXYTOCIN 10 [USP'U]/ML
10 INJECTION, SOLUTION INTRAMUSCULAR; INTRAVENOUS ONCE AS NEEDED
Status: DISCONTINUED | OUTPATIENT
Start: 2025-06-18 | End: 2025-06-18

## 2025-06-18 RX ORDER — SODIUM CHLORIDE 0.9 % (FLUSH) 0.9 %
10 SYRINGE (ML) INJECTION
Status: DISCONTINUED | OUTPATIENT
Start: 2025-06-18 | End: 2025-06-20 | Stop reason: HOSPADM

## 2025-06-18 RX ORDER — DIPHENHYDRAMINE HCL 25 MG
25 CAPSULE ORAL EVERY 4 HOURS PRN
Status: DISCONTINUED | OUTPATIENT
Start: 2025-06-18 | End: 2025-06-20 | Stop reason: HOSPADM

## 2025-06-18 RX ORDER — ONDANSETRON 4 MG/1
8 TABLET, ORALLY DISINTEGRATING ORAL EVERY 8 HOURS PRN
Status: DISCONTINUED | OUTPATIENT
Start: 2025-06-18 | End: 2025-06-18

## 2025-06-18 RX ORDER — OXYTOCIN-SODIUM CHLORIDE 0.9% IV SOLN 30 UNIT/500ML 30-0.9/5 UT/ML-%
95 SOLUTION INTRAVENOUS CONTINUOUS PRN
Status: DISCONTINUED | OUTPATIENT
Start: 2025-06-18 | End: 2025-06-20 | Stop reason: HOSPADM

## 2025-06-18 RX ORDER — DOCUSATE SODIUM 100 MG/1
200 CAPSULE, LIQUID FILLED ORAL 2 TIMES DAILY PRN
Status: DISCONTINUED | OUTPATIENT
Start: 2025-06-18 | End: 2025-06-20 | Stop reason: HOSPADM

## 2025-06-18 RX ORDER — METHYLERGONOVINE MALEATE 0.2 MG/ML
200 INJECTION INTRAVENOUS ONCE AS NEEDED
Status: DISCONTINUED | OUTPATIENT
Start: 2025-06-18 | End: 2025-06-20 | Stop reason: HOSPADM

## 2025-06-18 RX ORDER — HYDROCODONE BITARTRATE AND ACETAMINOPHEN 10; 325 MG/1; MG/1
1 TABLET ORAL EVERY 4 HOURS PRN
Status: DISCONTINUED | OUTPATIENT
Start: 2025-06-18 | End: 2025-06-20 | Stop reason: HOSPADM

## 2025-06-18 RX ORDER — ACETAMINOPHEN 325 MG/1
650 TABLET ORAL EVERY 6 HOURS SCHEDULED
Status: DISCONTINUED | OUTPATIENT
Start: 2025-06-18 | End: 2025-06-20 | Stop reason: HOSPADM

## 2025-06-18 RX ORDER — ONDANSETRON HYDROCHLORIDE 2 MG/ML
4 INJECTION, SOLUTION INTRAVENOUS EVERY 6 HOURS PRN
Status: DISCONTINUED | OUTPATIENT
Start: 2025-06-18 | End: 2025-06-20 | Stop reason: HOSPADM

## 2025-06-18 RX ORDER — BUPIVACAINE HYDROCHLORIDE 2.5 MG/ML
INJECTION, SOLUTION EPIDURAL; INFILTRATION; INTRACAUDAL; PERINEURAL
Status: COMPLETED | OUTPATIENT
Start: 2025-06-18 | End: 2025-06-18

## 2025-06-18 RX ORDER — OXYTOCIN-SODIUM CHLORIDE 0.9% IV SOLN 30 UNIT/500ML 30-0.9/5 UT/ML-%
0-32 SOLUTION INTRAVENOUS CONTINUOUS
Status: DISCONTINUED | OUTPATIENT
Start: 2025-06-18 | End: 2025-06-18

## 2025-06-18 RX ORDER — IBUPROFEN 600 MG/1
600 TABLET, FILM COATED ORAL EVERY 6 HOURS
Status: DISCONTINUED | OUTPATIENT
Start: 2025-06-18 | End: 2025-06-20 | Stop reason: HOSPADM

## 2025-06-18 RX ORDER — DIPHENHYDRAMINE HYDROCHLORIDE 50 MG/ML
25 INJECTION, SOLUTION INTRAMUSCULAR; INTRAVENOUS EVERY 4 HOURS PRN
Status: DISCONTINUED | OUTPATIENT
Start: 2025-06-18 | End: 2025-06-20 | Stop reason: HOSPADM

## 2025-06-18 RX ORDER — HYDROCODONE BITARTRATE AND ACETAMINOPHEN 5; 325 MG/1; MG/1
1 TABLET ORAL EVERY 4 HOURS PRN
Status: DISCONTINUED | OUTPATIENT
Start: 2025-06-18 | End: 2025-06-20 | Stop reason: HOSPADM

## 2025-06-18 RX ORDER — DIPHENOXYLATE HYDROCHLORIDE AND ATROPINE SULFATE 2.5; .025 MG/1; MG/1
2 TABLET ORAL EVERY 6 HOURS PRN
Status: DISCONTINUED | OUTPATIENT
Start: 2025-06-18 | End: 2025-06-20 | Stop reason: HOSPADM

## 2025-06-18 RX ORDER — CARBOPROST TROMETHAMINE 250 UG/ML
250 INJECTION, SOLUTION INTRAMUSCULAR
Status: DISCONTINUED | OUTPATIENT
Start: 2025-06-18 | End: 2025-06-20 | Stop reason: HOSPADM

## 2025-06-18 RX ORDER — OXYTOCIN-SODIUM CHLORIDE 0.9% IV SOLN 30 UNIT/500ML 30-0.9/5 UT/ML-%
10 SOLUTION INTRAVENOUS ONCE AS NEEDED
Status: DISCONTINUED | OUTPATIENT
Start: 2025-06-18 | End: 2025-06-20 | Stop reason: HOSPADM

## 2025-06-18 RX ORDER — HYDROCORTISONE 25 MG/G
CREAM TOPICAL 3 TIMES DAILY PRN
Status: DISCONTINUED | OUTPATIENT
Start: 2025-06-18 | End: 2025-06-20 | Stop reason: HOSPADM

## 2025-06-18 RX ORDER — NALOXONE HCL 0.4 MG/ML
0.02 VIAL (ML) INJECTION
Status: DISCONTINUED | OUTPATIENT
Start: 2025-06-18 | End: 2025-06-18

## 2025-06-18 RX ORDER — FENTANYL/BUPIVACAINE/NS/PF 2-1250MCG
PLASTIC BAG, INJECTION (ML) INJECTION
Status: DISCONTINUED | OUTPATIENT
Start: 2025-06-18 | End: 2025-06-18

## 2025-06-18 RX ORDER — PRENATAL WITH FERROUS FUM AND FOLIC ACID 3080; 920; 120; 400; 22; 1.84; 3; 20; 10; 1; 12; 200; 27; 25; 2 [IU]/1; [IU]/1; MG/1; [IU]/1; MG/1; MG/1; MG/1; MG/1; MG/1; MG/1; UG/1; MG/1; MG/1; MG/1; MG/1
1 TABLET ORAL DAILY
Status: DISCONTINUED | OUTPATIENT
Start: 2025-06-18 | End: 2025-06-18

## 2025-06-18 RX ORDER — MISOPROSTOL 100 UG/1
800 TABLET ORAL ONCE AS NEEDED
Status: DISCONTINUED | OUTPATIENT
Start: 2025-06-18 | End: 2025-06-18

## 2025-06-18 RX ORDER — SIMETHICONE 80 MG
1 TABLET,CHEWABLE ORAL EVERY 6 HOURS PRN
Status: DISCONTINUED | OUTPATIENT
Start: 2025-06-18 | End: 2025-06-18

## 2025-06-18 RX ORDER — FENTANYL/BUPIVACAINE/NS/PF 2-1250MCG
PLASTIC BAG, INJECTION (ML) INJECTION CONTINUOUS
Status: DISCONTINUED | OUTPATIENT
Start: 2025-06-18 | End: 2025-06-20 | Stop reason: HOSPADM

## 2025-06-18 RX ADMIN — BUPIVACAINE HYDROCHLORIDE 10 ML: 2.5 INJECTION, SOLUTION EPIDURAL; INFILTRATION; INTRACAUDAL; PERINEURAL at 07:06

## 2025-06-18 RX ADMIN — SODIUM CHLORIDE, SODIUM LACTATE, POTASSIUM CHLORIDE, CALCIUM CHLORIDE AND DEXTROSE MONOHYDRATE 500 ML: 5; 600; 310; 30; 20 INJECTION, SOLUTION INTRAVENOUS at 07:06

## 2025-06-18 RX ADMIN — LABETALOL HYDROCHLORIDE 200 MG: 200 TABLET, FILM COATED ORAL at 12:06

## 2025-06-18 RX ADMIN — NIFEDIPINE 60 MG: 60 TABLET, FILM COATED, EXTENDED RELEASE ORAL at 10:06

## 2025-06-18 RX ADMIN — SODIUM CHLORIDE, POTASSIUM CHLORIDE, SODIUM LACTATE AND CALCIUM CHLORIDE: 600; 310; 30; 20 INJECTION, SOLUTION INTRAVENOUS at 04:06

## 2025-06-18 RX ADMIN — PENICILLIN G 3 MILLION UNITS: 3000000 INJECTION, SOLUTION INTRAVENOUS at 07:06

## 2025-06-18 RX ADMIN — Medication 12 ML/HR: at 07:06

## 2025-06-18 RX ADMIN — NIFEDIPINE 60 MG: 60 TABLET, FILM COATED, EXTENDED RELEASE ORAL at 08:06

## 2025-06-18 RX ADMIN — ONDANSETRON 4 MG: 2 INJECTION INTRAMUSCULAR; INTRAVENOUS at 02:06

## 2025-06-18 RX ADMIN — Medication 12 ML: at 07:06

## 2025-06-18 RX ADMIN — ACETAMINOPHEN 650 MG: 325 TABLET ORAL at 02:06

## 2025-06-18 RX ADMIN — OXYTOCIN-SODIUM CHLORIDE 0.9% IV SOLN 30 UNIT/500ML 10 UNITS: 30-0.9/5 SOLUTION at 11:06

## 2025-06-18 RX ADMIN — LABETALOL HYDROCHLORIDE 300 MG: 200 TABLET, FILM COATED ORAL at 08:06

## 2025-06-18 RX ADMIN — Medication 2 MILLI-UNITS/MIN: at 08:06

## 2025-06-18 RX ADMIN — IBUPROFEN 600 MG: 600 TABLET, FILM COATED ORAL at 02:06

## 2025-06-18 RX ADMIN — SODIUM CHLORIDE, POTASSIUM CHLORIDE, SODIUM LACTATE AND CALCIUM CHLORIDE 1000 ML: 600; 310; 30; 20 INJECTION, SOLUTION INTRAVENOUS at 06:06

## 2025-06-18 RX ADMIN — MAGNESIUM SULFATE HEPTAHYDRATE 4 G/HR: 40 INJECTION, SOLUTION INTRAVENOUS at 11:06

## 2025-06-18 RX ADMIN — MAGNESIUM SULFATE HEPTAHYDRATE 2 G/HR: 40 INJECTION, SOLUTION INTRAVENOUS at 11:06

## 2025-06-18 RX ADMIN — ONDANSETRON 8 MG: 4 TABLET, ORALLY DISINTEGRATING ORAL at 09:06

## 2025-06-18 NOTE — PLAN OF CARE
Problem: Adult Inpatient Plan of Care  Goal: Plan of Care Review  Outcome: Progressing  Goal: Optimal Comfort and Wellbeing  Outcome: Progressing     Problem: Hypertensive Disorders in Pregnancy  Goal: Patient-Fetal Stabilization  Outcome: Progressing     Problem: Infection  Goal: Absence of Infection Signs and Symptoms  Outcome: Progressing     Problem: Labor  Goal: Hemostasis  Outcome: Progressing  Goal: Stable Fetal Wellbeing  Outcome: Progressing  Goal: Effective Progression to Delivery  Outcome: Progressing  Goal: Absence of Infection Signs and Symptoms  Outcome: Progressing  Goal: Acceptable Pain Control  Outcome: Progressing  Goal: Normal Uterine Contraction Pattern  Outcome: Progressing

## 2025-06-18 NOTE — PLAN OF CARE
Problem: Adult Inpatient Plan of Care  Goal: Plan of Care Review  Outcome: Progressing  Goal: Patient-Specific Goal (Individualized)  Outcome: Progressing  Goal: Absence of Hospital-Acquired Illness or Injury  Outcome: Progressing  Goal: Optimal Comfort and Wellbeing  Outcome: Progressing  Goal: Readiness for Transition of Care  Outcome: Progressing     Problem:  Fall Injury Risk  Goal: Absence of Fall, Infant Drop and Related Injury  Outcome: Progressing     Problem: Hypertensive Disorders in Pregnancy  Goal: Patient-Fetal Stabilization  Outcome: Progressing     Problem: Infection  Goal: Absence of Infection Signs and Symptoms  Outcome: Progressing     Problem: Labor  Goal: Hemostasis  Outcome: Progressing  Goal: Stable Fetal Wellbeing  Outcome: Progressing  Goal: Effective Progression to Delivery  Outcome: Progressing  Goal: Absence of Infection Signs and Symptoms  Outcome: Progressing  Goal: Acceptable Pain Control  Outcome: Progressing  Goal: Normal Uterine Contraction Pattern  Outcome: Progressing

## 2025-06-18 NOTE — PROGRESS NOTES
Progress Note  Maternal Fetal Medicine          Subjective:      IUP at 34w0d - history of cHTN, IUGR/ elevated S/D ratio, antiD isoimmunization who is admitted for abnormal  testing and hypertension.     She is currently on L&D being induced. Induction managed by primary OB team. Patient reports her water recently broke and she's currently 4cm. Just had epidural placed.   Nursing staff reports Bps have been normal to low mild range. Denies neuro s/s. Reports some FHR decels but primary OB team aware.      Objective:         Temp:  [97.7 °F (36.5 °C)-98.6 °F (37 °C)] 98.1 °F (36.7 °C)  Pulse:  [] 76  Resp:  [14-20] 14  SpO2:  [92 %-100 %] 100 %  BP: (121-152)/() 123/77    Gen: NAD, A&Ox3  Pulm: Unlabored breathing, LCTAB  Card: RRR  Abd: FHT present, soft, nondistended, nontender to palpation, gravid uterus palpable c/w gestational age  Extremities: Palpable peripheral pulses, trace pedal edema, 2+ DTRs x 4    NST: 140 baseline, moderate BTBV, pos accelerations, variable decelerations  [place]: Q 2-4  Last EFW 25: EFW 1538g (14%), AC 4% - FGR  Last limited US 25: AFV normal. Umbilical artery dopplers with elevated S/D ratio. MCAs 1.44 MoM. BPP 6/8 for fetal breathing. Reflexed to NST - nonreactive. Minimal variability, no accels.     Lab Review  Blood Type: O NEG    Recent Results (from the past 24 hours)   Strep Group B by PCR    Collection Time: 25  9:34 PM   Result Value Ref Range    STREP B PCR ([facility]) GBS Presumptive Not Detected GBS Presumptive Not Detected    STREP B CULTURE         Assessment:       35 y.o.  at 34w0d weeks gestation admitted for abnormal fetal monitoring in the setting of IUGR, elevated S/D ratio, known anti-D isoimmunization and chronic HTN.     Active Hospital Problems    Diagnosis  POA    *NST (non-stress test) nonreactive [O28.8]  Yes     Reports decreased fetal movement on morning of admission, reports increased fetal movements since  admission  Reports Non-reactive NST at [facility] office, admitted for Observation with continuous fetal monitoring, FHT on admission with Cat 1 moderate variability  Ordered Betamethasone 12 mg x2 in case of imminent delivery  [facility] consulted, appreciate recommendations      - Fetal growth restriction antepartum [O36.5695]  Yes    - Chronic hypertension affecting pregnancy [O10.919]  Yes     Hypertensive on admission with /94, patient reports adherrence to home antihypertensives  Improvement of BP to 140s/100s, continue home BP regiment of Labetalol 200mg TID and Procardia 60mg BID  Denies severe features including headache, vision changes, decreased urination, SOB, or extremity edema         Resolved Hospital Problems   No resolved problems to display.        Plan:     1. Non reassuring  testing (POA), IUGR  - in the office her BPP was 6/8, NST was nonreactive.  She was sent in for inpatient monitoring.    Steroids completed  &   - status post 12 hours magnesium sulfate (severe range Bps)  - she is on continuous monitoring.  Overall, the tracing is category 2 with multiple decelerations overnight that resolved spontaneously.   - the pregnancy has been complicated by anti-D isoimmunization and IUGR noted recently.  We are concerned that the baby may have some hemolytic disease.  MCA Dopplers Monday were 1.44 MoMs (borderline).  - NICU consult   - Induction recommended due to findings. Currently in process of IOL - managed by primary OB team.     2. Superimposed preeclampsia  - the patient has a history of chronic hypertension on multiple medications.  Continue home medications at this time.  I counseled the patient regarding the suspected diagnosis. Other causes of elevated blood pressure and proteinuria include chronic renal disease, autoimmune disorders, chronic hypertension, and diabetes. Additionally, certain urinary tract pathogens can cause proteinuria, however, are unlikely to be associated  with hypertension unless prolonged kidney disease is present. I reviewed the diagnosis of preeclampsia, the potential course of this disease, maternal-fetal complications, the lack of any available therapy to prevent progression, the likelihood of resolution post-delivery, and the potential for recurrence in a subsequent pregnancy. I counseled the patient that one of the major fetal considerations in a pregnancy complicated by preeclampsia are those that are related to prematurity. With the presence of severely elevated blood pressures since admit, HA and proteinuria, she meets diagnostic criteria for si pre-eclampsia.   Recommendations:  She is s/p magnesium sulfate. If uncontrolled severe range Bps, lab abnormalities, or neuro symptoms become apparent, restart magnesium for seizure prophylaxis. Due to her gestational age and added benefit of neuroprotection, recommend 6g load then 2g/hr. Post delivery, recommend 24hrs of Mag.  Steroids complete 6/16-17  Proceed with induction of labor at this time.  See above.  Titrate nifedipine and labetalol as needed to maintain mild range blood pressures are normotensive.  Postpartum goal is less than 140/90.    3. hx syphilis (POA)  - Hx of syphilis initially diagnosed in 2017 and treated at that time.   - RPR has been non-reactive since then. (Most recently checked on admit 6/16)    Thank you for allowing us to participate in the care of your patient. If you have any questions/concerns, please do not hesitate to contact us.     Adele Sainz, MSN, APRN, WHNP-BC  Maternal Fetal Medicine

## 2025-06-18 NOTE — ANESTHESIA PREPROCEDURE EVALUATION
"                                                                                                             2025  Savanah Pang is a 35 y.o., female with IUP in labor for labor epidural.      OB note 2 weeks ago:  "Savanah Pang is a 35 y.o.  female with IUP at 33w6d with significant history of cHTN, IUGR/ elevated S/D ratio, antiD isoimmunization who is admitted for abnormal  testing and hypertension.     The patient was sent yesterday from clinic due BPP  with nonreactive NST.  When she arrived to the hospital her tracing continued to look nonreassuring and category 2 and she was admitted to the hospital.  Notably her blood pressure was also elevated upon admission to the upper mild range and some severe ranges.    Yesterday evening she was started on magnesium sulfate due to hypertension and given steroids.  She has been on continuous monitoring with some noted late decelerations.     Magnesium stopped overnight. She has persistent HA+.       PMHx:   Past Medical History:   Diagnosis Date    Hypertension        PSHx: History reviewed. No pertinent surgical history."    Pre-op Assessment    I have reviewed the Patient Summary Reports.     I have reviewed the Nursing Notes. I have reviewed the NPO Status.   I have reviewed the Medications.     Review of Systems  Anesthesia Hx:             Denies Family Hx of Anesthesia complications.    Denies Personal Hx of Anesthesia complications.                    Cardiovascular:     Hypertension                                          Pulmonary:  Pulmonary Normal                       Neurological:    Neuromuscular Disease,                                       Physical Exam  General: Well nourished, Cooperative, Alert and Oriented    Airway:  Mallampati: II   Mouth Opening: Normal  TM Distance: Normal  Tongue: Normal  Neck ROM: Normal ROM    Dental:  Intact    Chest/Lungs:  Normal Respiratory Rate    Heart:  Rate: Normal  Rhythm: Regular " Rhythm        Anesthesia Plan  Type of Anesthesia, risks & benefits discussed:    Anesthesia Type: Epidural  Intra-op Monitoring Plan: Standard ASA Monitors  Informed Consent: Informed consent signed with the Patient and all parties understand the risks and agree with anesthesia plan.  All questions answered.   ASA Score: 2  Day of Surgery Review of History & Physical: H&P Update referred to the surgeon/provider.    Ready For Surgery From Anesthesia Perspective.     .

## 2025-06-18 NOTE — PROGRESS NOTES
06/17/25 2015   [facility] Note - Strip   Strip Reviewed by [place] Nurse? Yes   [facility] Note - Communication   [place] Nurse Communicated with Bedside Nurse Regarding: Fetal Status   [facility] [place] Note - Notification   Nurse Notified? Yes

## 2025-06-18 NOTE — PROGRESS NOTES
LABOR PROGRESS NOTE:     35 y.o.  @ 34w0d induction of labor due to non reassuring fetal heart tones in setting of IUGR and superimposed Pre-E    Temp:  [97.7 °F (36.5 °C)-98.6 °F (37 °C)] 98 °F (36.7 °C)  Pulse:  [] 81  Resp:  [14-20] 14  SpO2:  [92 %-100 %] 98 %  BP: (121-152)/() 151/91    FHT:  120-140 bpm   Variability: moderate   Accelerations:  Reactive   Decelerations:  Occurs occurs with < 50%   Category:  Cat 2   Contractions:  Q3-4 minutes       SVE: 5/80/-3       Membranes:  SROM  @ 0718   Augmentation: Pitocin @ 2 rosales-units/min   GBS Status:  GBS presumptive not detected on PCR, started IV penicillin GBS ppx on    Pain: s/p epidural        Dispo: Induction of labor, MFM following, s/p Betamethasone x2, continue nifedipine and labetolol. Will need post delivery mag.      Discussed with MD Amie Salvador MD [facility]  HO-1

## 2025-06-18 NOTE — ANESTHESIA PROCEDURE NOTES
Epidural    Patient location during procedure: OB   Reason for block: primary anesthetic   Reason for block: labor analgesia requested by patient and obstetrician  Diagnosis: Labor   Start time: 6/18/2025 7:10 AM  Timeout: 6/18/2025 7:05 AM  End time: 6/18/2025 7:28 AM    Staffing  Performing Provider: Shahriar Engle MD  Authorizing Provider: Shahriar Engle MD    Staffing  Performed by: Shahriar Engle MD  Authorized by: Shahriar Engle MD        Preanesthetic Checklist  Completed: patient identified, IV checked, site marked, risks and benefits discussed, surgical consent, monitors and equipment checked, pre-op evaluation, timeout performed, anesthesia consent given, hand hygiene performed and patient being monitored  Preparation  Patient position: sitting  Prep: ChloraPrep  Patient monitoring: Pulse Ox and Blood Pressure  Reason for block: primary anesthetic   Epidural  Skin Anesthetic: lidocaine 1%  Administration type: continuous  Approach: midline  Interspace: L3-4    Injection technique: MONICA air  Needle and Epidural Catheter  Needle type: Tuohy   Needle gauge: 17    Additional Documentation: incremental injection and negative aspiration for heme and CSF  Needle localization: anatomical landmarks  Assessment  Ease of block: easy  Patient's tolerance of the procedure: no complaints No inadvertent dural puncture with Tuohy.  Dural puncture not performed with spinal needle    Medications:    Medications: bupivacaine (pf) (MARCAINE) injection 0.25% - Epidural   10 mL - 6/18/2025 7:26:00 AM

## 2025-06-19 LAB
BASOPHILS # BLD AUTO: 0.02 X10(3)/MCL
BASOPHILS NFR BLD AUTO: 0.2 %
EOSINOPHIL # BLD AUTO: 0.02 X10(3)/MCL (ref 0–0.9)
EOSINOPHIL NFR BLD AUTO: 0.2 %
ERYTHROCYTE [DISTWIDTH] IN BLOOD BY AUTOMATED COUNT: 12.7 % (ref 11.5–17)
HCT VFR BLD AUTO: 35.1 % (ref 37–47)
HGB BLD-MCNC: 11.3 G/DL (ref 12–16)
IMM GRANULOCYTES # BLD AUTO: 0.04 X10(3)/MCL (ref 0–0.04)
IMM GRANULOCYTES NFR BLD AUTO: 0.4 %
LYMPHOCYTES # BLD AUTO: 2.72 X10(3)/MCL (ref 0.6–4.6)
LYMPHOCYTES NFR BLD AUTO: 26 %
MCH RBC QN AUTO: 29.4 PG (ref 27–31)
MCHC RBC AUTO-ENTMCNC: 32.2 G/DL (ref 33–36)
MCV RBC AUTO: 91.2 FL (ref 80–94)
MONOCYTES # BLD AUTO: 0.73 X10(3)/MCL (ref 0.1–1.3)
MONOCYTES NFR BLD AUTO: 7 %
NEUTROPHILS # BLD AUTO: 6.93 X10(3)/MCL (ref 2.1–9.2)
NEUTROPHILS NFR BLD AUTO: 66.2 %
NRBC BLD AUTO-RTO: 0 %
PLATELET # BLD AUTO: 115 X10(3)/MCL (ref 130–400)
PLATELETS.RETICULATED NFR BLD AUTO: 9.2 % (ref 0.9–11.2)
PMV BLD AUTO: 11.9 FL (ref 7.4–10.4)
RBC # BLD AUTO: 3.85 X10(6)/MCL (ref 4.2–5.4)
RH IMMUNE GLOBULIN: NORMAL
STREP B CULTURE (OHS): NEGATIVE
STREP B PCR (OHS): NORMAL
WBC # BLD AUTO: 10.46 X10(3)/MCL (ref 4.5–11.5)

## 2025-06-19 PROCEDURE — 63600519 RHOGAM PHARM REV CODE 636 ALT 250 W HCPCS: Performed by: OBSTETRICS & GYNECOLOGY

## 2025-06-19 PROCEDURE — 11000001 HC ACUTE MED/SURG PRIVATE ROOM

## 2025-06-19 PROCEDURE — 99233 SBSQ HOSP IP/OBS HIGH 50: CPT | Mod: ,,, | Performed by: OBSTETRICS & GYNECOLOGY

## 2025-06-19 PROCEDURE — 85025 COMPLETE CBC W/AUTO DIFF WBC: CPT

## 2025-06-19 PROCEDURE — 25000003 PHARM REV CODE 250

## 2025-06-19 PROCEDURE — 25000003 PHARM REV CODE 250: Performed by: NURSE PRACTITIONER

## 2025-06-19 PROCEDURE — 25000003 PHARM REV CODE 250: Performed by: OBSTETRICS & GYNECOLOGY

## 2025-06-19 PROCEDURE — 36415 COLL VENOUS BLD VENIPUNCTURE: CPT

## 2025-06-19 PROCEDURE — 3E0334Z INTRODUCTION OF SERUM, TOXOID AND VACCINE INTO PERIPHERAL VEIN, PERCUTANEOUS APPROACH: ICD-10-PCS | Performed by: OBSTETRICS & GYNECOLOGY

## 2025-06-19 PROCEDURE — 63600175 PHARM REV CODE 636 W HCPCS: Performed by: OBSTETRICS & GYNECOLOGY

## 2025-06-19 RX ADMIN — ACETAMINOPHEN 650 MG: 325 TABLET ORAL at 05:06

## 2025-06-19 RX ADMIN — HUMAN RHO(D) IMMUNE GLOBULIN 300 MCG: 1500 SOLUTION INTRAMUSCULAR; INTRAVENOUS at 08:06

## 2025-06-19 RX ADMIN — LABETALOL HYDROCHLORIDE 300 MG: 200 TABLET, FILM COATED ORAL at 08:06

## 2025-06-19 RX ADMIN — SODIUM CHLORIDE, POTASSIUM CHLORIDE, SODIUM LACTATE AND CALCIUM CHLORIDE: 600; 310; 30; 20 INJECTION, SOLUTION INTRAVENOUS at 04:06

## 2025-06-19 RX ADMIN — ACETAMINOPHEN 650 MG: 325 TABLET ORAL at 11:06

## 2025-06-19 RX ADMIN — NIFEDIPINE 60 MG: 60 TABLET, FILM COATED, EXTENDED RELEASE ORAL at 08:06

## 2025-06-19 RX ADMIN — PRENATAL VITAMINS-IRON FUMARATE 27 MG IRON-FOLIC ACID 0.8 MG TABLET 1 TABLET: at 09:06

## 2025-06-19 RX ADMIN — MAGNESIUM SULFATE HEPTAHYDRATE 2 G/HR: 40 INJECTION, SOLUTION INTRAVENOUS at 04:06

## 2025-06-19 RX ADMIN — LABETALOL HYDROCHLORIDE 300 MG: 200 TABLET, FILM COATED ORAL at 11:06

## 2025-06-19 RX ADMIN — LABETALOL HYDROCHLORIDE 300 MG: 200 TABLET, FILM COATED ORAL at 04:06

## 2025-06-19 RX ADMIN — DOCUSATE SODIUM 200 MG: 100 CAPSULE, LIQUID FILLED ORAL at 08:06

## 2025-06-19 RX ADMIN — IBUPROFEN 600 MG: 600 TABLET, FILM COATED ORAL at 11:06

## 2025-06-19 RX ADMIN — IBUPROFEN 600 MG: 600 TABLET, FILM COATED ORAL at 05:06

## 2025-06-19 RX ADMIN — ACETAMINOPHEN 650 MG: 325 TABLET ORAL at 02:06

## 2025-06-19 NOTE — PROGRESS NOTES
Progress Note  Maternal Fetal Medicine          Subjective:      IUP at 34w0d - history of cHTN, IUGR/ elevated S/D ratio, antiD isoimmunization who is admitted for abnormal  testing and hypertension.     S/p  PPD#1    Doing well. On magnesium sulfate until noon. Urine op excellent. Bleeding and pain controlled. Using formula for feeding.   After delivery blood pressures remained elevated, I increase her labetalol to 300 mg every 8 hours.  She remains on nifedipine 60 mg b.i.d..      Objective:         Temp:  [97.6 °F (36.4 °C)-98.3 °F (36.8 °C)] 97.8 °F (36.6 °C)  Pulse:  [] 76  Resp:  [14-18] 18  SpO2:  [76 %-100 %] 97 %  BP: (111-160)/() 120/80    Gen: NAD, A&Ox3  Pulm: Unlabored breathing, LCTAB  Card: RRR  Abd: fundus firm  Extremities: Palpable peripheral pulses, trace pedal edema, 2+ DTRs x 4    Lab Review  Blood Type: O NEG    Recent Results (from the past 24 hours)   [facility] - [facility] (Fetal Bleed Screen)    Collection Time: 25  2:10 PM   Result Value Ref Range    [facility] (Fetal Bleed Screen) NEG    CBC with Differential    Collection Time: 25  4:34 AM   Result Value Ref Range    WBC 10.46 4.50 - 11.50 x10(3)/mcL    RBC 3.85 (L) 4.20 - 5.40 x10(6)/mcL    Hgb 11.3 (L) 12.0 - 16.0 g/dL    Hct 35.1 (L) 37.0 - 47.0 %    MCV 91.2 80.0 - 94.0 fL    MCH 29.4 27.0 - 31.0 pg    MCHC 32.2 (L) 33.0 - 36.0 g/dL    RDW 12.7 11.5 - 17.0 %    Platelet 115 (L) 130 - 400 x10(3)/mcL    MPV 11.9 (H) 7.4 - 10.4 fL    IPF 9.2 0.9 - 11.2 %    Neut % 66.2 %    Lymph % 26.0 %    Mono % 7.0 %    Eos % 0.2 %    Basophil % 0.2 %    Imm Grans % 0.4 %    Neut # 6.93 2.1 - 9.2 x10(3)/mcL    Lymph # 2.72 0.6 - 4.6 x10(3)/mcL    Mono # 0.73 0.1 - 1.3 x10(3)/mcL    Eos # 0.02 0 - 0.9 x10(3)/mcL    Baso # 0.02 <=0.2 x10(3)/mcL    Imm Gran # 0.04 0.00 - 0.04 x10(3)/mcL    NRBC% 0.0 %       Assessment:       35 y.o.  at 34w0d weeks gestation admitted for abnormal fetal monitoring in the setting of  IUGR, elevated S/D ratio, known anti-D isoimmunization and chronic HTN s/p  PPD#1    Active Hospital Problems    Diagnosis  POA    *NST (non-stress test) nonreactive [O28.8]  Yes     Reports decreased fetal movement on morning of admission, reports increased fetal movements since admission  Reports Non-reactive NST at [facility] office, admitted for Observation with continuous fetal monitoring, FHT on admission with Cat 1 moderate variability  Ordered Betamethasone 12 mg x2 in case of imminent delivery  [facility] consulted, appreciate recommendations      - Fetal growth restriction antepartum [O36.2990]  Yes    - Chronic hypertension affecting pregnancy [O10.919]  Yes     Hypertensive on admission with /94, patient reports adherrence to home antihypertensives  Improvement of BP to 140s/100s, continue home BP regiment of Labetalol 200mg TID and Procardia 60mg BID  Denies severe features including headache, vision changes, decreased urination, SOB, or extremity edema         Resolved Hospital Problems   No resolved problems to display.        Plan:     1. Superimposed preeclampsia  - the patient has a history of chronic hypertension on multiple medications.  Continue home medications at this time.  I counseled the patient regarding the suspected diagnosis. Other causes of elevated blood pressure and proteinuria include chronic renal disease, autoimmune disorders, chronic hypertension, and diabetes. Additionally, certain urinary tract pathogens can cause proteinuria, however, are unlikely to be associated with hypertension unless prolonged kidney disease is present. I reviewed the diagnosis of preeclampsia, the potential course of this disease, maternal-fetal complications, the lack of any available therapy to prevent progression, the likelihood of resolution post-delivery, and the potential for recurrence in a subsequent pregnancy. I counseled the patient that one of the major fetal considerations in a pregnancy  complicated by preeclampsia are those that are related to prematurity. With the presence of severely elevated blood pressures since admit, HA and proteinuria, she meets diagnostic criteria for si pre-eclampsia.   Recommendations:  She is s/p magnesium sulfate. If uncontrolled severe range Bps, lab abnormalities, or neuro symptoms become apparent, restart magnesium for seizure prophylaxis. Due to her gestational age and added benefit of neuroprotection, recommend 6g load then 2g/hr. Post delivery, recommend 24hrs of Mag.  Steroids complete 6/16-17  Proceed with induction of labor at this time.  See above.  Titrate nifedipine and labetalol as needed to maintain mild range blood pressures are normotensive.  Postpartum goal is less than 140/90.  Currently on nifedipine 60 mg XL b.i.d. and labetalol 300 mg TID (last adjusted 6/19).      Thank you for allowing us to participate in the care of your patient. If you have any questions/concerns, please do not hesitate to contact us.     Margarette Valdez MD  Maternal Fetal Medicine

## 2025-06-19 NOTE — PROGRESS NOTES
Postpartum Progress Note    Savanah Pang is a 35 y.o.  s/p  at 34w0d currently Post-Partum day 1.  Induction of labor at 34 weeks per [facility] for Cat 2 with multiple decels on FHT and hx of Anti-D antibody and IGUR due to persistent non reassuring fetal heart testing on     Nurse reports no acute events overnight. Patient reports mild abd pain. Patient denies any issues or complaints. Currently on 24 hour post delivery IV Mag, patient with martinez catheter with adequate urine. Passing flatus. Lochia is moderate and stable. No subjective fever or chills. Pain well controlled with current regimen. Denies HA, vision changes, dizziness, N/V, CP, SOB, breast pain or lower extremity pain.  Plans to bottle feed. Desires Injection (DepoProvera) for contraception and would like IUD in future. Baby in NICU.      Physical Exam:   Vitals:    25 0828 25 0829 25 0904 25 0905   BP: 111/74  115/66    Pulse: 72 72 80 82   Resp:       Temp:       TempSrc:       SpO2:  98% 100%    Weight:       Height:           Gen: AAO x 3, NAD, resting in bed comfortably   CV: RRR, S1, S2, no murmurs  Resp: Non labored, lungs CTA bilaterally, good air movement  Abd: fundus firm palpable at the level of the umbilicus, abdomen soft and NT, + BS    Ext: no edema, calves non tender and equal in size, DP/Radial 2+   Psych: cooperative, normal affect    Labs:  Recent Labs   Lab 25  1254 25  0434   HGB 12.1 11.3*   HCT 37.5 35.1*       Assessment/Plan  35 y.o. female   Status Post Vaginal delivery Postpartum#1.       Problem List[1]    Continue routine post-partum/operative care, continue to monitor  Patient does plan to Bottle feed  Continue PNV and Iron.  H/H stable and appropriate  Hx of chronic HTN, continue antihypertensive regiment per [facility], continue to monitor BP, significantly improved s/p delivery  Rh negative; Rubella immune; Varicella immune  Up ad margie; Pelvic Rest for 6 weeks.  DVT PPx -  Ambulation   Contraception: Injection (DepoProvera), would like IUD at postpartum period  Dispo: Likely stable for discharge home on Postpartum day #2  MFM following, appreciate recommendations    Patient and Plan discussed with MD Amie Salvador MD  [facility], [facility]           [1]   Patient Active Problem List  Diagnosis    Primary hypertension    Eye twitch    Sciatic nerve pain, left    Sciatic nerve pain, right    Iron deficiency anemia due to chronic blood loss    Low vitamin D level    - Multigravida of advanced maternal age in third trimester    - Congenital hypoplasia of nasal bone    - Chronic hypertension affecting pregnancy    - History of maternal syphilis, currently pregnant    - Short cervical length during pregnancy in third trimester    - 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

## 2025-06-20 VITALS
TEMPERATURE: 99 F | DIASTOLIC BLOOD PRESSURE: 90 MMHG | OXYGEN SATURATION: 98 % | HEIGHT: 66 IN | HEART RATE: 79 BPM | WEIGHT: 197 LBS | BODY MASS INDEX: 31.66 KG/M2 | RESPIRATION RATE: 18 BRPM | SYSTOLIC BLOOD PRESSURE: 130 MMHG

## 2025-06-20 LAB — PSYCHE PATHOLOGY RESULT: NORMAL

## 2025-06-20 PROCEDURE — 25000003 PHARM REV CODE 250: Performed by: NURSE PRACTITIONER

## 2025-06-20 PROCEDURE — 99232 SBSQ HOSP IP/OBS MODERATE 35: CPT | Mod: ,,, | Performed by: OBSTETRICS & GYNECOLOGY

## 2025-06-20 PROCEDURE — 25000003 PHARM REV CODE 250: Performed by: OBSTETRICS & GYNECOLOGY

## 2025-06-20 PROCEDURE — 25000003 PHARM REV CODE 250

## 2025-06-20 RX ORDER — IBUPROFEN 600 MG/1
600 TABLET, FILM COATED ORAL EVERY 6 HOURS PRN
Qty: 30 TABLET | Refills: 0 | Status: SHIPPED | OUTPATIENT
Start: 2025-06-20

## 2025-06-20 RX ORDER — DOCUSATE SODIUM 100 MG/1
200 CAPSULE, LIQUID FILLED ORAL 2 TIMES DAILY PRN
Qty: 30 CAPSULE | Refills: 0 | Status: SHIPPED | OUTPATIENT
Start: 2025-06-20

## 2025-06-20 RX ORDER — OXYCODONE AND ACETAMINOPHEN 5; 325 MG/1; MG/1
1 TABLET ORAL EVERY 4 HOURS PRN
Qty: 10 TABLET | Refills: 0 | Status: SHIPPED | OUTPATIENT
Start: 2025-06-20

## 2025-06-20 RX ORDER — LABETALOL 300 MG/1
300 TABLET, FILM COATED ORAL EVERY 8 HOURS
Qty: 90 TABLET | Refills: 11 | Status: SHIPPED | OUTPATIENT
Start: 2025-06-20 | End: 2026-06-20

## 2025-06-20 RX ORDER — MEDROXYPROGESTERONE ACETATE 150 MG/ML
150 INJECTION, SUSPENSION INTRAMUSCULAR ONCE
Status: COMPLETED | OUTPATIENT
Start: 2025-06-20 | End: 2025-06-20

## 2025-06-20 RX ADMIN — ACETAMINOPHEN 650 MG: 325 TABLET ORAL at 05:06

## 2025-06-20 RX ADMIN — NIFEDIPINE 60 MG: 60 TABLET, FILM COATED, EXTENDED RELEASE ORAL at 07:06

## 2025-06-20 RX ADMIN — PRENATAL VITAMINS-IRON FUMARATE 27 MG IRON-FOLIC ACID 0.8 MG TABLET 1 TABLET: at 07:06

## 2025-06-20 RX ADMIN — IBUPROFEN 600 MG: 600 TABLET, FILM COATED ORAL at 05:06

## 2025-06-20 RX ADMIN — MEDROXYPROGESTERONE ACETATE 150 MG: 150 INJECTION, SUSPENSION INTRAMUSCULAR at 10:06

## 2025-06-20 RX ADMIN — LABETALOL HYDROCHLORIDE 300 MG: 200 TABLET, FILM COATED ORAL at 04:06

## 2025-06-20 NOTE — NURSING
Written discharge instructions were given and reviewed from the postpartum and  care [facility] booklet as well as prescribed meds, follow up appointments,precautions to take at home, and incision care if applicable. Also reviewed written and verbal instructions related to life threatening postpartum concerns/reasons to call MD and/or when to come back to the ER. Pt verbalized understanding.   Reviewed Pre-e signs and symptoms with patient. Instructed patient to schedule a BP check appointment with her OB. Also instructed pt to check her BP twice daily and to notify MD for BP of 150/90 or more and to come to for bp of 160/110 or more. patient verbalizes understanding.

## 2025-06-20 NOTE — PROGRESS NOTES
Progress Note  Maternal Fetal Medicine          Subjective:      IUP at 34w0d - history of cHTN, IUGR/ elevated S/D ratio, antiD isoimmunization who is admitted for abnormal  testing and hypertension.     S/p  PPD#2    Doing well per nursing.  The patient is currently in the NICU visiting her baby.  Blood pressures are much improved on labetalol 300 mg every 8 hours.  She remains on nifedipine 60 mg b.i.d..      Objective:         Temp:  [97.6 °F (36.4 °C)-98.8 °F (37.1 °C)] 98.8 °F (37.1 °C)  Pulse:  [71-98] 79  Resp:  [18] 18  SpO2:  [96 %-100 %] 98 %  BP: (109-133)/(74-90) 130/90    Gen: NAD, A&Ox3  Pulm: Unlabored breathing, LCTAB  Card: RRR  Abd: fundus firm  Extremities: Palpable peripheral pulses, trace pedal edema, 2+ DTRs x 4    Lab Review  Blood Type: O NEG    No results found for this or any previous visit (from the past 24 hours).      Assessment:       35 y.o.  at 34w0d weeks gestation admitted for abnormal fetal monitoring in the setting of IUGR, elevated S/D ratio, known anti-D isoimmunization and chronic HTN s/p  PPD#2    Active Hospital Problems    Diagnosis  POA    * (spontaneous vaginal delivery) [O80]  Not Applicable    NST (non-stress test) nonreactive [O28.8]  Yes     Reports decreased fetal movement on morning of admission, reports increased fetal movements since admission  Reports Non-reactive NST at [facility] office, admitted for Observation with continuous fetal monitoring, FHT on admission with Cat 1 moderate variability  Ordered Betamethasone 12 mg x2 in case of imminent delivery  [facility] consulted, appreciate recommendations      - Fetal growth restriction antepartum [O36.5990]  Yes    - Chronic hypertension affecting pregnancy [O10.919]  Yes     Hypertensive on admission with /94, patient reports adherrence to home antihypertensives  Improvement of BP to 140s/100s, continue home BP regiment of Labetalol 200mg TID and Procardia 60mg BID  Denies severe features  including headache, vision changes, decreased urination, SOB, or extremity edema         Resolved Hospital Problems   No resolved problems to display.        Plan:     1. Superimposed preeclampsia  - the patient has a history of chronic hypertension on multiple medications.  Continue home medications at this time.  I counseled the patient regarding the suspected diagnosis. Other causes of elevated blood pressure and proteinuria include chronic renal disease, autoimmune disorders, chronic hypertension, and diabetes. Additionally, certain urinary tract pathogens can cause proteinuria, however, are unlikely to be associated with hypertension unless prolonged kidney disease is present. I reviewed the diagnosis of preeclampsia, the potential course of this disease, maternal-fetal complications, the lack of any available therapy to prevent progression, the likelihood of resolution post-delivery, and the potential for recurrence in a subsequent pregnancy. I counseled the patient that one of the major fetal considerations in a pregnancy complicated by preeclampsia are those that are related to prematurity. With the presence of severely elevated blood pressures since admit, HA and proteinuria, she meets diagnostic criteria for si pre-eclampsia.   Recommendations:  She is s/p magnesium sulfate (PP)  Titrate nifedipine and labetalol as needed to maintain mild range blood pressures are normotensive.  Postpartum goal is less than 140/90.  Currently on nifedipine 60 mg XL b.i.d. and labetalol 300 mg TID (last adjusted 6/19).    Appropriate for discharge home from [facility] standpoint.  M will sign off at this time.      Thank you for allowing us to participate in the care of your patient. If you have any questions/concerns, please do not hesitate to contact us.     Margarette Valdez MD  Maternal Fetal Medicine

## 2025-06-20 NOTE — PLAN OF CARE
Problem: Adult Inpatient Plan of Care  Goal: Plan of Care Review  Outcome: Progressing  Goal: Patient-Specific Goal (Individualized)  Outcome: Progressing  Flowsheets (Taken 2025)  Patient/Family-Specific Goals (Include Timeframe): I want to visit my baby in the NICU  Goal: Absence of Hospital-Acquired Illness or Injury  Outcome: Progressing  Goal: Optimal Comfort and Wellbeing  Outcome: Progressing  Goal: Readiness for Transition of Care  Outcome: Progressing     Problem:  Fall Injury Risk  Goal: Absence of Fall, Infant Drop and Related Injury  Outcome: Progressing     Problem: Infection  Goal: Absence of Infection Signs and Symptoms  Outcome: Progressing     Problem: Postpartum (Vaginal Delivery)  Goal: Successful Parent Role Transition  Outcome: Progressing  Goal: Hemostasis  Outcome: Progressing  Goal: Absence of Infection Signs and Symptoms  Outcome: Progressing  Goal: Anesthesia/Sedation Recovery  Outcome: Progressing  Goal: Optimal Pain Control and Function  Outcome: Progressing  Goal: Effective Urinary Elimination  Outcome: Progressing

## 2025-07-02 NOTE — ANESTHESIA POSTPROCEDURE EVALUATION
Anesthesia Post Evaluation    Patient: Savanah Pang    Procedure(s) Performed: * No procedures listed *    Final Anesthesia Type: epidural      Patient location during evaluation: med/surg floor  Patient participation: Yes- Able to Participate  Level of consciousness: awake and alert  Post-procedure vital signs: reviewed and stable  Pain management: adequate  Airway patency: patent      Anesthetic complications: no      Cardiovascular status: hemodynamically stable  Respiratory status: unassisted  Hydration status: euvolemic  Follow-up not needed.                No case tracking events are documented in the log.      Pain/Tamara Score: No data recorded

## 2025-08-07 ENCOUNTER — POSTPARTUM VISIT (OUTPATIENT)
Dept: FAMILY MEDICINE | Facility: CLINIC | Age: 35
End: 2025-08-07
Payer: MEDICAID

## 2025-08-07 VITALS
SYSTOLIC BLOOD PRESSURE: 106 MMHG | WEIGHT: 181.63 LBS | OXYGEN SATURATION: 98 % | HEIGHT: 66 IN | HEART RATE: 80 BPM | TEMPERATURE: 98 F | BODY MASS INDEX: 29.19 KG/M2 | DIASTOLIC BLOOD PRESSURE: 75 MMHG

## 2025-08-07 PROCEDURE — 99213 OFFICE O/P EST LOW 20 MIN: CPT | Mod: PBBFAC

## 2025-08-07 RX ORDER — NIFEDIPINE 60 MG/1
60 TABLET, EXTENDED RELEASE ORAL 2 TIMES DAILY
Qty: 60 TABLET | Refills: 6 | Status: SHIPPED | OUTPATIENT
Start: 2025-08-07

## 2025-08-07 RX ORDER — LABETALOL 200 MG/1
200 TABLET, FILM COATED ORAL EVERY 8 HOURS
Qty: 90 TABLET | Refills: 6 | Status: SHIPPED | OUTPATIENT
Start: 2025-08-07

## 2025-08-07 NOTE — PROGRESS NOTES
"Postpartum OB Clinic    Chief Complaint  Chief Complaint   Patient presents with    Postpartum Care          History of Present Illness  Savanah Pang is a 35 y.o.  S/P  Post-Partum here at clinic for 6wk f/u visit.    Pregnancy complicated by cHTN on labetalol 200 BID and Procardia 60 BID, superimposed preeclampsia,  delivery, AMA, Depression/anxiety, H/o syphilis treated in 2017, Rh negative status, Anti-D antibody positive. Patient is doing well today; has support at home and is currently Bottle feeding.   She denies depressed mood or suicidal/homicidal ideations. States infant is Home, doing well. Denies HA, light-headedness/dizziness, visual changes, CP, le edema, SOB, abdominal pain, n/v, dec appetite. Denies resumption of sexual activity. Denies any other social needs.    Information for the patient's :  Ila Mejia [[id number]]     Delivery:    Episiotomy: None   Lacerations: None   Repair suture: None   Repair # of packets:     Blood loss (ml):       Birth information:  YOB: 2027   Time of birth: 11:39 AM   Sex: female   Delivery type: Vaginal, Spontaneous   Gestational Age: 34w0d     Measurements    Weight: 1635 g  Weight (lbs): 3 lb 9.7 oz  Length: 42 cm  Length (in): 16.54"  Head circumference: 28 cm  Abdominal girth: 20.5 cm         Delivery Clinician: Delivery Providers    Delivering clinician: Henri Lancaster MD   Provider Role    Hattie Guzmán RN Registered Nurse    Ifrah Villasenor RN Registered Nurse             Additional  information:  Forceps:    Vacuum:    Breech:    Observed anomalies      Living?:     Apgars    Living status: Living  Apgar Component Scores:  1 min.:  5 min.:  10 min.:  15 min.:  20 min.:    Skin color:  0  1       Heart rate:  2  2       Reflex irritability:  2  2       Muscle tone:  2  2       Respiratory effort:  2  2       Total:  8  9       Apgars assigned by: MENG OWENS RN         Placenta: Delivered:       " appearance         ROS:  - see HPI    Physical Exam:  Temp:  [98.2 °F (36.8 °C)]   Pulse:  [80]   BP: (106)/(75)   SpO2:  [98 %]   General:  VSS, afebrile. No acute distress.   Respiratory: Non labored.  No coughing.  Cardiovascular:  No peripheral edema.  DP pulses palpable bilaterally.   ABD: BS+, Soft, nontender  Musculoskeletal:  Full range of motion of all extremities; no joint deformities or edema.   Neurologic:  A&O X 3.    Psychiatric:  Calm, cooperative.  Mood and effect normal.  Responses appropriate.     [place]  Depression Scale:   [place]  Depression Scale:  In the Past 7 Days  I have been able to laugh and see the funny side of things.: As much as I always could  I have looked forward with enjoyment to things.: As much as I ever did  I have blamed myself unnecessarily when things went wrong.: No, never  I have been anxious or worried for no good reason.: No, not at all  I have felt scared or panicky for no good reason.: No, not at all  Things have been getting on top of me.: No, I have been coping as well as ever  I have been so unhappy that I have had difficulty sleeping.: Not at all  I have felt sad or miserable.: No, not at all  I have been so unhappy that I have been crying.: No, never  The thought of harming myself has occurred to me.: Never  [place]  Depression Scale Total: 0      Medication List with Changes/Refills   New Medications    LABETALOL (NORMODYNE) 200 MG TABLET    Take 1 tablet (200 mg total) by mouth every 8 (eight) hours.   Current Medications    ASPIRIN (ECOTRIN) 81 MG EC TABLET    Take 1 tablet (81 mg total) by mouth once daily.    DOCUSATE SODIUM (COLACE) 100 MG CAPSULE    Take 2 capsules (200 mg total) by mouth 2 (two) times daily as needed for Constipation.    IBUPROFEN (ADVIL,MOTRIN) 600 MG TABLET    Take 1 tablet (600 mg total) by mouth every 6 (six) hours as needed for Pain.    LANSOPRAZOLE (PREVACID) 15 MG CAPSULE    Take 1 capsule (15 mg  total) by mouth once daily.    OXYCODONE-ACETAMINOPHEN (PERCOCET) 5-325 MG PER TABLET    Take 1 tablet by mouth every 4 (four) hours as needed for Pain.    PNV,CALCIUM 72-IRON-FOLIC ACID (PRENATAL VITAMIN PLUS LOW IRON) 27 MG IRON- 1 MG TAB    Take 1 tablet (1 each total) by mouth once daily.   Changed and/or Refilled Medications    Modified Medication Previous Medication    NIFEDIPINE (PROCARDIA-XL) 60 MG (OSM) 24 HR TABLET NIFEdipine (PROCARDIA-XL) 60 MG (OSM) 24 hr tablet       Take 1 tablet (60 mg total) by mouth 2 (two) times a day.    Take 1 tablet (60 mg total) by mouth 2 (two) times a day.   Discontinued Medications    LABETALOL (NORMODYNE) 300 MG TABLET    Take 1 tablet (300 mg total) by mouth every 8 (eight) hours.         Assessment / Plan:     Status post  routine postpartum follow-up  - instructed patient on proper wound care, to keep the incision area dry and clean   - Educated pt on signs and sx of post partum blues, post partum depression and post partum psychosis.   - Plans to bottle feed  - Contraception: Injection (DepoProvera)   - Patient is not a [facility] patient and is to follow up with PCP    Hypertension  - controlled   - continue labetalol 200 mg BID and procardia 60 mg BID       Follow up:     No follow up. Patient to follow up with new PCP. Referral to CHANEL Stewart MD   [facility], -II  2025